# Patient Record
Sex: FEMALE | Race: WHITE | NOT HISPANIC OR LATINO | Employment: FULL TIME | URBAN - METROPOLITAN AREA
[De-identification: names, ages, dates, MRNs, and addresses within clinical notes are randomized per-mention and may not be internally consistent; named-entity substitution may affect disease eponyms.]

---

## 2017-06-20 ENCOUNTER — TELEPHONE (OUTPATIENT)
Dept: FAMILY MEDICINE | Facility: CLINIC | Age: 37
End: 2017-06-20

## 2017-06-20 DIAGNOSIS — F41.9 ANXIETY: Chronic | ICD-10-CM

## 2017-06-20 RX ORDER — FLUOXETINE HYDROCHLORIDE 40 MG/1
40 CAPSULE ORAL DAILY
Qty: 90 CAPSULE | Refills: 3 | Status: SHIPPED | OUTPATIENT
Start: 2017-06-20 | End: 2017-06-20 | Stop reason: SDUPTHER

## 2017-06-20 RX ORDER — FLUOXETINE HYDROCHLORIDE 40 MG/1
40 CAPSULE ORAL DAILY
Qty: 90 CAPSULE | Refills: 3 | Status: SHIPPED | OUTPATIENT
Start: 2017-06-20 | End: 2017-08-16 | Stop reason: SDUPTHER

## 2017-07-19 ENCOUNTER — TELEPHONE (OUTPATIENT)
Dept: GASTROENTEROLOGY | Facility: CLINIC | Age: 37
End: 2017-07-19

## 2017-07-19 ENCOUNTER — OFFICE VISIT (OUTPATIENT)
Dept: GASTROENTEROLOGY | Facility: CLINIC | Age: 37
End: 2017-07-19
Payer: COMMERCIAL

## 2017-07-19 VITALS
DIASTOLIC BLOOD PRESSURE: 92 MMHG | WEIGHT: 223 LBS | SYSTOLIC BLOOD PRESSURE: 139 MMHG | HEIGHT: 70 IN | HEART RATE: 75 BPM | BODY MASS INDEX: 31.92 KG/M2 | RESPIRATION RATE: 18 BRPM

## 2017-07-19 DIAGNOSIS — R12 HEARTBURN: ICD-10-CM

## 2017-07-19 DIAGNOSIS — R10.11 ABDOMINAL PAIN, RUQ (RIGHT UPPER QUADRANT): Primary | ICD-10-CM

## 2017-07-19 DIAGNOSIS — R10.13 EPIGASTRIC PAIN: Primary | ICD-10-CM

## 2017-07-19 DIAGNOSIS — R10.13 ABDOMINAL PAIN, EPIGASTRIC: ICD-10-CM

## 2017-07-19 DIAGNOSIS — R10.13 DYSPEPSIA: ICD-10-CM

## 2017-07-19 PROCEDURE — 99214 OFFICE O/P EST MOD 30 MIN: CPT | Mod: S$GLB,,, | Performed by: NURSE PRACTITIONER

## 2017-07-19 RX ORDER — PANTOPRAZOLE SODIUM 40 MG/1
40 TABLET, DELAYED RELEASE ORAL DAILY
Qty: 30 TABLET | Refills: 3 | Status: SHIPPED | OUTPATIENT
Start: 2017-07-19 | End: 2017-08-08 | Stop reason: SDUPTHER

## 2017-07-19 RX ORDER — MULTIVITAMIN
1 TABLET ORAL DAILY
COMMUNITY
End: 2018-04-03

## 2017-07-19 NOTE — PROGRESS NOTES
Subjective:       Patient ID: Alyssa Robledo is a 36 y.o. female.    Chief Complaint: Abdominal Pain (RUQ X 4 years)    HPI  Reports RUQ pain that has been present for four years.  US 4 years ago was unremarkable ok.  Her pain comes and goes and radiates around to the back.  Described as both a burning and a cramping pain.  Worse after eating.  Fried foods and alcohol are typically worse.  No nausea.    She reports recently worsening indigestion with more frequent episodes of heartburn.  She has been on pepcid for these complaints for some time.  Denies dysphagia.    Reports regular bowel habit.  Denies blood with bowel movements or black stools.  She has never had HIDA scan or EGD.    Review of Systems   Constitutional: Negative.  Negative for activity change, appetite change, fatigue, fever and unexpected weight change.   HENT: Negative.  Negative for sore throat and trouble swallowing.    Respiratory: Negative.  Negative for cough, choking and shortness of breath.    Cardiovascular: Negative.  Negative for chest pain.   Gastrointestinal: Positive for abdominal pain. Negative for blood in stool, constipation, diarrhea, nausea and vomiting.   Genitourinary: Negative.  Negative for difficulty urinating, dysuria and hematuria.   Musculoskeletal: Positive for back pain. Negative for neck pain and neck stiffness.   Skin: Negative.    Neurological: Negative.  Negative for dizziness, syncope, weakness and light-headedness.   Psychiatric/Behavioral: Negative.        Objective:      Physical Exam   Constitutional: She is oriented to person, place, and time. She appears well-developed and well-nourished. No distress.   HENT:   Head: Normocephalic.   Eyes: No scleral icterus.   Neck: Neck supple.   Cardiovascular: Normal rate.    Pulmonary/Chest: Effort normal. No respiratory distress.   Abdominal: Soft. Bowel sounds are normal. She exhibits no distension and no mass. There is tenderness in the right upper quadrant and  epigastric area. There is no guarding.   Musculoskeletal: Normal range of motion.   Neurological: She is oriented to person, place, and time.   Skin: Skin is warm and dry. She is not diaphoretic.   Psychiatric: She has a normal mood and affect. Her behavior is normal. Judgment and thought content normal.   Vitals reviewed.      Assessment:       1. Abdominal pain, RUQ (right upper quadrant)    2. Abdominal pain, epigastric    3. Dyspepsia    4. Heartburn        Plan:         Alyssa was seen today for abdominal pain.    Diagnoses and all orders for this visit:    Abdominal pain, RUQ (right upper quadrant)  -     CBC auto differential; Future  -     Comprehensive metabolic panel; Future  -     Lipase; Future  -     US Abdomen Complete; Future    Abdominal pain, epigastric    Dyspepsia    Heartburn    Other orders  -     pantoprazole (PROTONIX) 40 MG tablet; Take 1 tablet (40 mg total) by mouth once daily.    I have explained the planned procedures to the patient.The risks, benefits and alternatives of the procedure were also explained in detail. Patient verbalized understanding, all questions were answered. The patient agrees to proceed as planned.    Change pepcid to pantoprazole.     Labs, US and EGD with Dr. Watson per patient request.    May need to consider HIDA scan.

## 2017-07-19 NOTE — TELEPHONE ENCOUNTER
This is a patient that I saw in [place].  Her  is a patient of Dr. Watson's and she would like to also schedule with Dr. Watson in [place] for EGD.    Please call her to schedule EGD for epigastric pain, dyspepsia

## 2017-07-19 NOTE — TELEPHONE ENCOUNTER
Patient is scheduled for EGD on July 28, 2017 with Dr. Watson in [place], prep information was explained and mailed out to the patient.

## 2017-07-19 NOTE — TELEPHONE ENCOUNTER
Spoke with pt and was able to give her lab results. Pt stated that she needed help with her [employer] account. I was able to call the help desk and have that rep help this pt.       ----- Message from KURT Barnett sent at 7/19/2017  1:28 PM CDT -----  Let her know that labs are normal

## 2017-07-20 ENCOUNTER — PATIENT MESSAGE (OUTPATIENT)
Dept: GASTROENTEROLOGY | Facility: CLINIC | Age: 37
End: 2017-07-20

## 2017-07-20 ENCOUNTER — TELEPHONE (OUTPATIENT)
Dept: GASTROENTEROLOGY | Facility: CLINIC | Age: 37
End: 2017-07-20

## 2017-07-20 NOTE — TELEPHONE ENCOUNTER
"----- Message from Jermaine Ray sent at 7/19/2017  3:30 PM CDT -----  Contact: self  [phone]  "Please call me in reference to lab results"  "

## 2017-07-28 ENCOUNTER — ANESTHESIA (OUTPATIENT)
Dept: ENDOSCOPY | Facility: HOSPITAL | Age: 37
End: 2017-07-28
Payer: COMMERCIAL

## 2017-07-28 ENCOUNTER — HOSPITAL ENCOUNTER (OUTPATIENT)
Facility: HOSPITAL | Age: 37
Discharge: HOME | End: 2017-07-28
Attending: INTERNAL MEDICINE | Admitting: INTERNAL MEDICINE
Payer: COMMERCIAL

## 2017-07-28 ENCOUNTER — ANESTHESIA EVENT (OUTPATIENT)
Dept: ENDOSCOPY | Facility: HOSPITAL | Age: 37
End: 2017-07-28
Payer: COMMERCIAL

## 2017-07-28 VITALS
HEART RATE: 70 BPM | DIASTOLIC BLOOD PRESSURE: 72 MMHG | HEIGHT: 69 IN | TEMPERATURE: 98 F | RESPIRATION RATE: 18 BRPM | BODY MASS INDEX: 32.58 KG/M2 | WEIGHT: 220 LBS | OXYGEN SATURATION: 99 % | SYSTOLIC BLOOD PRESSURE: 128 MMHG

## 2017-07-28 DIAGNOSIS — Z12.11 SCREENING FOR COLORECTAL CANCER: ICD-10-CM

## 2017-07-28 DIAGNOSIS — Z12.12 SCREENING FOR COLORECTAL CANCER: ICD-10-CM

## 2017-07-28 DIAGNOSIS — R10.11 RUQ PAIN: Primary | ICD-10-CM

## 2017-07-28 DIAGNOSIS — R12 HEARTBURN: ICD-10-CM

## 2017-07-28 LAB
B-HCG UR QL: NEGATIVE
CTP QC/QA: YES

## 2017-07-28 PROCEDURE — 37000008 HC ANESTHESIA 1ST 15 MINUTES: Performed by: INTERNAL MEDICINE

## 2017-07-28 PROCEDURE — 63600175 PHARM REV CODE 636 W HCPCS: Performed by: NURSE ANESTHETIST, CERTIFIED REGISTERED

## 2017-07-28 PROCEDURE — 81025 URINE PREGNANCY TEST: CPT | Performed by: INTERNAL MEDICINE

## 2017-07-28 PROCEDURE — 88305 TISSUE EXAM BY PATHOLOGIST: CPT | Mod: 26,,, | Performed by: PATHOLOGY

## 2017-07-28 PROCEDURE — 88305 TISSUE EXAM BY PATHOLOGIST: CPT | Performed by: PATHOLOGY

## 2017-07-28 PROCEDURE — 25000003 PHARM REV CODE 250: Performed by: INTERNAL MEDICINE

## 2017-07-28 PROCEDURE — 25000003 PHARM REV CODE 250: Performed by: ANESTHESIOLOGY

## 2017-07-28 PROCEDURE — 37000009 HC ANESTHESIA EA ADD 15 MINS: Performed by: INTERNAL MEDICINE

## 2017-07-28 PROCEDURE — 27201012 HC FORCEPS, HOT/COLD, DISP: Performed by: INTERNAL MEDICINE

## 2017-07-28 PROCEDURE — 43239 EGD BIOPSY SINGLE/MULTIPLE: CPT | Mod: ,,, | Performed by: INTERNAL MEDICINE

## 2017-07-28 PROCEDURE — 43239 EGD BIOPSY SINGLE/MULTIPLE: CPT | Performed by: INTERNAL MEDICINE

## 2017-07-28 RX ORDER — MIDAZOLAM HYDROCHLORIDE 1 MG/ML
INJECTION, SOLUTION INTRAMUSCULAR; INTRAVENOUS
Status: DISCONTINUED | OUTPATIENT
Start: 2017-07-28 | End: 2017-07-28

## 2017-07-28 RX ORDER — LIDOCAINE HCL/PF 100 MG/5ML
SYRINGE (ML) INTRAVENOUS
Status: DISCONTINUED | OUTPATIENT
Start: 2017-07-28 | End: 2017-07-28

## 2017-07-28 RX ORDER — PROPOFOL 10 MG/ML
VIAL (ML) INTRAVENOUS
Status: DISCONTINUED | OUTPATIENT
Start: 2017-07-28 | End: 2017-07-28

## 2017-07-28 RX ORDER — NAPROXEN SODIUM 220 MG
220 TABLET ORAL
COMMUNITY
End: 2017-08-16

## 2017-07-28 RX ORDER — SCOLOPAMINE TRANSDERMAL SYSTEM 1 MG/1
1 PATCH, EXTENDED RELEASE TRANSDERMAL ONCE
Status: COMPLETED | OUTPATIENT
Start: 2017-07-28 | End: 2017-07-28

## 2017-07-28 RX ORDER — SODIUM CHLORIDE 9 MG/ML
INJECTION, SOLUTION INTRAVENOUS CONTINUOUS
Status: DISCONTINUED | OUTPATIENT
Start: 2017-07-28 | End: 2017-07-28 | Stop reason: HOSPADM

## 2017-07-28 RX ORDER — FENTANYL CITRATE 50 UG/ML
INJECTION, SOLUTION INTRAMUSCULAR; INTRAVENOUS
Status: DISCONTINUED | OUTPATIENT
Start: 2017-07-28 | End: 2017-07-28

## 2017-07-28 RX ADMIN — SODIUM CHLORIDE: 0.9 INJECTION, SOLUTION INTRAVENOUS at 11:07

## 2017-07-28 RX ADMIN — SCOPALAMINE 1.5 MG: 1 PATCH, EXTENDED RELEASE TRANSDERMAL at 11:07

## 2017-07-28 RX ADMIN — MIDAZOLAM 2 MG: 1 INJECTION INTRAMUSCULAR; INTRAVENOUS at 12:07

## 2017-07-28 RX ADMIN — FENTANYL CITRATE 100 MCG: 50 INJECTION, SOLUTION INTRAMUSCULAR; INTRAVENOUS at 12:07

## 2017-07-28 RX ADMIN — PROPOFOL 50 MG: 10 INJECTION, EMULSION INTRAVENOUS at 12:07

## 2017-07-28 RX ADMIN — PROPOFOL 20 MG: 10 INJECTION, EMULSION INTRAVENOUS at 12:07

## 2017-07-28 RX ADMIN — LIDOCAINE HYDROCHLORIDE 75 MG: 20 INJECTION, SOLUTION INTRAVENOUS at 12:07

## 2017-07-28 NOTE — DISCHARGE INSTRUCTIONS
Post EGD Discharge Instruction    Alyssa Robledo  7/28/2017  Jimbo Watson Jr., *    RESTRICTIONS ON ACTIVITY:    -DO NOT drive a car, operate machinery or make critical decisions, or do activities that require coordination or balance for 24 hours.  -Following Day: Return to full activities including work.  -Diet: Eat and drink normally unless instructed otherwise.    TREATMENT FOR COMMON SIDE EFFECTS:  *Sore Throat - treat with throat lozenges, gargle with warm salt water.  *Mild abdominal pain & bloating- rest and take liquids only.    SYMPTOMS TO WATCH FOR AND REPORT TO YOUR PHYSICIAN:  1. Chills or fever occurring 24 hours after procedure.  2. Pain in chest.  3. SEVERE abdominal pain or bloating.  4. Rectal bleeding which could be maroon or black.    If you have any questions or problems, please call your Physician:    Jimbo Watson Jr., * Phone:     Lab Results: [phone]    If a complication or emergency situation arises and you are unable to reach your Physician - GO TO THE EMERGENCY ROOM.

## 2017-07-28 NOTE — ANESTHESIA POSTPROCEDURE EVALUATION
"Anesthesia Post Evaluation    Patient: Alyssa Robledo    Procedure(s) Performed: Procedure(s) (LRB):  ESOPHAGOGASTRODUODENOSCOPY (EGD) (N/A)    Final Anesthesia Type: MAC  Patient location during evaluation: GI PACU  Patient participation: Yes- Able to Participate  Level of consciousness: awake and alert  Post-procedure vital signs: reviewed and stable  Pain management: adequate  Airway patency: patent  PONV status at discharge: No PONV  Anesthetic complications: no      Cardiovascular status: blood pressure returned to baseline and hemodynamically stable  Respiratory status: unassisted, spontaneous ventilation and room air  Hydration status: euvolemic  Follow-up not needed.        Visit Vitals  BP (!) 144/85   Pulse 77   Temp 37.1 °C (98.7 °F) (Oral)   Resp (!) 8   Ht 5' 9" (1.753 m)   Wt 99.8 kg (220 lb)   LMP 07/20/2017   SpO2 97%   Breastfeeding? No   BMI 32.49 kg/m²       Pain/Jacoby Score: Pain Assessment Performed: Yes (7/28/2017 11:00 AM)  Presence of Pain: denies (7/28/2017 11:00 AM)      "

## 2017-07-28 NOTE — TRANSFER OF CARE
"Anesthesia Transfer of Care Note    Patient: Alyssa Robledo    Procedure(s) Performed: Procedure(s) (LRB):  ESOPHAGOGASTRODUODENOSCOPY (EGD) (N/A)    Patient location: GI    Anesthesia Type: MAC    Transport from OR: Transported from OR on room air with adequate spontaneous ventilation    Post pain: adequate analgesia    Post assessment: no apparent anesthetic complications    Post vital signs: stable    Level of consciousness: awake    Nausea/Vomiting: no nausea/vomiting    Complications: none    Transfer of care protocol was followed      Last vitals:   Visit Vitals  BP (!) 144/85   Pulse 77   Temp 37.1 °C (98.7 °F) (Oral)   Resp (!) 8   Ht 5' 9" (1.753 m)   Wt 99.8 kg (220 lb)   LMP 07/20/2017   SpO2 97%   Breastfeeding? No   BMI 32.49 kg/m²     "

## 2017-07-28 NOTE — ANESTHESIA PREPROCEDURE EVALUATION
07/28/2017  Alyssa Robledo is a 36 y.o., female for EGD under MAC    Anesthesia Evaluation     I have reviewed the Nursing Notes.   I have reviewed the Medications.     Review of Systems  Anesthesia Hx:  Personal Hx of Anesthesia complications, Post-Operative Nausea/Vomiting (request scop patch), in the past, but not with recent anesthetics / prophylaxis   Social:  Social Alcohol Use, Non-Smoker   Cardiovascular:  Cardiovascular Normal Exercise tolerance: good     Hepatic/GI:   GERD        Physical Exam  General:  Obesity       Chest/Lungs:  Chest/Lungs Clear    Heart/Vascular:  Heart Findings: Normal            Anesthesia Plan  Type of Anesthesia, risks & benefits discussed:  Anesthesia Type:  MAC  Patient's Preference:   Intra-op Monitoring Plan:   Intra-op Monitoring Plan Comments:   Post Op Pain Control Plan:   Post Op Pain Control Plan Comments:   Induction:    Beta Blocker:  Patient is not currently on a Beta-Blocker (No further documentation required).       Informed Consent: Patient understands risks and agrees with Anesthesia plan.  Questions answered. Anesthesia consent signed with patient.  ASA Score: 2     Day of Surgery Review of History & Physical:        Anesthesia Plan Notes: Scop patch         Ready For Surgery From Anesthesia Perspective.

## 2017-08-02 ENCOUNTER — TELEPHONE (OUTPATIENT)
Dept: GASTROENTEROLOGY | Facility: CLINIC | Age: 37
End: 2017-08-02

## 2017-08-02 DIAGNOSIS — R10.11 ABDOMINAL PAIN, RUQ (RIGHT UPPER QUADRANT): Primary | ICD-10-CM

## 2017-08-02 NOTE — TELEPHONE ENCOUNTER
Informed pt of US results. Pt stated that a HIDA scan needed to be done. Message sent to NP so that she can place the order. Verbal Understanding.      ----- Message from KURT Barnett sent at 8/2/2017  4:20 PM CDT -----  US is ok

## 2017-08-03 NOTE — TELEPHONE ENCOUNTER
Spoke with pt and was able to give her the number to scheduling so she can schedule her HIDA Scan.

## 2017-08-04 ENCOUNTER — TELEPHONE (OUTPATIENT)
Dept: ENDOSCOPY | Facility: HOSPITAL | Age: 37
End: 2017-08-04

## 2017-08-08 ENCOUNTER — PATIENT MESSAGE (OUTPATIENT)
Dept: GASTROENTEROLOGY | Facility: CLINIC | Age: 37
End: 2017-08-08

## 2017-08-08 RX ORDER — PANTOPRAZOLE SODIUM 40 MG/1
40 TABLET, DELAYED RELEASE ORAL DAILY
Qty: 90 TABLET | Refills: 3 | Status: SHIPPED | OUTPATIENT
Start: 2017-08-08 | End: 2018-03-07 | Stop reason: SDUPTHER

## 2017-08-10 ENCOUNTER — TELEPHONE (OUTPATIENT)
Dept: GASTROENTEROLOGY | Facility: CLINIC | Age: 37
End: 2017-08-10

## 2017-08-10 NOTE — TELEPHONE ENCOUNTER
----- Message from Jimbo Watson Jr., MD sent at 8/9/2017 11:51 AM CDT -----  Gastric biopsies negative for H. Pylori  Test indicates reflux  Rec continue PPI.  Follow up as needed

## 2017-08-11 ENCOUNTER — TELEPHONE (OUTPATIENT)
Dept: FAMILY MEDICINE | Facility: CLINIC | Age: 37
End: 2017-08-11

## 2017-08-15 ENCOUNTER — TELEPHONE (OUTPATIENT)
Dept: GASTROENTEROLOGY | Facility: CLINIC | Age: 37
End: 2017-08-15

## 2017-08-16 ENCOUNTER — OFFICE VISIT (OUTPATIENT)
Dept: FAMILY MEDICINE | Facility: CLINIC | Age: 37
End: 2017-08-16
Payer: COMMERCIAL

## 2017-08-16 ENCOUNTER — TELEPHONE (OUTPATIENT)
Dept: GASTROENTEROLOGY | Facility: CLINIC | Age: 37
End: 2017-08-16

## 2017-08-16 VITALS
DIASTOLIC BLOOD PRESSURE: 88 MMHG | SYSTOLIC BLOOD PRESSURE: 148 MMHG | RESPIRATION RATE: 16 BRPM | BODY MASS INDEX: 33.37 KG/M2 | OXYGEN SATURATION: 98 % | HEART RATE: 94 BPM | HEIGHT: 69 IN | WEIGHT: 225.31 LBS

## 2017-08-16 DIAGNOSIS — Z13.220 SCREENING FOR LIPID DISORDERS: ICD-10-CM

## 2017-08-16 DIAGNOSIS — F41.9 ANXIETY: Chronic | ICD-10-CM

## 2017-08-16 DIAGNOSIS — R03.0 ELEVATED BP WITHOUT DIAGNOSIS OF HYPERTENSION: ICD-10-CM

## 2017-08-16 DIAGNOSIS — E66.9 OBESITY (BMI 30.0-34.9): ICD-10-CM

## 2017-08-16 DIAGNOSIS — K21.9 GASTROESOPHAGEAL REFLUX DISEASE, ESOPHAGITIS PRESENCE NOT SPECIFIED: Primary | ICD-10-CM

## 2017-08-16 PROBLEM — E66.811 OBESITY (BMI 30.0-34.9): Status: ACTIVE | Noted: 2017-08-16

## 2017-08-16 PROCEDURE — 99999 PR PBB SHADOW E&M-EST. PATIENT-LVL III: CPT | Mod: PBBFAC,,, | Performed by: FAMILY MEDICINE

## 2017-08-16 PROCEDURE — 3008F BODY MASS INDEX DOCD: CPT | Mod: S$GLB,,, | Performed by: FAMILY MEDICINE

## 2017-08-16 PROCEDURE — 99214 OFFICE O/P EST MOD 30 MIN: CPT | Mod: S$GLB,,, | Performed by: FAMILY MEDICINE

## 2017-08-16 RX ORDER — FLUOXETINE HYDROCHLORIDE 40 MG/1
40 CAPSULE ORAL DAILY
Qty: 90 CAPSULE | Refills: 3 | Status: SHIPPED | OUTPATIENT
Start: 2017-08-16 | End: 2018-04-03 | Stop reason: SDUPTHER

## 2017-08-16 RX ORDER — DICYCLOMINE HYDROCHLORIDE 20 MG/1
20 TABLET ORAL
Qty: 120 TABLET | Refills: 5 | Status: SHIPPED | OUTPATIENT
Start: 2017-08-16 | End: 2017-09-15

## 2017-08-16 NOTE — PROGRESS NOTES
Subjective:       Patient ID: Alyssa Robledo is a 37 y.o. female.    Chief Complaint: Establish Care and Medication Refill    HPI 37 year old female here for refill on anxiety medication . Patient was diagnosed in 2013 with anxiety after delivery of second child. Patient takes prozac daily and finds that symptoms are well controlled.   Patient has RUQ pain intermittently for 3 years. She is being evaluated by GI. She is taking protonix daily with relief of heart burn symptoms but not with RUQ pain.   patient's gynecologist is  at [facility] and pap smears are normal. Patient takes Ocella for OCP.     Review of Systems   Constitutional: Negative for chills and fever.   Respiratory: Negative for chest tightness and shortness of breath.    Cardiovascular: Negative for chest pain and leg swelling.   Gastrointestinal: Positive for abdominal pain and nausea. Negative for blood in stool, diarrhea and vomiting.   Genitourinary: Negative for dysuria and menstrual problem.   Neurological: Negative for weakness and headaches.   Psychiatric/Behavioral: Negative for agitation, behavioral problems, self-injury and suicidal ideas. The patient is nervous/anxious.        Objective:      Vitals:    08/16/17 1117   BP: (!) 148/88   Pulse: 94   Resp: 16     Physical Exam   Constitutional: She is oriented to person, place, and time. She appears well-developed and well-nourished. No distress.   HENT:   Mouth/Throat: Oropharynx is clear and moist. No oropharyngeal exudate.   Eyes: EOM are normal. Right eye exhibits no discharge. Left eye exhibits no discharge.   Neck: Normal range of motion.   Cardiovascular: Normal rate and regular rhythm.    Pulmonary/Chest: Effort normal. She has no wheezes.   Abdominal: Soft. There is no tenderness. There is no guarding.   Lymphadenopathy:     She has no cervical adenopathy.   Neurological: She is alert and oriented to person, place, and time.   Skin: She is not diaphoretic.   Psychiatric: She  has a normal mood and affect. Her behavior is normal. Judgment and thought content normal.   Vitals reviewed.      Assessment:       1. Gastroesophageal reflux disease, esophagitis presence not specified    2. Anxiety    3. Obesity (BMI 30.0-34.9)    4. Screening for lipid disorders    5. Elevated BP without diagnosis of hypertension        Plan:         1. Elevated BP: patient advised to record daily bp and bring cuff to next appointment. She was also advised on 150 min of exercise weekly, and low salt diet.   2. Anxiety: stable. Refilled prozac  3. Dyspepsia, RUQ pain: followed by GI. On PPI.   4. Screening: pap per gyn at [facility]. Will get screening labs.    rtc 4 weeks for bp f/u.   Gastroesophageal reflux disease, esophagitis presence not specified    Anxiety  -     fluoxetine (PROZAC) 40 MG capsule; Take 1 capsule (40 mg total) by mouth once daily.  Dispense: 90 capsule; Refill: 3    Obesity (BMI 30.0-34.9)    Screening for lipid disorders  -     Lipid panel; Future; Expected date: 08/16/2017    Elevated BP without diagnosis of hypertension  -     TSH; Future; Expected date: 08/16/2017      Return in about 4 weeks (around 9/13/2017).

## 2017-08-16 NOTE — TELEPHONE ENCOUNTER
Spoke with pt and informed her that an Rx has been sent to her pharmacy. Pt would like her rx sent to [facility] and not [facility]. Medication has been transferred to [facility] from [facility]. Pt stated that she would like to schedule her f/u visit on her [facility].  Verbal Understanding.

## 2017-08-16 NOTE — TELEPHONE ENCOUNTER
Informed pt of HIDA scan results, also Bx results. Pt stated that she is still having this pain and would like to know what else she should do.         ---- Message from Roxi Farias sent at 8/16/2017 11:47 AM CDT -----  No. [phone]  Patient returned your call.

## 2017-08-16 NOTE — PATIENT INSTRUCTIONS

## 2017-08-16 NOTE — TELEPHONE ENCOUNTER
SHERIN to call the office back       --- Message from KURT Barnett sent at 8/16/2017 11:37 AM CDT -----  Let her know that HIDA is normal

## 2017-11-20 PROBLEM — Z13.220 SCREENING FOR LIPID DISORDERS: Status: RESOLVED | Noted: 2017-07-28 | Resolved: 2017-11-20

## 2018-03-07 ENCOUNTER — PATIENT MESSAGE (OUTPATIENT)
Dept: GASTROENTEROLOGY | Facility: CLINIC | Age: 38
End: 2018-03-07

## 2018-03-07 RX ORDER — PANTOPRAZOLE SODIUM 40 MG/1
40 TABLET, DELAYED RELEASE ORAL DAILY
Qty: 90 TABLET | Refills: 3 | Status: SHIPPED | OUTPATIENT
Start: 2018-03-07 | End: 2018-04-06

## 2018-04-03 ENCOUNTER — OFFICE VISIT (OUTPATIENT)
Dept: INTERNAL MEDICINE | Facility: CLINIC | Age: 38
End: 2018-04-03
Payer: COMMERCIAL

## 2018-04-03 VITALS
SYSTOLIC BLOOD PRESSURE: 122 MMHG | DIASTOLIC BLOOD PRESSURE: 80 MMHG | HEART RATE: 78 BPM | OXYGEN SATURATION: 98 % | RESPIRATION RATE: 16 BRPM | BODY MASS INDEX: 30.01 KG/M2 | HEIGHT: 69 IN | WEIGHT: 202.63 LBS

## 2018-04-03 DIAGNOSIS — Z13.6 SCREENING FOR CARDIOVASCULAR CONDITION: ICD-10-CM

## 2018-04-03 DIAGNOSIS — F41.9 ANXIETY: Chronic | ICD-10-CM

## 2018-04-03 DIAGNOSIS — J30.9 ALLERGIC RHINITIS, UNSPECIFIED CHRONICITY, UNSPECIFIED SEASONALITY, UNSPECIFIED TRIGGER: ICD-10-CM

## 2018-04-03 DIAGNOSIS — Z76.89 ESTABLISHING CARE WITH NEW DOCTOR, ENCOUNTER FOR: Primary | ICD-10-CM

## 2018-04-03 PROCEDURE — 99999 PR PBB SHADOW E&M-EST. PATIENT-LVL III: CPT | Mod: PBBFAC,,, | Performed by: INTERNAL MEDICINE

## 2018-04-03 PROCEDURE — 99214 OFFICE O/P EST MOD 30 MIN: CPT | Mod: S$GLB,,, | Performed by: INTERNAL MEDICINE

## 2018-04-03 RX ORDER — AZELASTINE 1 MG/ML
1 SPRAY, METERED NASAL 2 TIMES DAILY
Qty: 30 ML | Refills: 1 | Status: SHIPPED | OUTPATIENT
Start: 2018-04-03 | End: 2020-07-29 | Stop reason: SDUPTHER

## 2018-04-03 RX ORDER — FLUOXETINE HYDROCHLORIDE 40 MG/1
40 CAPSULE ORAL DAILY
Qty: 90 CAPSULE | Refills: 3 | Status: SHIPPED | OUTPATIENT
Start: 2018-04-03 | End: 2019-04-02 | Stop reason: SDUPTHER

## 2018-04-03 RX ORDER — CETIRIZINE HYDROCHLORIDE 10 MG/1
10 TABLET ORAL DAILY
COMMUNITY
End: 2024-01-03

## 2018-04-03 RX ORDER — AMOXICILLIN 500 MG
2 CAPSULE ORAL DAILY
COMMUNITY
End: 2022-01-20

## 2018-04-03 NOTE — PROGRESS NOTES
"Subjective:      Patient ID: Alyssa Robledo is a 37 y.o. female.    Chief Complaint: Establish Care; Medication Refill; and Nasal Congestion (green mucus x 1 1/2 week)    HPI: 37 y.o. White female , had been a pt of Gavino Yeboah, both whom have left.  Comes in to establish care.  I have reviewed her PMH,SH,FH.  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  She has had seasonal allergies periodically, and although she has been on Zyrtec and Flonase,  She has had green snot, sinus congestion for 11/2 weeks.  No fever,chills,cough,SOB,wheezing.      Review of Systems   Constitutional: Negative for activity change, appetite change, chills, diaphoresis, fatigue and fever.   HENT: Positive for congestion, postnasal drip, rhinorrhea, sinus pain and sinus pressure. Negative for ear pain, facial swelling, hearing loss, sore throat and trouble swallowing.    Eyes: Negative.    Respiratory: Positive for cough. Negative for choking, chest tightness, shortness of breath, wheezing and stridor.    Cardiovascular: Negative for chest pain, palpitations and leg swelling.   Gastrointestinal: Negative.    Genitourinary: Negative.    Musculoskeletal: Negative.    Skin: Negative.    Neurological: Negative for dizziness, seizures, weakness, light-headedness, numbness and headaches.   Hematological: Negative.    Psychiatric/Behavioral: Positive for dysphoric mood. Negative for sleep disturbance. The patient is nervous/anxious.        Objective:   /80 (BP Location: Right arm, Patient Position: Sitting, BP Method: Large (Manual))   Pulse 78   Resp 16   Ht 5' 9" (1.753 m)   Wt 91.9 kg (202 lb 9.6 oz)   SpO2 98%   BMI 29.92 kg/m²     Physical Exam   Constitutional: She is oriented to person, place, and time. She appears well-developed and well-nourished.   HENT:   Head: Normocephalic and atraumatic.   Right Ear: External ear and ear canal normal. Tympanic membrane is retracted.   Left Ear: External ear and ear canal " normal. Tympanic membrane is retracted.   Nose: Mucosal edema present. Right sinus exhibits no maxillary sinus tenderness and no frontal sinus tenderness. Left sinus exhibits no maxillary sinus tenderness and no frontal sinus tenderness.   Mouth/Throat: Uvula is midline. Mucous membranes are pale and dry. No uvula swelling. No posterior oropharyngeal edema or posterior oropharyngeal erythema. Tonsils are 0 on the right. Tonsils are 0 on the left. No tonsillar exudate.   Cardiovascular: Normal rate, regular rhythm and normal heart sounds.    Pulmonary/Chest: Effort normal and breath sounds normal.   Abdominal: Soft. Bowel sounds are normal.   Musculoskeletal: Normal range of motion.   Neurological: She is alert and oriented to person, place, and time.   Skin: Skin is warm and dry.   Psychiatric: She has a normal mood and affect.   Nursing note and vitals reviewed.      Assessment:     1. Establishing care with new doctor, encounter for    2. Anxiety    3. Allergic rhinitis, unspecified chronicity, unspecified seasonality, unspecified trigger    4. Screening for cardiovascular condition      Plan:     Establishing care with new doctor, encounter for  -     CBC auto differential; Future; Expected date: 04/03/2018  -     Comprehensive metabolic panel; Future; Expected date: 04/03/2018  -     Urinalysis; Future; Expected date: 04/03/2018    Anxiety  -     FLUoxetine (PROZAC) 40 MG capsule; Take 1 capsule (40 mg total) by mouth once daily.  Dispense: 90 capsule; Refill: 3    Allergic rhinitis, unspecified chronicity, unspecified seasonality, unspecified trigger  -     azelastine (ASTELIN) 137 mcg (0.1 %) nasal spray; 1 spray (137 mcg total) by Nasal route 2 (two) times daily.  Dispense: 30 mL; Refill: 1    Screening for cardiovascular condition  -     Lipid panel; Future; Expected date: 04/03/2018    Hold Zyrtec and Flonase for 2 weeks, then may resume Zyrtec and flonase.  Have given instructions on how,when to use both  nasal preparations and Zyrtec.  Plenty of fluids.

## 2018-06-17 ENCOUNTER — PATIENT MESSAGE (OUTPATIENT)
Dept: INTERNAL MEDICINE | Facility: CLINIC | Age: 38
End: 2018-06-17

## 2018-06-21 ENCOUNTER — PATIENT MESSAGE (OUTPATIENT)
Dept: INTERNAL MEDICINE | Facility: CLINIC | Age: 38
End: 2018-06-21

## 2018-06-27 ENCOUNTER — PATIENT MESSAGE (OUTPATIENT)
Dept: FAMILY MEDICINE | Facility: CLINIC | Age: 38
End: 2018-06-27

## 2018-06-27 ENCOUNTER — PATIENT MESSAGE (OUTPATIENT)
Dept: INTERNAL MEDICINE | Facility: CLINIC | Age: 38
End: 2018-06-27

## 2018-07-03 ENCOUNTER — PATIENT MESSAGE (OUTPATIENT)
Dept: FAMILY MEDICINE | Facility: CLINIC | Age: 38
End: 2018-07-03

## 2018-07-03 ENCOUNTER — TELEPHONE (OUTPATIENT)
Dept: FAMILY MEDICINE | Facility: CLINIC | Age: 38
End: 2018-07-03

## 2018-07-03 DIAGNOSIS — N30.00 ACUTE CYSTITIS WITHOUT HEMATURIA: ICD-10-CM

## 2018-07-03 DIAGNOSIS — N30.00 ACUTE CYSTITIS WITHOUT HEMATURIA: Primary | ICD-10-CM

## 2018-07-03 RX ORDER — NITROFURANTOIN (MACROCRYSTALS) 100 MG/1
100 CAPSULE ORAL EVERY 12 HOURS
Qty: 10 CAPSULE | Refills: 0 | Status: SHIPPED | OUTPATIENT
Start: 2018-07-03 | End: 2018-07-03 | Stop reason: SDUPTHER

## 2018-07-03 RX ORDER — NITROFURANTOIN (MACROCRYSTALS) 100 MG/1
100 CAPSULE ORAL EVERY 12 HOURS
Qty: 10 CAPSULE | Refills: 0 | Status: SHIPPED | OUTPATIENT
Start: 2018-07-03 | End: 2018-07-08

## 2018-07-03 NOTE — TELEPHONE ENCOUNTER
----- Message from Jovana Aguilera sent at 7/3/2018  4:00 PM CDT -----  Contact: [phone]/self  Patient requesting to speak with you regarding medication nitrofurantoin (MACRODANTIN) 100 MG capsule. She needs it sent to [facility]. Please call and advise.

## 2018-08-06 ENCOUNTER — OFFICE VISIT (OUTPATIENT)
Dept: URGENT CARE | Facility: CLINIC | Age: 38
End: 2018-08-06
Payer: COMMERCIAL

## 2018-08-06 VITALS
HEART RATE: 77 BPM | OXYGEN SATURATION: 98 % | WEIGHT: 201 LBS | SYSTOLIC BLOOD PRESSURE: 131 MMHG | RESPIRATION RATE: 16 BRPM | BODY MASS INDEX: 29.77 KG/M2 | DIASTOLIC BLOOD PRESSURE: 85 MMHG | TEMPERATURE: 97 F | HEIGHT: 69 IN

## 2018-08-06 DIAGNOSIS — J04.0 LARYNGITIS: ICD-10-CM

## 2018-08-06 DIAGNOSIS — J32.9 SINUSITIS, UNSPECIFIED CHRONICITY, UNSPECIFIED LOCATION: Primary | ICD-10-CM

## 2018-08-06 PROCEDURE — 96372 THER/PROPH/DIAG INJ SC/IM: CPT | Mod: S$GLB,,, | Performed by: NURSE PRACTITIONER

## 2018-08-06 PROCEDURE — 3008F BODY MASS INDEX DOCD: CPT | Mod: CPTII,S$GLB,, | Performed by: NURSE PRACTITIONER

## 2018-08-06 PROCEDURE — 99214 OFFICE O/P EST MOD 30 MIN: CPT | Mod: 25,S$GLB,, | Performed by: NURSE PRACTITIONER

## 2018-08-06 RX ORDER — BETAMETHASONE SODIUM PHOSPHATE AND BETAMETHASONE ACETATE 3; 3 MG/ML; MG/ML
6 INJECTION, SUSPENSION INTRA-ARTICULAR; INTRALESIONAL; INTRAMUSCULAR; SOFT TISSUE
Status: COMPLETED | OUTPATIENT
Start: 2018-08-06 | End: 2018-08-06

## 2018-08-06 RX ORDER — PANTOPRAZOLE SODIUM 40 MG/1
TABLET, DELAYED RELEASE ORAL
Refills: 3 | COMMUNITY
Start: 2018-06-20 | End: 2019-04-02 | Stop reason: SDUPTHER

## 2018-08-06 RX ORDER — AMOXICILLIN AND CLAVULANATE POTASSIUM 875; 125 MG/1; MG/1
1 TABLET, FILM COATED ORAL 2 TIMES DAILY
Qty: 20 TABLET | Refills: 0 | Status: SHIPPED | OUTPATIENT
Start: 2018-08-06 | End: 2018-08-16

## 2018-08-06 RX ADMIN — BETAMETHASONE SODIUM PHOSPHATE AND BETAMETHASONE ACETATE 6 MG: 3; 3 INJECTION, SUSPENSION INTRA-ARTICULAR; INTRALESIONAL; INTRAMUSCULAR; SOFT TISSUE at 04:08

## 2018-08-06 NOTE — PATIENT INSTRUCTIONS
"Please follow up with your Primary care provider within 2-5 days if your signs and symptoms have not resolved or worsen.  The usual course of cold symptoms are 10-14 days.     If your condition worsens or fails to improve we recommend that you receive another evaluation at the emergency room immediately or contact your primary medical clinic to discuss your concerns.     You must understand that you have received an Urgent Care treatment only and that you may be released before all of your medical problems are known or treated.   You, the patient, will arrange for follow up care as instructed.     Tylenol or Ibuprofen can also be used as directed for pain/fever unless you have an allergy to them or medical condition such as stomach ulcers, kidney or liver disease or blood thinners etc for which you should not be taking these type of medications.     Take over the counter cough medication as directed as needed for cough.  You should avoid medications with pseudoephedrine or phenylephrine (any medication with "D") if you have high blood pressure as this can cause an elevation in your blood pressure. Instead consider Corcidin HBP as needed to prevent an elevated blood pressure.     Natural remedies of symptoms (as needed) include humidification, saline nasal sprays, and/or steamy showers.  Increase fluids, warm tea with honey, cough drops as needed.  You may also use salt water gargles for sore throat.    IF you received a steroid shot today - As discussed, this can elevate your blood pressure, elevate your blood sugar, water weight gain, nervous energy, redness to the face and dimpling of the skin at the injection site.     You have been given an antibiotic to treat your condition today.  Please complete the antibiotic as directed on the bottle.     As with any antibiotics, use probiotics and/or high culture yogurt about 2 hours apart from the antibiotic and about 1 week after the antibiotic to replace the gut demetria " lost with antibiotic use.      If you are female and on BCP use additional methods to prevent pregnancy while on antibiotics and for one cycle after.         Laryngitis    Laryngitis is a swelling of the tissues around the vocal cords. Symptoms include a hoarse (scratchy) voice. The voice may be lost completely. It may be caused by a viral illness, such as a head or chest cold. It may also be due to overuse and strain of the voice. Smoking, drinking alcohol, acid reflux, allergies, or inhaling harsh chemicals may also lead to symptoms. This condition will usually resolve in 1-2 weeks.  Home care  · Rest your voice until it recovers. Talk as little as possible. If your symptoms are severe, rest at home for a day or so.  · Breathing cool steam from a humidifier/vaporizer or in a steamy shower may be helpful.  · Drink plenty of fluids to stay well hydrated.  · Do not smoke  Follow-up care  Follow up with your healthcare provider or this facility if you have not improved after one week.  When to seek medical advice  Contact your healthcare provider for any of the following:  · Severe pain with swallowing  · Trouble opening mouth  · Neck swelling, neck pain, or trouble moving neck  · Noisy breathing or trouble breathing  · Fever of 100.4°F (38.ºC) or higher, or as directed by your healthcare provider  · Drooling  · Symptoms do not resolve in 2 weeks  Date Last Reviewed: 4/26/2015  © 9609-3696 [employer]. [address] [place] [address]. All rights reserved. This information is not intended as a substitute for professional medical care. Always follow your healthcare professional's instructions.        Sinusitis (Antibiotic Treatment)    The sinuses are air-filled spaces within the bones of the face. They connect to the inside of the nose. Sinusitis is an inflammation of the tissue lining the sinus cavity. Sinus inflammation can occur during a cold. It can also be due to allergies to pollens and  other particles in the air. Sinusitis can cause symptoms of sinus congestion and fullness. A sinus infection causes fever, headache and facial pain. There is often green or yellow drainage from the nose or into the back of the throat (post-nasal drip). You have been given antibiotics to treat this condition.  Home care:  · Take the full course of antibiotics as instructed. Do not stop taking them, even if you feel better.  · Drink plenty of water, hot tea, and other liquids. This may help thin mucus. It also may promote sinus drainage.  · Heat may help soothe painful areas of the face. Use a towel soaked in hot water. Or,  the shower and direct the hot spray onto your face. Using a vaporizer along with a menthol rub at night may also help.   · An expectorant containing guaifenesin may help thin the mucus and promote drainage from the sinuses.  · Over-the-counter decongestants may be used unless a similar medicine was prescribed. Nasal sprays work the fastest. Use one that contains phenylephrine or oxymetazoline. First blow the nose gently. Then use the spray. Do not use these medicines more often than directed on the label or symptoms may get worse. You may also use tablets containing pseudoephedrine. Avoid products that combine ingredients, because side effects may be increased. Read labels. You can also ask the pharmacist for help. (NOTE: Persons with high blood pressure should not use decongestants. They can raise blood pressure.)  · Over-the-counter antihistamines may help if allergies contributed to your sinusitis.    · Do not use nasal rinses or irrigation during an acute sinus infection, unless told to by your health care provider. Rinsing may spread the infection to other sinuses.  · Use acetaminophen or ibuprofen to control pain, unless another pain medicine was prescribed. (If you have chronic liver or kidney disease or ever had a stomach ulcer, talk with your doctor before using these medicines.  Aspirin should never be used in anyone under 18 years of age who is ill with a fever. It may cause severe liver damage.)  · Don't smoke. This can worsen symptoms.  Follow-up care  Follow up with your healthcare provider or our staff if you are not improving within the next week.  When to seek medical advice  Call your healthcare provider if any of these occur:  · Facial pain or headache becoming more severe  · Stiff neck  · Unusual drowsiness or confusion  · Swelling of the forehead or eyelids  · Vision problems, including blurred or double vision  · Fever of 100.4ºF (38ºC) or higher, or as directed by your healthcare provider  · Seizure  · Breathing problems  · Symptoms not resolving within 10 days  Date Last Reviewed: 4/13/2015  © 6467-5326 [employer]. [address] [place] [address], [place] [address]. All rights reserved. This information is not intended as a substitute for professional medical care. Always follow your healthcare professional's instructions.

## 2018-08-06 NOTE — PROGRESS NOTES
"Subjective:       Patient ID: Alyssa Robledo is a 37 y.o. female.    Vitals:  height is 5' 9" (1.753 m) and weight is 91.2 kg (201 lb). Her oral temperature is 97.4 °F (36.3 °C). Her blood pressure is 131/85 and her pulse is 77. Her respiration is 16 and oxygen saturation is 98%.     Chief Complaint: Hoarse    Patient states she had some allergy symptoms prior earlier in the week, but was in the night air 2 nights ago and lost her voice, she has tried over the counter flonase, zyrtec, and cough drops with no relief.      Cough   This is a new problem. The current episode started in the past 7 days (2 days ago). The problem has been gradually worsening. The problem occurs constantly. The cough is non-productive. Pertinent negatives include no chest pain, chills, fever, headaches, rash, sore throat or shortness of breath. The symptoms are aggravated by cold air. She has tried nothing for the symptoms. The treatment provided no relief. There is no history of asthma.     Review of Systems   Constitution: Negative for chills and fever.   HENT: Negative for sore throat.    Eyes: Negative for blurred vision.   Cardiovascular: Negative for chest pain.   Respiratory: Positive for cough. Negative for shortness of breath.    Skin: Negative for rash.   Musculoskeletal: Negative for back pain and joint pain.   Gastrointestinal: Negative for abdominal pain, diarrhea, nausea and vomiting.   Neurological: Negative for headaches.   Psychiatric/Behavioral: The patient is not nervous/anxious.        Objective:      Physical Exam   Constitutional: She is oriented to person, place, and time. Vital signs are normal. She appears well-developed and well-nourished. She is active and cooperative. No distress.   HENT:   Head: Normocephalic and atraumatic.   Nose: Mucosal edema and rhinorrhea present. Right sinus exhibits maxillary sinus tenderness. Right sinus exhibits no frontal sinus tenderness. Left sinus exhibits maxillary sinus " tenderness. Left sinus exhibits no frontal sinus tenderness.   Mouth/Throat: Oropharynx is clear and moist and mucous membranes are normal.   Eyes: Conjunctivae and lids are normal.   Neck: Trachea normal, normal range of motion, full passive range of motion without pain and phonation normal. Neck supple.   Cardiovascular: Normal rate, regular rhythm, normal heart sounds, intact distal pulses and normal pulses.    Pulmonary/Chest: Effort normal and breath sounds normal.   Abdominal: Soft. Normal appearance and bowel sounds are normal. She exhibits no abdominal bruit, no pulsatile midline mass and no mass.   Musculoskeletal: She exhibits no edema or deformity.   Neurological: She is alert and oriented to person, place, and time. She has normal strength and normal reflexes. No sensory deficit.   Skin: Skin is warm, dry and intact. Capillary refill takes less than 2 seconds. No rash noted. She is not diaphoretic. No cyanosis. Nails show no clubbing.   Psychiatric: She has a normal mood and affect. Her speech is normal and behavior is normal. Judgment and thought content normal. Cognition and memory are normal.   Nursing note and vitals reviewed.      Assessment:       1. Sinusitis, unspecified chronicity, unspecified location    2. Laryngitis        Plan:         Sinusitis, unspecified chronicity, unspecified location  -     amoxicillin-clavulanate 875-125mg (AUGMENTIN) 875-125 mg per tablet; Take 1 tablet by mouth 2 (two) times daily. for 10 days  Dispense: 20 tablet; Refill: 0  -     betamethasone acetate-betamethasone sodium phosphate injection 6 mg; Inject 1 mL (6 mg total) into the muscle one time.    Laryngitis      Patient Instructions   Please follow up with your Primary care provider within 2-5 days if your signs and symptoms have not resolved or worsen.  The usual course of cold symptoms are 10-14 days.     If your condition worsens or fails to improve we recommend that you receive another evaluation at the  "emergency room immediately or contact your primary medical clinic to discuss your concerns.     You must understand that you have received an Urgent Care treatment only and that you may be released before all of your medical problems are known or treated.   You, the patient, will arrange for follow up care as instructed.     Tylenol or Ibuprofen can also be used as directed for pain/fever unless you have an allergy to them or medical condition such as stomach ulcers, kidney or liver disease or blood thinners etc for which you should not be taking these type of medications.     Take over the counter cough medication as directed as needed for cough.  You should avoid medications with pseudoephedrine or phenylephrine (any medication with "D") if you have high blood pressure as this can cause an elevation in your blood pressure. Instead consider Corcidin HBP as needed to prevent an elevated blood pressure.     Natural remedies of symptoms (as needed) include humidification, saline nasal sprays, and/or steamy showers.  Increase fluids, warm tea with honey, cough drops as needed.  You may also use salt water gargles for sore throat.    IF you received a steroid shot today - As discussed, this can elevate your blood pressure, elevate your blood sugar, water weight gain, nervous energy, redness to the face and dimpling of the skin at the injection site.     You have been given an antibiotic to treat your condition today.  Please complete the antibiotic as directed on the bottle.     As with any antibiotics, use probiotics and/or high culture yogurt about 2 hours apart from the antibiotic and about 1 week after the antibiotic to replace the gut demetria lost with antibiotic use.      If you are female and on BCP use additional methods to prevent pregnancy while on antibiotics and for one cycle after.         Laryngitis    Laryngitis is a swelling of the tissues around the vocal cords. Symptoms include a hoarse (scratchy) voice. " The voice may be lost completely. It may be caused by a viral illness, such as a head or chest cold. It may also be due to overuse and strain of the voice. Smoking, drinking alcohol, acid reflux, allergies, or inhaling harsh chemicals may also lead to symptoms. This condition will usually resolve in 1-2 weeks.  Home care  · Rest your voice until it recovers. Talk as little as possible. If your symptoms are severe, rest at home for a day or so.  · Breathing cool steam from a humidifier/vaporizer or in a steamy shower may be helpful.  · Drink plenty of fluids to stay well hydrated.  · Do not smoke  Follow-up care  Follow up with your healthcare provider or this facility if you have not improved after one week.  When to seek medical advice  Contact your healthcare provider for any of the following:  · Severe pain with swallowing  · Trouble opening mouth  · Neck swelling, neck pain, or trouble moving neck  · Noisy breathing or trouble breathing  · Fever of 100.4°F (38.ºC) or higher, or as directed by your healthcare provider  · Drooling  · Symptoms do not resolve in 2 weeks  Date Last Reviewed: 4/26/2015  © 1220-5437 [employer]. [address] [place] [address]. All rights reserved. This information is not intended as a substitute for professional medical care. Always follow your healthcare professional's instructions.        Sinusitis (Antibiotic Treatment)    The sinuses are air-filled spaces within the bones of the face. They connect to the inside of the nose. Sinusitis is an inflammation of the tissue lining the sinus cavity. Sinus inflammation can occur during a cold. It can also be due to allergies to pollens and other particles in the air. Sinusitis can cause symptoms of sinus congestion and fullness. A sinus infection causes fever, headache and facial pain. There is often green or yellow drainage from the nose or into the back of the throat (post-nasal drip). You have been given  antibiotics to treat this condition.  Home care:  · Take the full course of antibiotics as instructed. Do not stop taking them, even if you feel better.  · Drink plenty of water, hot tea, and other liquids. This may help thin mucus. It also may promote sinus drainage.  · Heat may help soothe painful areas of the face. Use a towel soaked in hot water. Or,  the shower and direct the hot spray onto your face. Using a vaporizer along with a menthol rub at night may also help.   · An expectorant containing guaifenesin may help thin the mucus and promote drainage from the sinuses.  · Over-the-counter decongestants may be used unless a similar medicine was prescribed. Nasal sprays work the fastest. Use one that contains phenylephrine or oxymetazoline. First blow the nose gently. Then use the spray. Do not use these medicines more often than directed on the label or symptoms may get worse. You may also use tablets containing pseudoephedrine. Avoid products that combine ingredients, because side effects may be increased. Read labels. You can also ask the pharmacist for help. (NOTE: Persons with high blood pressure should not use decongestants. They can raise blood pressure.)  · Over-the-counter antihistamines may help if allergies contributed to your sinusitis.    · Do not use nasal rinses or irrigation during an acute sinus infection, unless told to by your health care provider. Rinsing may spread the infection to other sinuses.  · Use acetaminophen or ibuprofen to control pain, unless another pain medicine was prescribed. (If you have chronic liver or kidney disease or ever had a stomach ulcer, talk with your doctor before using these medicines. Aspirin should never be used in anyone under 18 years of age who is ill with a fever. It may cause severe liver damage.)  · Don't smoke. This can worsen symptoms.  Follow-up care  Follow up with your healthcare provider or our staff if you are not improving within the next  week.  When to seek medical advice  Call your healthcare provider if any of these occur:  · Facial pain or headache becoming more severe  · Stiff neck  · Unusual drowsiness or confusion  · Swelling of the forehead or eyelids  · Vision problems, including blurred or double vision  · Fever of 100.4ºF (38ºC) or higher, or as directed by your healthcare provider  · Seizure  · Breathing problems  · Symptoms not resolving within 10 days  Date Last Reviewed: 4/13/2015  © 7506-8089 [employer]. [address] [place] [address], [place] [address]. All rights reserved. This information is not intended as a substitute for professional medical care. Always follow your healthcare professional's instructions.

## 2018-08-09 ENCOUNTER — TELEPHONE (OUTPATIENT)
Dept: URGENT CARE | Facility: CLINIC | Age: 38
End: 2018-08-09

## 2018-09-17 ENCOUNTER — PATIENT MESSAGE (OUTPATIENT)
Dept: INTERNAL MEDICINE | Facility: CLINIC | Age: 38
End: 2018-09-17

## 2018-09-17 RX ORDER — FLUCONAZOLE 150 MG/1
150 TABLET ORAL DAILY
Qty: 1 TABLET | Refills: 0 | Status: SHIPPED | OUTPATIENT
Start: 2018-09-17 | End: 2018-09-18

## 2018-12-12 ENCOUNTER — TELEPHONE (OUTPATIENT)
Dept: FAMILY MEDICINE | Facility: CLINIC | Age: 38
End: 2018-12-12

## 2018-12-12 NOTE — TELEPHONE ENCOUNTER
Spoke with patient in regards to appointment for tomorrow. Was okay with cancellation and she stated that she hasn't had any problems. Advised if they reoccur to call the office and we can reschedule her appointment

## 2018-12-12 NOTE — TELEPHONE ENCOUNTER
----- Message from Saritha Hilton sent at 12/12/2018 12:08 PM CST -----  Contact: self [phone]  Patient is returning the nurse's call about rescheduling her appointment. Please call and advise.

## 2019-04-01 NOTE — PROGRESS NOTES
FAMILY MEDICINE    Patient Active Problem List   Diagnosis    Generalized anxiety disorder    Gastroesophageal reflux disease    Obesity (BMI 30.0-34.9)    Essential hypertension    Streptococcal pharyngitis       CC:   Chief Complaint   Patient presents with    Sore Throat     urgent care follow up       SUBJECTIVE:  Alyssa Robledo   is a 38 y.o. female  - with obesity, anxiety and GERD presents with concerns with streptococcus throat. PCP Dr. Bennett    1. Sore throat with + stept on 3/31/19 and was seen at [facility].  Was started on Amoxacillin 500mg 1 tab TID. Reports that feeling better but just wanted to be sure    2. Anxiety  Age diagnosed: 33 yo s/p post-depression and anxiety     Prior treatments: denies  Side effects of prior treatment: NA    Current treatment: Prozac 40 mg daily  Side effects of current treatment: denies    Symptoms related: well controlled. Has attempt to wean off of medication and worsening irritability and anxiety.   Panic attacks: denies    Hopelessness: denies  Sleep: denies  Suicidal thoughts: denies  Thoughts of self harm:denies  Thoughts of harm to others: denies  History of suicide attempts: denies  Family history of suicide:denies    Support system: family and friends  Counselor: none at this time    3. GERD    Age diagnosed: 30 yo  Current treatment: Protonix 40 mg daily    Symptoms: reflux  - has attempt to stop medication but symptoms worsen  Are symptoms controlled? Yes while on medication  Prior EGD: yes  7/28/2017   Upper GI endoscopy   Impression:           - Normal duodenal bulb and second portion of the                         duodenum.                        - Normal stomach. Biopsied.                        - Z-line regular, 43 cm from the incisors.                        - Normal lower third of esophagus. Biopsied.                        - Normal esophagus.   Pathology negative Hpylori. Benign        ROS: Review of Systems    Constitutional: Negative for appetite change, fatigue and fever (resolved).   HENT: Positive for sore throat and voice change. Negative for congestion, ear discharge, ear pain, facial swelling, nosebleeds, postnasal drip, rhinorrhea, sinus pressure, sinus pain and trouble swallowing.    Eyes: Negative for pain, discharge, redness and itching.   Respiratory: Negative for cough, chest tightness and shortness of breath.    Cardiovascular: Negative for chest pain, palpitations and leg swelling.   Gastrointestinal: Negative for abdominal pain, blood in stool, constipation, diarrhea, nausea and vomiting.   Endocrine: Negative for polydipsia, polyphagia and polyuria.   Genitourinary: Negative for difficulty urinating, frequency, hematuria and urgency.   Musculoskeletal: Negative for myalgias (resolved).   Skin: Negative for rash.   Neurological: Negative for dizziness, weakness, light-headedness and headaches.   Psychiatric/Behavioral: Negative for dysphoric mood and sleep disturbance. The patient is not nervous/anxious.        Past Medical History:   Diagnosis Date    Anxiety     GERD (gastroesophageal reflux disease)        Past Surgical History:   Procedure Laterality Date     SECTION      2    DILATION AND CURETTAGE OF UTERUS      ESOPHAGOGASTRODUODENOSCOPY (EGD) N/A 2017    Performed by Jimbo Watson Jr., MD at [facility] END[facility]       ALLERGIES:   Review of patient's allergies indicates:   Allergen Reactions    Phenergan [promethazine] Hives       MEDS:   Current Outpatient Medications:     amoxicillin (AMOXIL) 500 MG capsule, , Disp: , Rfl:     azelastine (ASTELIN) 137 mcg (0.1 %) nasal spray, 1 spray (137 mcg total) by Nasal route 2 (two) times daily., Disp: 30 mL, Rfl: 1    butalbital-acetaminophen-caffeine -40 mg (FIORICET, ESGIC) -40 mg per tablet, Take 1 tablet by mouth every 4 (four) hours as needed for Pain., Disp: , Rfl:     cetirizine (ZYRTEC) 10 MG tablet, Take 10 mg by  "mouth once daily., Disp: , Rfl:     chlorthalidone (HYGROTEN) 25 MG Tab, , Disp: , Rfl:     fish oil-omega-3 fatty acids 300-1,000 mg capsule, Take 2 capsules by mouth once daily., Disp: , Rfl:     FLUoxetine 40 MG capsule, Take 1 capsule (40 mg total) by mouth once daily., Disp: 90 capsule, Rfl: 1    Lactobacillus acidophilus (PROBIOTIC) 10 billion cell Cap, Take 1 tablet by mouth once daily., Disp: , Rfl:     OCELLA 3-0.03 mg per tablet, , Disp: , Rfl:     pantoprazole (PROTONIX) 40 MG tablet, Take 1 tablet (40 mg total) by mouth once daily., Disp: 90 tablet, Rfl: 1    methylPREDNISolone (MEDROL DOSEPACK) 4 mg tablet, use as directed, Disp: 1 Package, Rfl: 0    OBJECTIVE:   Vitals:    04/02/19 0943 04/02/19 1008   BP: (!) 130/92 130/80   BP Location: Left arm    Patient Position: Sitting    BP Method: Large (Manual)    Pulse: 78    Temp: 98.2 °F (36.8 °C)    TempSrc: Oral    SpO2: 99%    Weight: 93.9 kg (207 lb)    Height: 5' 9" (1.753 m)      Body mass index is 30.57 kg/m².    Physical Exam   Constitutional: No distress.   HENT:   Right Ear: Tympanic membrane and ear canal normal.   Left Ear: Tympanic membrane and ear canal normal.   Nose: Nose normal.   Mouth/Throat: Uvula is midline and mucous membranes are normal. Posterior oropharyngeal erythema present. Tonsils are 1+ on the right. Tonsils are 1+ on the left. Tonsillar exudate (left tonsil).       Eyes: Conjunctivae are normal. No scleral icterus.   Neck: Neck supple.   Cardiovascular: Normal rate, regular rhythm, normal heart sounds and intact distal pulses. Exam reveals no gallop and no friction rub.   No murmur heard.  Pulmonary/Chest: Effort normal and breath sounds normal.   Musculoskeletal: She exhibits no edema.   Lymphadenopathy:     She has cervical adenopathy.   Neurological: She is alert.       Depression Patient Health Questionnaire 4/2/2019 4/3/2018   In the last two weeks how often have you had little interest or pleasure in doing things " 0 0   In the last two weeks how often have you felt down, depressed or hopeless 0 0   PHQ-2 Total Score 0 0       ASSESSMENT:  Problem List Items Addressed This Visit        Psychiatric    Generalized anxiety disorder - Primary    Current Assessment & Plan     - well controlled  - tolerating medication  - continue Prozac 40 mg daily         Relevant Medications    FLUoxetine 40 MG capsule       ENT    Streptococcal pharyngitis    Current Assessment & Plan     - second episode in last 1 year  - continue Amoxacillin until prescription completed  - add medrol dose pack for pain relief         Relevant Medications    methylPREDNISolone (MEDROL DOSEPACK) 4 mg tablet       GI    Gastroesophageal reflux disease    Overview     - 7/28/2017 EGD: Normal. Pathology negative for Hpylori. Benign         Current Assessment & Plan     - discussed current recommendations against long term use of PPI  - pt aware and has attempt to change to H2 blocker and adjust diet without relief  - EGD reviewed  - okay to continue PPI at this time         Relevant Medications    pantoprazole (PROTONIX) 40 MG tablet          PLAN:   Orders Placed This Encounter    pantoprazole (PROTONIX) 40 MG tablet    FLUoxetine 40 MG capsule    methylPREDNISolone (MEDROL DOSEPACK) 4 mg tablet     Request PAP smear from Dr. David Hoener. SOFIA signed.     Follow-up in 6 months or sooner if no improvement.     Dr. Alba Mayers D.O.   Family Medicine

## 2019-04-02 ENCOUNTER — OFFICE VISIT (OUTPATIENT)
Dept: FAMILY MEDICINE | Facility: CLINIC | Age: 39
End: 2019-04-02
Payer: COMMERCIAL

## 2019-04-02 VITALS
SYSTOLIC BLOOD PRESSURE: 130 MMHG | TEMPERATURE: 98 F | HEART RATE: 78 BPM | OXYGEN SATURATION: 99 % | WEIGHT: 207 LBS | HEIGHT: 69 IN | DIASTOLIC BLOOD PRESSURE: 80 MMHG | BODY MASS INDEX: 30.66 KG/M2

## 2019-04-02 DIAGNOSIS — F41.1 GENERALIZED ANXIETY DISORDER: ICD-10-CM

## 2019-04-02 DIAGNOSIS — F41.9 ANXIETY: Primary | Chronic | ICD-10-CM

## 2019-04-02 DIAGNOSIS — K21.9 GASTROESOPHAGEAL REFLUX DISEASE WITHOUT ESOPHAGITIS: ICD-10-CM

## 2019-04-02 DIAGNOSIS — J02.0 STREPTOCOCCAL PHARYNGITIS: ICD-10-CM

## 2019-04-02 PROBLEM — I10 ESSENTIAL HYPERTENSION: Status: ACTIVE | Noted: 2017-08-16

## 2019-04-02 PROCEDURE — 3008F PR BODY MASS INDEX (BMI) DOCUMENTED: ICD-10-PCS | Mod: CPTII,S$GLB,, | Performed by: FAMILY MEDICINE

## 2019-04-02 PROCEDURE — 99214 PR OFFICE/OUTPT VISIT, EST, LEVL IV, 30-39 MIN: ICD-10-PCS | Mod: S$GLB,,, | Performed by: FAMILY MEDICINE

## 2019-04-02 PROCEDURE — 99999 PR PBB SHADOW E&M-EST. PATIENT-LVL IV: CPT | Mod: PBBFAC,,, | Performed by: FAMILY MEDICINE

## 2019-04-02 PROCEDURE — 99214 OFFICE O/P EST MOD 30 MIN: CPT | Mod: S$GLB,,, | Performed by: FAMILY MEDICINE

## 2019-04-02 PROCEDURE — 99999 PR PBB SHADOW E&M-EST. PATIENT-LVL IV: ICD-10-PCS | Mod: PBBFAC,,, | Performed by: FAMILY MEDICINE

## 2019-04-02 PROCEDURE — 3008F BODY MASS INDEX DOCD: CPT | Mod: CPTII,S$GLB,, | Performed by: FAMILY MEDICINE

## 2019-04-02 RX ORDER — AMOXICILLIN 500 MG/1
CAPSULE ORAL
COMMUNITY
Start: 2019-03-31 | End: 2019-12-23

## 2019-04-02 RX ORDER — CHLORTHALIDONE 25 MG/1
TABLET ORAL
COMMUNITY
Start: 2019-03-23 | End: 2021-01-15 | Stop reason: SDUPTHER

## 2019-04-02 RX ORDER — OSELTAMIVIR PHOSPHATE 75 MG/1
CAPSULE ORAL
COMMUNITY
Start: 2019-02-03 | End: 2019-04-02 | Stop reason: ALTCHOICE

## 2019-04-02 RX ORDER — BENZONATATE 100 MG/1
CAPSULE ORAL
COMMUNITY
Start: 2019-02-06 | End: 2019-04-02 | Stop reason: ALTCHOICE

## 2019-04-02 RX ORDER — MELOXICAM 7.5 MG/1
TABLET ORAL
COMMUNITY
Start: 2019-02-28 | End: 2019-04-02 | Stop reason: ALTCHOICE

## 2019-04-02 RX ORDER — PANTOPRAZOLE SODIUM 40 MG/1
40 TABLET, DELAYED RELEASE ORAL DAILY
Qty: 90 TABLET | Refills: 1 | Status: SHIPPED | OUTPATIENT
Start: 2019-04-02 | End: 2019-12-23

## 2019-04-02 RX ORDER — FLUOXETINE HYDROCHLORIDE 40 MG/1
40 CAPSULE ORAL DAILY
Qty: 90 CAPSULE | Refills: 1 | Status: SHIPPED | OUTPATIENT
Start: 2019-04-02 | End: 2019-12-23 | Stop reason: SDUPTHER

## 2019-04-02 RX ORDER — DICLOFENAC SODIUM 10 MG/G
GEL TOPICAL
COMMUNITY
Start: 2019-03-01 | End: 2019-04-02 | Stop reason: ALTCHOICE

## 2019-04-02 RX ORDER — METHYLPREDNISOLONE 4 MG/1
TABLET ORAL
Qty: 1 PACKAGE | Refills: 0 | Status: SHIPPED | OUTPATIENT
Start: 2019-04-02 | End: 2019-04-23

## 2019-04-02 RX ORDER — ALBUTEROL SULFATE 90 UG/1
AEROSOL, METERED RESPIRATORY (INHALATION)
COMMUNITY
Start: 2019-02-06 | End: 2019-04-02 | Stop reason: ALTCHOICE

## 2019-04-02 NOTE — LETTER
April 2, 2019      [address] [place]  [place]   [place] [address]  Phone: [phone]  Fax: [phone]       Patient: Alyssa Robledo   YOB: 1980  Date of Visit: 04/02/2019    To Whom It May Concern:    Maverick Robledo  was at [facility] on 04/02/2019. She may return to work on 4/3/19  with no restrictions.     If you have any questions or concerns, or if I can be of further assistance, please do not hesitate to contact me.    Sincerely,    Alba Mayers, DO

## 2019-04-02 NOTE — ASSESSMENT & PLAN NOTE
- discussed current recommendations against long term use of PPI  - pt aware and has attempt to change to H2 blocker and adjust diet without relief  - EGD reviewed  - okay to continue PPI at this time

## 2019-04-02 NOTE — ASSESSMENT & PLAN NOTE
- second episode in last 1 year  - continue Amoxacillin until prescription completed  - add medrol dose pack for pain relief

## 2019-04-24 ENCOUNTER — TELEPHONE (OUTPATIENT)
Dept: ADMINISTRATIVE | Facility: HOSPITAL | Age: 39
End: 2019-04-24

## 2019-07-01 ENCOUNTER — PATIENT MESSAGE (OUTPATIENT)
Dept: FAMILY MEDICINE | Facility: CLINIC | Age: 39
End: 2019-07-01

## 2019-12-04 ENCOUNTER — TELEPHONE (OUTPATIENT)
Dept: FAMILY MEDICINE | Facility: CLINIC | Age: 39
End: 2019-12-04

## 2019-12-04 DIAGNOSIS — Z00.01 ENCOUNTER FOR GENERAL ADULT MEDICAL EXAMINATION WITH ABNORMAL FINDINGS: Primary | ICD-10-CM

## 2019-12-04 NOTE — TELEPHONE ENCOUNTER
Spoke with pt, pt stated she is due for an annual exam and also would like to maybe change a medication or increase it and would like to come in 12/23/19 due to being off of work can I put her in the 10 am slot?

## 2019-12-04 NOTE — TELEPHONE ENCOUNTER
----- Message from Vee Polo sent at 12/4/2019  4:13 PM CST -----  Contact: self, [phone]  Patient requests to be seen on 12/23 for her annual check up if possible. Please advise.

## 2019-12-05 NOTE — TELEPHONE ENCOUNTER
Yes, okay to use any spot that works best for her. If she wants labs, I went ahead and ordered her annual labs to be done 1-2 days prior. Fasting. [facility]  Dr. Alba Mayers D.O.   [facility]

## 2019-12-09 ENCOUNTER — PATIENT OUTREACH (OUTPATIENT)
Dept: ADMINISTRATIVE | Facility: HOSPITAL | Age: 39
End: 2019-12-09

## 2019-12-18 ENCOUNTER — PATIENT MESSAGE (OUTPATIENT)
Dept: FAMILY MEDICINE | Facility: CLINIC | Age: 39
End: 2019-12-18

## 2019-12-18 DIAGNOSIS — N76.0 ACUTE VAGINITIS: Primary | ICD-10-CM

## 2019-12-18 RX ORDER — FLUCONAZOLE 150 MG/1
150 TABLET ORAL DAILY
Qty: 1 TABLET | Refills: 0 | Status: SHIPPED | OUTPATIENT
Start: 2019-12-18 | End: 2019-12-19

## 2019-12-23 ENCOUNTER — OFFICE VISIT (OUTPATIENT)
Dept: FAMILY MEDICINE | Facility: CLINIC | Age: 39
End: 2019-12-23
Payer: COMMERCIAL

## 2019-12-23 VITALS
OXYGEN SATURATION: 98 % | HEIGHT: 69 IN | TEMPERATURE: 99 F | HEART RATE: 80 BPM | RESPIRATION RATE: 18 BRPM | SYSTOLIC BLOOD PRESSURE: 122 MMHG | DIASTOLIC BLOOD PRESSURE: 80 MMHG | WEIGHT: 225 LBS | BODY MASS INDEX: 33.33 KG/M2

## 2019-12-23 DIAGNOSIS — R73.01 IMPAIRED FASTING GLUCOSE: ICD-10-CM

## 2019-12-23 DIAGNOSIS — I10 ESSENTIAL HYPERTENSION: ICD-10-CM

## 2019-12-23 DIAGNOSIS — E66.9 OBESITY (BMI 30.0-34.9): ICD-10-CM

## 2019-12-23 DIAGNOSIS — K21.9 GASTROESOPHAGEAL REFLUX DISEASE WITHOUT ESOPHAGITIS: ICD-10-CM

## 2019-12-23 DIAGNOSIS — Z00.01 ENCOUNTER FOR GENERAL ADULT MEDICAL EXAMINATION WITH ABNORMAL FINDINGS: Primary | ICD-10-CM

## 2019-12-23 DIAGNOSIS — F41.1 GENERALIZED ANXIETY DISORDER: ICD-10-CM

## 2019-12-23 PROBLEM — G43.009 MIGRAINE WITHOUT AURA AND WITHOUT STATUS MIGRAINOSUS, NOT INTRACTABLE: Status: ACTIVE | Noted: 2019-12-23

## 2019-12-23 PROBLEM — J02.0 STREPTOCOCCAL PHARYNGITIS: Status: RESOLVED | Noted: 2019-04-02 | Resolved: 2019-12-23

## 2019-12-23 PROCEDURE — 99999 PR PBB SHADOW E&M-EST. PATIENT-LVL IV: CPT | Mod: PBBFAC,,, | Performed by: FAMILY MEDICINE

## 2019-12-23 PROCEDURE — 99999 PR PBB SHADOW E&M-EST. PATIENT-LVL IV: ICD-10-PCS | Mod: PBBFAC,,, | Performed by: FAMILY MEDICINE

## 2019-12-23 PROCEDURE — 99395 PREV VISIT EST AGE 18-39: CPT | Mod: S$GLB,,, | Performed by: FAMILY MEDICINE

## 2019-12-23 PROCEDURE — 99395 PR PREVENTIVE VISIT,EST,18-39: ICD-10-PCS | Mod: S$GLB,,, | Performed by: FAMILY MEDICINE

## 2019-12-23 RX ORDER — FLUOXETINE HYDROCHLORIDE 20 MG/1
20 CAPSULE ORAL DAILY
Qty: 90 CAPSULE | Refills: 1 | Status: SHIPPED | OUTPATIENT
Start: 2019-12-23 | End: 2020-04-16 | Stop reason: SDUPTHER

## 2019-12-23 RX ORDER — PANTOPRAZOLE SODIUM 40 MG/1
40 TABLET, DELAYED RELEASE ORAL DAILY
Qty: 90 TABLET | Refills: 1 | Status: CANCELLED | OUTPATIENT
Start: 2019-12-23

## 2019-12-23 NOTE — PROGRESS NOTES
FAMILY MEDICINE    Patient Active Problem List   Diagnosis    Generalized anxiety disorder    Gastroesophageal reflux disease    Obesity (BMI 30.0-34.9)    Essential hypertension    Migraine without aura and without status migrainosus, not intractable    Encounter for general adult medical examination with abnormal findings    Impaired fasting glucose       CC:   Chief Complaint   Patient presents with    Annual Exam       HPI: Alyssa Robledo is a 39 y.o. female  - with obesity, migraine headaches, GERD, anxiety and hypertension presents for routine annual wellness visit.     Today she reports that she is doing well. She did have recent symptoms of a vaginal yeast infection and was treated with Diflucan 150 mg once and reports that symptoms have resolved. She also reports that she is going to [place] for a wedding and interested in any vaccines that are recommended    Last PAP 12/22/17 in records by Dr. Yanez at [facility] and pt reports scheduled for 1/2020  SOFIA: signed    1. Anxiety  - interested in reducing dose since doing well and concerned with weight  - feels that she still needs to medication    Age diagnosed: 33 yo s/p post-partum depression and anxiety      Prior treatments: denies  Side effects of prior treatment: NA     Current treatment: Prozac 40 mg daily  Side effects of current treatment: denies     Symptoms related: well controlled.  Panic attacks: denies     Hopelessness: denies  Sleep: denies  Suicidal thoughts: denies  Thoughts of self harm:denies  Thoughts of harm to others: denies  History of suicide attempts: denies  Family history of suicide:denies     Support system: family and friends  Counselor: none at this time      HEALTH MAINTENANCE:   Health Maintenance   Topic Date Due    Pap Smear with HPV Cotest  12/22/2020    TETANUS VACCINE  03/01/2023    Lipid Panel  Completed       ROS: Review of Systems   Constitutional: Negative.    HENT: Negative.    Eyes: Negative.    Respiratory:  Negative.    Cardiovascular: Negative.    Gastrointestinal: Negative.    Endocrine: Negative.    Genitourinary: Negative.    Musculoskeletal: Negative.    Skin: Negative.    Allergic/Immunologic: Negative.    Neurological: Negative.    Hematological: Negative.    Psychiatric/Behavioral: Negative.        ALLERGIES:   Review of patient's allergies indicates:   Allergen Reactions    Phenergan [promethazine] Hives       MEDS:     Current Outpatient Medications:     butalbital-acetaminophen-caffeine -40 mg (FIORICET, ESGIC) -40 mg per tablet, Take 1 tablet by mouth every 4 (four) hours as needed for Pain., Disp: , Rfl:     cetirizine (ZYRTEC) 10 MG tablet, Take 10 mg by mouth once daily., Disp: , Rfl:     chlorthalidone (HYGROTEN) 25 MG Tab, , Disp: , Rfl:     fish oil-omega-3 fatty acids 300-1,000 mg capsule, Take 2 capsules by mouth once daily., Disp: , Rfl:     Lactobacillus acidophilus (PROBIOTIC) 10 billion cell Cap, Take 1 tablet by mouth once daily., Disp: , Rfl:     OCELLA 3-0.03 mg per tablet, , Disp: , Rfl:     azelastine (ASTELIN) 137 mcg (0.1 %) nasal spray, 1 spray (137 mcg total) by Nasal route 2 (two) times daily., Disp: 30 mL, Rfl: 1    FLUoxetine 20 MG capsule, Take 1 capsule (20 mg total) by mouth once daily., Disp: 90 capsule, Rfl: 1    hepatitis A virus vaccine, PF, (HAVRIX) 1,440 GEORGE unit/mL Susp, Inject 1 mL (1,440 Units total) into the muscle every 6 (six) months. for 2 doses, Disp: 1 mL, Rfl: 1    Past Medical History:   Diagnosis Date    Anxiety     GERD (gastroesophageal reflux disease)        Past Surgical History:   Procedure Laterality Date     SECTION      2    DILATION AND CURETTAGE OF UTERUS         Family History   Problem Relation Age of Onset    No Known Problems Mother     No Known Problems Father     Breast cancer Maternal Grandmother     Heart disease Neg Hx     Cancer Neg Hx        Social History     Tobacco Use    Smoking status: Never  "Smoker    Smokeless tobacco: Never Used   Substance Use Topics    Alcohol use: Yes     Alcohol/week: 0.8 standard drinks     Types: 1 Standard drinks or equivalent per week     Comment: 3     Drug use: No       Social History     Social History Narrative    SOCIAL HISTORY: Live in [place] with  and 2 daughters. 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 Non-smoker. No exposure to blood; No risk for STD's. Socially drinks alcohol;  is employed by [employer] as a .                      OBJECTIVE:   Vitals:    12/23/19 1018 12/23/19 1040   BP: (!) 130/90 122/80   BP Location: Left arm    Patient Position: Sitting    BP Method: X-Large (Manual)    Pulse: 80    Resp: 18    Temp: 98.5 °F (36.9 °C)    TempSrc: Oral    SpO2: 98%    Weight: 102.1 kg (225 lb)    Height: 5' 9" (1.753 m)      Body mass index is 33.23 kg/m².    Physical Exam   Constitutional: No distress.   HENT:   Head: Normocephalic and atraumatic.   Eyes: Pupils are equal, round, and reactive to light. EOM are normal.   Neck: Normal range of motion. Neck supple.   Cardiovascular: Normal rate, regular rhythm, normal heart sounds and intact distal pulses. Exam reveals no gallop and no friction rub.   No murmur heard.  Pulmonary/Chest: Effort normal and breath sounds normal. She has no wheezes. She has no rales.   Abdominal: Soft. Bowel sounds are normal. She exhibits no distension. There is no tenderness.   Musculoskeletal: She exhibits no edema.   Neurological: She is alert.   Skin: Skin is warm. Capillary refill takes less than 2 seconds.         Depression Patient Health Questionnaire 12/23/2019 4/2/2019 4/3/2018   Over the last two weeks how often have you been bothered by little interest or pleasure in doing things 0 0 0   Over the last two weeks how often have you been bothered by feeling down, depressed or hopeless 0 0 0   PHQ-2 Total Score 0 0 0       PERTINENT RESULTS:   Lab Visit on 12/20/2019   Component Date Value Ref Range " Status    Specimen UA 12/20/2019 Urine, Clean Catch   Final    Color, UA 12/20/2019 Yellow  Yellow, Straw, Kristie Final    Appearance, UA 12/20/2019 Clear  Clear Final    pH, UA 12/20/2019 7.0  5.0 - 8.0 Final    Specific Gravity, UA 12/20/2019 1.015  1.005 - 1.030 Final    Protein, UA 12/20/2019 Negative  Negative Final    Comment: Recommend a 24 hour urine protein or a urine   protein/creatinine ratio if globulin induced proteinuria is  clinically suspected.      Glucose, UA 12/20/2019 Negative  Negative Final    Ketones, UA 12/20/2019 Negative  Negative Final    Bilirubin (UA) 12/20/2019 Negative  Negative Final    Occult Blood UA 12/20/2019 2+* Negative Final    Nitrite, UA 12/20/2019 Negative  Negative Final    Urobilinogen, UA 12/20/2019 Negative  <2.0 EU/dL Final    Leukocytes, UA 12/20/2019 1+* Negative Final    RBC, UA 12/20/2019 15* 0 - 4 /hpf Final    WBC, UA 12/20/2019 4  0 - 5 /hpf Final    Bacteria 12/20/2019 Few* None-Occ /hpf Final    Squam Epithel, UA 12/20/2019 9  /hpf Final    Microscopic Comment 12/20/2019 SEE COMMENT   Final    Comment: Other formed elements not mentioned in the report are not   present in the microscopic examination.      Lab Visit on 12/20/2019   Component Date Value Ref Range Status    Sodium 12/20/2019 137  136 - 145 mmol/L Final    Potassium 12/20/2019 3.5  3.5 - 5.1 mmol/L Final    Chloride 12/20/2019 101  95 - 110 mmol/L Final    CO2 12/20/2019 25  23 - 29 mmol/L Final    Glucose 12/20/2019 111* 70 - 110 mg/dL Final    BUN, Bld 12/20/2019 18* 7 - 17 mg/dL Final    Creatinine 12/20/2019 0.58  0.50 - 1.40 mg/dL Final    Calcium 12/20/2019 9.4  8.7 - 10.5 mg/dL Final    Total Protein 12/20/2019 8.2  6.0 - 8.4 g/dL Final    Albumin 12/20/2019 4.3  3.5 - 5.2 g/dL Final    Total Bilirubin 12/20/2019 0.3  0.1 - 1.0 mg/dL Final    Comment: For infants and newborns, interpretation of results should be based  on gestational age, weight and in agreement  with clinical  observations.  Premature Infant recommended reference ranges:  Up to 24 hours.............<8.0 mg/dL  Up to 48 hours............<12.0 mg/dL  3-5 days..................<15.0 mg/dL  6-29 days.................<15.0 mg/dL      Alkaline Phosphatase 12/20/2019 118  38 - 126 U/L Final    AST 12/20/2019 25  15 - 46 U/L Final    ALT 12/20/2019 22  10 - 44 U/L Final    Anion Gap 12/20/2019 11  8 - 16 mmol/L Final    eGFR if African American 12/20/2019 >60.0  >60 mL/min/1.73 m^2 Final    eGFR if non African American 12/20/2019 >60.0  >60 mL/min/1.73 m^2 Final    Comment: Calculation used to obtain the estimated glomerular filtration  rate (eGFR) is the CKD-EPI equation.       Cholesterol 12/20/2019 178  120 - 199 mg/dL Final    Comment: The National Cholesterol Education Program (NCEP) has set the  following guidelines (reference ranges) for Cholesterol:  Optimal.....................<200 mg/dL  Borderline High.............200-239 mg/dL  High........................> or = 240 mg/dL      Triglycerides 12/20/2019 78  30 - 150 mg/dL Final    Comment: The National Cholesterol Education Program (NCEP) has set the  following guidelines (reference values) for triglycerides:  Normal......................<150 mg/dL  Borderline High.............150-199 mg/dL  High........................200-499 mg/dL      HDL 12/20/2019 72  40 - 75 mg/dL Final    Comment: The National Cholesterol Education Program (NCEP) has set the  following guidelines (reference values) for HDL Cholesterol:  Low...............<40 mg/dL  Optimal...........>60 mg/dL      LDL Cholesterol 12/20/2019 90.4  63.0 - 159.0 mg/dL Final    Comment: The National Cholesterol Education Program (NCEP) has set the  following guidelines (reference values) for LDL Cholesterol:  Optimal.......................<130 mg/dL  Borderline High...............130-159 mg/dL  High..........................160-189 mg/dL  Very High.....................>190 mg/dL       Hdl/Cholesterol Ratio 12/20/2019 40.4  20.0 - 50.0 % Final    Total Cholesterol/HDL Ratio 12/20/2019 2.5  2.0 - 5.0 Final    Non-HDL Cholesterol 12/20/2019 106  mg/dL Final    Comment: Risk category and Non-HDL cholesterol goals:  Coronary heart disease (CHD)or equivalent (10-year risk of CHD >20%):  Non-HDL cholesterol goal     <130 mg/dL  Two or more CHD risk factors and 10-year risk of CHD <= 20%:  Non-HDL cholesterol goal     <160 mg/dL  0 to 1 CHD risk factor:  Non-HDL cholesterol goal     <190 mg/dL      WBC 12/20/2019 8.92  3.90 - 12.70 K/uL Final    RBC 12/20/2019 4.55  4.00 - 5.40 M/uL Final    Hemoglobin 12/20/2019 13.0  12.0 - 16.0 g/dL Final    Hematocrit 12/20/2019 39.1  37.0 - 48.5 % Final    Mean Corpuscular Volume 12/20/2019 86  82 - 98 fL Final    Mean Corpuscular Hemoglobin 12/20/2019 28.6  27.0 - 31.0 pg Final    Mean Corpuscular Hemoglobin Conc 12/20/2019 33.2  32.0 - 36.0 g/dL Final    RDW 12/20/2019 13.6  11.5 - 14.5 % Final    Platelets 12/20/2019 334  150 - 350 K/uL Final    MPV 12/20/2019 11.1  9.2 - 12.9 fL Final    Gran # (ANC) 12/20/2019 6.2  1.8 - 7.7 K/uL Final    Lymph # 12/20/2019 1.9  1.0 - 4.8 K/uL Final    Mono # 12/20/2019 0.7  0.3 - 1.0 K/uL Final    Eos # 12/20/2019 0.1  0.0 - 0.5 K/uL Final    Baso # 12/20/2019 0.05  0.00 - 0.20 K/uL Final    Gran% 12/20/2019 69.5  38.0 - 73.0 % Final    Lymph% 12/20/2019 21.1  18.0 - 48.0 % Final    Mono% 12/20/2019 7.7  4.0 - 15.0 % Final    Eosinophil% 12/20/2019 1.3  0.0 - 8.0 % Final    Basophil% 12/20/2019 0.6  0.0 - 1.9 % Final    Differential Method 12/20/2019 Automated   Final       ASSESSMENT/PLAN:  Problem List Items Addressed This Visit        Psychiatric    Generalized anxiety disorder    Current Assessment & Plan     - well controlled  - attempt to lower dose to 20 mg daily   - encouraged to patient to notify me of any questions or concerns         Relevant Medications    FLUoxetine 20 MG capsule        Cardiac/Vascular    Essential hypertension    Current Assessment & Plan     - well controlled  - continue current medications            Endocrine    Obesity (BMI 30.0-34.9)    Current Assessment & Plan     - discussed recommendation for diet, exercise and weight loss         Impaired fasting glucose    Current Assessment & Plan     - discussed recommendation for diet, exercise and weight loss  - repeat with A1C in 6 months         Relevant Orders    Hemoglobin A1c    Glucose, fasting       GI    Gastroesophageal reflux disease    Overview     - 7/28/2017 EGD: Normal. Pathology negative for Hpylori. Benign  - taking Nexium OTC            Other    Encounter for general adult medical examination with abnormal findings - Primary    Current Assessment & Plan     - counseling on current recommendations for breast cancer screening. Recommend start 40-46yo  - counseling on current recommendations for cervical cancer screening. She is up to date and scheduled with Dr. Yanez 1/2020  - counseling on current recommendations for colon cancer screening. Pt normal risk and due at 52 yo               ORDERS:   Orders Placed This Encounter    Hemoglobin A1c    Glucose, fasting    FLUoxetine 20 MG capsule    hepatitis A virus vaccine, PF, (HAVRIX) 1,440 GEORGE unit/mL Susp     Vaccines recommended: flu vaccine and hepatitis A. Pt reports flu vaccine done in 10/2019. She would like hepatitis A for travel    Follow-up in 6 months with labs    Dr. Alba Mayers D.O.   Family Medicine

## 2019-12-26 ENCOUNTER — PATIENT OUTREACH (OUTPATIENT)
Dept: ADMINISTRATIVE | Facility: HOSPITAL | Age: 39
End: 2019-12-26

## 2020-01-01 NOTE — ASSESSMENT & PLAN NOTE
- counseling on current recommendations for breast cancer screening. Recommend start 40-44yo  - counseling on current recommendations for cervical cancer screening. She is up to date and scheduled with Dr. Yanez 1/2020  - counseling on current recommendations for colon cancer screening. Pt normal risk and due at 52 yo  
- discussed recommendation for diet, exercise and weight loss  
- discussed recommendation for diet, exercise and weight loss  - repeat with A1C in 6 months  
- well controlled  - attempt to lower dose to 20 mg daily   - encouraged to patient to notify me of any questions or concerns  
- well controlled  - continue current medications  
- - -

## 2020-03-23 PROBLEM — Z00.01 ENCOUNTER FOR GENERAL ADULT MEDICAL EXAMINATION WITH ABNORMAL FINDINGS: Status: RESOLVED | Noted: 2019-12-23 | Resolved: 2020-03-23

## 2020-04-16 ENCOUNTER — PATIENT MESSAGE (OUTPATIENT)
Dept: FAMILY MEDICINE | Facility: CLINIC | Age: 40
End: 2020-04-16

## 2020-04-16 DIAGNOSIS — F41.1 GENERALIZED ANXIETY DISORDER: ICD-10-CM

## 2020-04-16 RX ORDER — FLUOXETINE HYDROCHLORIDE 40 MG/1
40 CAPSULE ORAL DAILY
Qty: 90 CAPSULE | Refills: 1 | Status: SHIPPED | OUTPATIENT
Start: 2020-04-16 | End: 2020-07-29 | Stop reason: SDUPTHER

## 2020-06-09 ENCOUNTER — PATIENT OUTREACH (OUTPATIENT)
Dept: ADMINISTRATIVE | Facility: HOSPITAL | Age: 40
End: 2020-06-09

## 2020-06-22 ENCOUNTER — PATIENT MESSAGE (OUTPATIENT)
Dept: FAMILY MEDICINE | Facility: CLINIC | Age: 40
End: 2020-06-22

## 2020-07-29 ENCOUNTER — OFFICE VISIT (OUTPATIENT)
Dept: FAMILY MEDICINE | Facility: CLINIC | Age: 40
End: 2020-07-29
Payer: COMMERCIAL

## 2020-07-29 VITALS — BODY MASS INDEX: 30.96 KG/M2 | WEIGHT: 209 LBS | HEIGHT: 69 IN

## 2020-07-29 DIAGNOSIS — J31.0 CHRONIC RHINITIS: ICD-10-CM

## 2020-07-29 DIAGNOSIS — I10 ESSENTIAL HYPERTENSION: ICD-10-CM

## 2020-07-29 DIAGNOSIS — F41.1 GENERALIZED ANXIETY DISORDER: Primary | ICD-10-CM

## 2020-07-29 DIAGNOSIS — K21.9 GASTROESOPHAGEAL REFLUX DISEASE WITHOUT ESOPHAGITIS: ICD-10-CM

## 2020-07-29 DIAGNOSIS — E66.9 OBESITY (BMI 30.0-34.9): ICD-10-CM

## 2020-07-29 DIAGNOSIS — R73.01 IMPAIRED FASTING GLUCOSE: ICD-10-CM

## 2020-07-29 PROCEDURE — 3008F PR BODY MASS INDEX (BMI) DOCUMENTED: ICD-10-PCS | Mod: CPTII,,, | Performed by: FAMILY MEDICINE

## 2020-07-29 PROCEDURE — 3008F BODY MASS INDEX DOCD: CPT | Mod: CPTII,,, | Performed by: FAMILY MEDICINE

## 2020-07-29 PROCEDURE — 99214 OFFICE O/P EST MOD 30 MIN: CPT | Mod: 95,,, | Performed by: FAMILY MEDICINE

## 2020-07-29 PROCEDURE — 99214 PR OFFICE/OUTPT VISIT, EST, LEVL IV, 30-39 MIN: ICD-10-PCS | Mod: 95,,, | Performed by: FAMILY MEDICINE

## 2020-07-29 RX ORDER — AZELASTINE 1 MG/ML
1 SPRAY, METERED NASAL 2 TIMES DAILY
Qty: 30 ML | Refills: 11 | Status: SHIPPED | OUTPATIENT
Start: 2020-07-29 | End: 2022-06-16

## 2020-07-29 RX ORDER — FLUOXETINE HYDROCHLORIDE 40 MG/1
40 CAPSULE ORAL DAILY
Qty: 90 CAPSULE | Refills: 4 | Status: SHIPPED | OUTPATIENT
Start: 2020-07-29 | End: 2020-11-02

## 2020-07-29 NOTE — ASSESSMENT & PLAN NOTE
- she working on [facility] diet and lost 16 lbs since last visit and pleased

## 2020-07-29 NOTE — PROGRESS NOTES
VIRTUAL/TELEMEDICINE VISIT   FAMILY MEDICINE   [facility]     The patient location is: [place]  The chief complaint leading to consultation is: follow-up anxiety  Visit type: Virtual visit with synchronous audio and video  Total time spent: 25 mins  Each patient to whom he or she provides medical services by telemedicine is:  (1) informed of the relationship between the physician and patient and the respective role of any other health care provider with respect to management of the patient; and (2) notified that he or she may decline to receive medical services by telemedicine and may withdraw from such care at any time.    FAMILY MEDICINE    Patient Active Problem List   Diagnosis    Generalized anxiety disorder    Gastroesophageal reflux disease    Obesity (BMI 30.0-34.9)    Essential hypertension    Migraine without aura and without status migrainosus, not intractable    Impaired fasting glucose    Chronic rhinitis       CC:   Chief Complaint   Patient presents with    Anxiety    Follow-up       SUBJECTIVE:  Alyssa Robledo   is a 39 y.o. female  - with obesity, anxiety and GERD presents for anxiety    1. Anxiety  Age diagnosed: 33 yo s/p post-depression and anxiety     Prior treatments: denies  Side effects of prior treatment: NA    Current treatment:   FLUoxetine 40 MG capsule, Take 1 capsule (40 mg total) by mouth once daily., Disp: 90 capsule, Rfl: 1  - 4/2020 increased from 20 mg (she had previously been on 40 mg and was decreased 12/2019 since she was doing well)    Side effects of current treatment: denies    Symptoms related: reports that improved since increasing medication from 20 mg daily to 40 mg daily  - initially 4/2020 notified me that anxiety was worsening with staying home with her children due to Covid-19 and she had increased her medication. At that time she was feeling more irritable and that has all seemed to improve    Panic attacks: denies    Hopelessness: denies  Sleep:  denies  Suicidal thoughts: denies  Thoughts of self harm:denies  Thoughts of harm to others: denies  History of suicide attempts: denies  Family history of suicide:denies    Support system: family and friends  Counselor: none at this time    2. GERD    Age diagnosed: 30 yo  Current treatment: Protonix 40 mg daily    Symptoms: reflux  - has attempt to stop medication but symptoms worsen  Are symptoms controlled? Yes while on medication  Prior EGD: yes  7/28/2017   Upper GI endoscopy   Impression:           - Normal duodenal bulb and second portion of the                         duodenum.                        - Normal stomach. Biopsied.                        - Z-line regular, 43 cm from the incisors.                        - Normal lower third of esophagus. Biopsied.                        - Normal esophagus.   Pathology negative Hpylori. Benign      ROS: Review of Systems   Constitutional: Negative.  Negative for activity change and unexpected weight change.   HENT: Positive for congestion (chronci sinus issues and needs refillAstelin). Negative for hearing loss, rhinorrhea and trouble swallowing.         Otherwise negative   Eyes: Negative.  Negative for discharge and visual disturbance.   Respiratory: Negative.  Negative for chest tightness and wheezing.    Cardiovascular: Negative.  Negative for chest pain and palpitations.   Gastrointestinal: Negative.  Negative for blood in stool, constipation, diarrhea and vomiting.   Endocrine: Negative.  Negative for polydipsia and polyuria.   Genitourinary: Negative.  Negative for difficulty urinating, dysuria, hematuria and menstrual problem.   Musculoskeletal: Negative.  Negative for arthralgias, joint swelling and neck pain.   Skin: Negative.    Allergic/Immunologic: Negative.    Neurological: Negative.  Negative for weakness.   Hematological: Negative.    Psychiatric/Behavioral: Negative.  Negative for confusion and dysphoric mood.       Past Medical History:    Diagnosis Date    Anxiety     GERD (gastroesophageal reflux disease)        Past Surgical History:   Procedure Laterality Date     SECTION      2    DILATION AND CURETTAGE OF UTERUS         Family History   Problem Relation Age of Onset    No Known Problems Mother     No Known Problems Father     Breast cancer Maternal Grandmother     Heart disease Neg Hx     Cancer Neg Hx        Social History     Tobacco Use    Smoking status: Never Smoker    Smokeless tobacco: Never Used   Substance Use Topics    Alcohol use: Yes     Alcohol/week: 0.8 standard drinks     Types: 1 Standard drinks or equivalent per week     Comment: 3     Drug use: No       Social History     Social History Narrative    SOCIAL HISTORY: Live in [place] with  and 2 daughters. 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 Non-smoker. No exposure to blood; No risk for STD's. Socially drinks alcohol;  is employed by [employer] as a .                      ALLERGIES:   Review of patient's allergies indicates:   Allergen Reactions    Phenergan [promethazine] Hives       MEDS:   Current Outpatient Medications:     cetirizine (ZYRTEC) 10 MG tablet, Take 10 mg by mouth once daily., Disp: , Rfl:     chlorthalidone (HYGROTEN) 25 MG Tab, , Disp: , Rfl:     OCELLA 3-0.03 mg per tablet, , Disp: , Rfl:     azelastine (ASTELIN) 137 mcg (0.1 %) nasal spray, 1 spray (137 mcg total) by Nasal route 2 (two) times daily., Disp: 30 mL, Rfl: 11    butalbital-acetaminophen-caffeine -40 mg (FIORICET, ESGIC) -40 mg per tablet, Take 1 tablet by mouth every 4 (four) hours as needed for Pain., Disp: , Rfl:     fish oil-omega-3 fatty acids 300-1,000 mg capsule, Take 2 capsules by mouth once daily., Disp: , Rfl:     FLUoxetine 40 MG capsule, Take 1 capsule (40 mg total) by mouth once daily., Disp: 90 capsule, Rfl: 4    Lactobacillus acidophilus (PROBIOTIC) 10 billion cell Cap, Take 1 tablet by mouth once daily., Disp: ,  "Rfl:     OBJECTIVE:   Vitals:    07/29/20 1406   Weight: 94.8 kg (209 lb)   Height: 5' 9" (1.753 m)     Body mass index is 30.86 kg/m².    Physical Exam  Constitutional:       General: She is not in acute distress.     Appearance: She is not ill-appearing.   Pulmonary:      Effort: Pulmonary effort is normal.   Neurological:      Mental Status: She is alert.   Psychiatric:         Mood and Affect: Affect normal.         Speech: Speech normal.         Behavior: Behavior normal.         Thought Content: Thought content normal.         Judgment: Judgment normal.      Comments: Mood: "good"  Affect: full range           PERTINENT RESULTS:   Lab Visit on 07/24/2020   Component Date Value Ref Range Status    Hemoglobin A1C 07/24/2020 5.6  4.0 - 5.6 % Final    Comment: ADA Screening Guidelines:  5.7-6.4%  Consistent with prediabetes  >or=6.5%  Consistent with diabetes  High levels of fetal hemoglobin interfere with the HbA1C  assay. Heterozygous hemoglobin variants (HbS, HgC, etc)do  not significantly interfere with this assay.   However, presence of multiple variants may affect accuracy.      Estimated Avg Glucose 07/24/2020 114  68 - 131 mg/dL Final    Glucose, Fasting 07/24/2020 109  70 - 110 mg/dL Final       ASSESSMENT/PLAN:  Problem List Items Addressed This Visit        Psychiatric    Generalized anxiety disorder - Primary    Current Assessment & Plan     - well controlled  - continue current medication         Relevant Medications    FLUoxetine 40 MG capsule       ENT    Chronic rhinitis    Relevant Medications    azelastine (ASTELIN) 137 mcg (0.1 %) nasal spray       Cardiac/Vascular    Essential hypertension    Current Assessment & Plan     - well controlled  - continue current medication  - followed by Dr. Joy and Dr. Saavedra            Endocrine    Impaired fasting glucose    Current Assessment & Plan     - glucose 111 to 109 mg/dL  Lab Results   Component Value Date    HGBA1C 5.6 07/24/2020     - " discussed with patient  - discussed recommendation for diet, exercise and weight loss         Obesity (BMI 30.0-34.9)    Overview     - discussed recommendation for diet, exercise and weight loss         Current Assessment & Plan     - she working on [facility] diet and lost 16 lbs since last visit and pleased            GI    Gastroesophageal reflux disease    Overview     - 7/28/2017 EGD: Normal. Pathology negative for Hpylori. Benign  - taking Nexium OTC         Current Assessment & Plan     - discussed current recommendations against long term use of PPI  - pt aware and has attempt to change to H2 blocker and adjust diet without relief  - EGD reviewed  - okay to continue PPI at this time               ORDERS:   Orders Placed This Encounter    azelastine (ASTELIN) 137 mcg (0.1 %) nasal spray    FLUoxetine 40 MG capsule     Vaccines recommended: up to date    Follow-up 6-12 months or sooner if any concerns.    Dr. Alba Mayers D.O.   Family Medicine

## 2020-07-29 NOTE — ASSESSMENT & PLAN NOTE
- glucose 111 to 109 mg/dL  Lab Results   Component Value Date    HGBA1C 5.6 07/24/2020     - discussed with patient  - discussed recommendation for diet, exercise and weight loss

## 2020-09-15 ENCOUNTER — PATIENT OUTREACH (OUTPATIENT)
Dept: ADMINISTRATIVE | Facility: OTHER | Age: 40
End: 2020-09-15

## 2020-09-17 ENCOUNTER — OFFICE VISIT (OUTPATIENT)
Dept: PODIATRY | Facility: CLINIC | Age: 40
End: 2020-09-17
Payer: COMMERCIAL

## 2020-09-17 ENCOUNTER — HOSPITAL ENCOUNTER (OUTPATIENT)
Dept: RADIOLOGY | Facility: HOSPITAL | Age: 40
Discharge: HOME | End: 2020-09-17
Attending: PODIATRIST
Payer: COMMERCIAL

## 2020-09-17 VITALS
WEIGHT: 209 LBS | SYSTOLIC BLOOD PRESSURE: 135 MMHG | HEIGHT: 69 IN | DIASTOLIC BLOOD PRESSURE: 89 MMHG | HEART RATE: 86 BPM | BODY MASS INDEX: 30.96 KG/M2

## 2020-09-17 DIAGNOSIS — M77.42 METATARSALGIA, LEFT FOOT: Primary | ICD-10-CM

## 2020-09-17 DIAGNOSIS — G57.92 ENTRAPMENT NEUROPATHY OF LEFT LOWER EXTREMITY: ICD-10-CM

## 2020-09-17 DIAGNOSIS — M77.42 METATARSALGIA, LEFT FOOT: ICD-10-CM

## 2020-09-17 DIAGNOSIS — M20.5X2 ACQUIRED CURLY TOE, LEFT: ICD-10-CM

## 2020-09-17 DIAGNOSIS — M77.52 BONE SPUR OF LEFT FOOT: ICD-10-CM

## 2020-09-17 PROCEDURE — 3079F DIAST BP 80-89 MM HG: CPT | Mod: CPTII,S$GLB,, | Performed by: PODIATRIST

## 2020-09-17 PROCEDURE — 3075F PR MOST RECENT SYSTOLIC BLOOD PRESS GE 130-139MM HG: ICD-10-PCS | Mod: CPTII,S$GLB,, | Performed by: PODIATRIST

## 2020-09-17 PROCEDURE — 99203 OFFICE O/P NEW LOW 30 MIN: CPT | Mod: S$GLB,,, | Performed by: PODIATRIST

## 2020-09-17 PROCEDURE — 73630 XR FOOT COMPLETE 3 VIEW LEFT: ICD-10-PCS | Mod: 26,LT,, | Performed by: RADIOLOGY

## 2020-09-17 PROCEDURE — 99203 PR OFFICE/OUTPT VISIT, NEW, LEVL III, 30-44 MIN: ICD-10-PCS | Mod: S$GLB,,, | Performed by: PODIATRIST

## 2020-09-17 PROCEDURE — 73630 X-RAY EXAM OF FOOT: CPT | Mod: TC,PN,LT

## 2020-09-17 PROCEDURE — 3075F SYST BP GE 130 - 139MM HG: CPT | Mod: CPTII,S$GLB,, | Performed by: PODIATRIST

## 2020-09-17 PROCEDURE — 73630 X-RAY EXAM OF FOOT: CPT | Mod: 26,LT,, | Performed by: RADIOLOGY

## 2020-09-17 PROCEDURE — 3008F PR BODY MASS INDEX (BMI) DOCUMENTED: ICD-10-PCS | Mod: CPTII,S$GLB,, | Performed by: PODIATRIST

## 2020-09-17 PROCEDURE — 99999 PR PBB SHADOW E&M-EST. PATIENT-LVL IV: ICD-10-PCS | Mod: PBBFAC,,, | Performed by: PODIATRIST

## 2020-09-17 PROCEDURE — 99999 PR PBB SHADOW E&M-EST. PATIENT-LVL IV: CPT | Mod: PBBFAC,,, | Performed by: PODIATRIST

## 2020-09-17 PROCEDURE — 3008F BODY MASS INDEX DOCD: CPT | Mod: CPTII,S$GLB,, | Performed by: PODIATRIST

## 2020-09-17 PROCEDURE — 3079F PR MOST RECENT DIASTOLIC BLOOD PRESSURE 80-89 MM HG: ICD-10-PCS | Mod: CPTII,S$GLB,, | Performed by: PODIATRIST

## 2020-09-17 NOTE — PATIENT INSTRUCTIONS
Discussed box lacing pattern for shoes    Recommend metatarsal pad placing on bottom of shoe or foot

## 2020-09-17 NOTE — PROGRESS NOTES
"Subjective:      Patient ID: Alsysa Robledo is a 40 y.o. female.    Chief Complaint: Foot Pain (left foot; previous break in Oct. 2019)    Presents today complaining of persistent pain to the left foot after breaking her left 5th metatarsal at [employer] football game a year ago.  Relates that she gets pins and needles tingling to the top of the foot that radiates along the 1st and 2nd toes that is worsened when she flexes the foot down.  Also gets some moderate amount of pain underneath the ball of the left foot when she stands or walks for extended periods of time.  Pain is alleviated by rest.  She showed me an old x-ray which showed a 5th metatarsal oblique neck fracture that was significantly displaced on AP view.    Vitals:    20 0840   BP: 135/89   Pulse: 86   Weight: 94.8 kg (208 lb 15.9 oz)   Height: 5' 9" (1.753 m)   PainSc:   5   PainLoc: Foot      Past Medical History:   Diagnosis Date    Anxiety     GERD (gastroesophageal reflux disease)        Past Surgical History:   Procedure Laterality Date     SECTION      2    DILATION AND CURETTAGE OF UTERUS         Family History   Problem Relation Age of Onset    No Known Problems Mother     No Known Problems Father     Breast cancer Maternal Grandmother     Heart disease Neg Hx     Cancer Neg Hx        Social History     Socioeconomic History    Marital status:      Spouse name: Not on file    Number of children: Not on file    Years of education: Not on file    Highest education level: Not on file   Occupational History     Employer: [employer]   Social Needs    Financial resource strain: Not on file    Food insecurity     Worry: Not on file     Inability: Not on file    Transportation needs     Medical: Not on file     Non-medical: Not on file   Tobacco Use    Smoking status: Never Smoker    Smokeless tobacco: Never Used   Substance and Sexual Activity    Alcohol use: Yes     Alcohol/week: 0.8 standard drinks     " Types: 1 Standard drinks or equivalent per week     Comment: 3     Drug use: No    Sexual activity: Yes     Partners: Male   Lifestyle    Physical activity     Days per week: Not on file     Minutes per session: Not on file    Stress: Not on file   Relationships    Social connections     Talks on phone: Not on file     Gets together: Not on file     Attends [employer]ism service: Not on file     Active member of club or organization: Not on file     Attends meetings of clubs or organizations: Not on file     Relationship status: Not on file   Other Topics Concern    Not on file   Social History Narrative    SOCIAL HISTORY: Live in [place] with  and 2 daughters. 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 Non-smoker. No exposure to blood; No risk for STD's. Socially drinks alcohol;  is employed by [employer] as a .                      Current Outpatient Medications   Medication Sig Dispense Refill    azelastine (ASTELIN) 137 mcg (0.1 %) nasal spray 1 spray (137 mcg total) by Nasal route 2 (two) times daily. 30 mL 11    butalbital-acetaminophen-caffeine -40 mg (FIORICET, ESGIC) -40 mg per tablet Take 1 tablet by mouth every 4 (four) hours as needed for Pain.      cetirizine (ZYRTEC) 10 MG tablet Take 10 mg by mouth once daily.      chlorthalidone (HYGROTEN) 25 MG Tab       fish oil-omega-3 fatty acids 300-1,000 mg capsule Take 2 capsules by mouth once daily.      FLUoxetine 40 MG capsule Take 1 capsule (40 mg total) by mouth once daily. 90 capsule 4    Lactobacillus acidophilus (PROBIOTIC) 10 billion cell Cap Take 1 tablet by mouth once daily.      OCELLA 3-0.03 mg per tablet        No current facility-administered medications for this visit.        Review of patient's allergies indicates:   Allergen Reactions    Phenergan [promethazine] Hives         Review of Systems   Constitution: Negative for chills, fever and malaise/fatigue.   Cardiovascular: Negative for chest pain,  claudication and leg swelling.   Respiratory: Negative for cough and shortness of breath.    Skin: Negative for color change and poor wound healing.   Musculoskeletal: Negative for back pain, joint pain, muscle cramps and muscle weakness.   Gastrointestinal: Negative for nausea and vomiting.   Neurological: Positive for paresthesias. Negative for numbness and weakness.   Psychiatric/Behavioral: Negative for altered mental status.           Objective:      Physical Exam  Vitals signs reviewed.   Constitutional:       General: She is not in acute distress.     Appearance: Normal appearance. She is not ill-appearing.   Cardiovascular:      Pulses:           Dorsalis pedis pulses are 2+ on the right side and 2+ on the left side.        Posterior tibial pulses are 2+ on the right side and 2+ on the left side.   Musculoskeletal:      Comments: Positive provocation sign Tinel sign overlying the dorsal left midfoot in the region of the 2nd metatarsal cuneiform joint region with palpable bony exostosis.    Plantar flexed 3rd and 4th metatarsal heads left foot.  Note dorsal subluxation of the 5th metatarsal head on clinical exam with rectus appearance of the 5th toe.  Flexible flexion contracture/curly toes of the 3rd and 4th toes with lateral deviation.    Semi rigid cavus foot structure bilateral foot.   Skin:     General: Skin is warm.      Capillary Refill: Capillary refill takes less than 2 seconds.      Findings: No ecchymosis or erythema.      Nails: There is no clubbing.     Neurological:      Mental Status: She is alert and oriented to person, place, and time.      Sensory: Sensation is intact.      Motor: Motor function is intact.               Assessment:       Encounter Diagnoses   Name Primary?    Metatarsalgia, left foot Yes    Acquired curly toe, left     Bone spur of left foot     Entrapment neuropathy of left lower extremity          Plan:       Alyssa was seen today for foot pain.    Diagnoses and all  orders for this visit:    Metatarsalgia, left foot  -     X-Ray Foot Complete Left; Future    Acquired curly toe, left  -     X-Ray Foot Complete Left; Future    Bone spur of left foot  -     X-Ray Foot Complete Left; Future    Entrapment neuropathy of left lower extremity  -     X-Ray Foot Complete Left; Future      I counseled the patient on her conditions, their implications and medical management.    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  We did discuss conservative care options such as box lacing, corticosteroid injections, topical pain creams and activity modifications versus possible surgical intervention consisting of decompression of the deep peroneal nerve and extensor hallucis brevis tenotomy with exostectomy in detail.  She also has plantar flexed 3rd and 4th metatarsals and malalignment of a previous 5th metatarsal fracture which needs further assessment.  Recommended conservative treatment for this at this time consisting of metatarsal pads to help offload the area.    Weight-bearing left foot x-ray ordered to further assess underlying osseous deformity.    RTC within 2 weeks or p.r.n. as discussed.    A portion of this note was generated by voice recognition software and may contain spelling and grammar errors.      .

## 2020-09-21 ENCOUNTER — PATIENT MESSAGE (OUTPATIENT)
Dept: PODIATRY | Facility: CLINIC | Age: 40
End: 2020-09-21

## 2020-10-01 ENCOUNTER — OFFICE VISIT (OUTPATIENT)
Dept: PODIATRY | Facility: CLINIC | Age: 40
End: 2020-10-01
Payer: COMMERCIAL

## 2020-10-01 ENCOUNTER — PATIENT MESSAGE (OUTPATIENT)
Dept: PODIATRY | Facility: CLINIC | Age: 40
End: 2020-10-01

## 2020-10-01 VITALS
DIASTOLIC BLOOD PRESSURE: 85 MMHG | HEIGHT: 69 IN | HEART RATE: 84 BPM | WEIGHT: 209 LBS | SYSTOLIC BLOOD PRESSURE: 132 MMHG | BODY MASS INDEX: 30.96 KG/M2

## 2020-10-01 DIAGNOSIS — G57.92 ENTRAPMENT NEUROPATHY OF LEFT LOWER EXTREMITY: ICD-10-CM

## 2020-10-01 DIAGNOSIS — M77.52 BONE SPUR OF LEFT FOOT: ICD-10-CM

## 2020-10-01 DIAGNOSIS — M20.5X2 ACQUIRED CURLY TOE, LEFT: ICD-10-CM

## 2020-10-01 DIAGNOSIS — M77.42 METATARSALGIA, LEFT FOOT: Primary | ICD-10-CM

## 2020-10-01 PROCEDURE — 3075F SYST BP GE 130 - 139MM HG: CPT | Mod: CPTII,S$GLB,, | Performed by: PODIATRIST

## 2020-10-01 PROCEDURE — 3008F PR BODY MASS INDEX (BMI) DOCUMENTED: ICD-10-PCS | Mod: CPTII,S$GLB,, | Performed by: PODIATRIST

## 2020-10-01 PROCEDURE — 99214 PR OFFICE/OUTPT VISIT, EST, LEVL IV, 30-39 MIN: ICD-10-PCS | Mod: S$GLB,,, | Performed by: PODIATRIST

## 2020-10-01 PROCEDURE — 3079F DIAST BP 80-89 MM HG: CPT | Mod: CPTII,S$GLB,, | Performed by: PODIATRIST

## 2020-10-01 PROCEDURE — 3075F PR MOST RECENT SYSTOLIC BLOOD PRESS GE 130-139MM HG: ICD-10-PCS | Mod: CPTII,S$GLB,, | Performed by: PODIATRIST

## 2020-10-01 PROCEDURE — 3079F PR MOST RECENT DIASTOLIC BLOOD PRESSURE 80-89 MM HG: ICD-10-PCS | Mod: CPTII,S$GLB,, | Performed by: PODIATRIST

## 2020-10-01 PROCEDURE — 99999 PR PBB SHADOW E&M-EST. PATIENT-LVL IV: ICD-10-PCS | Mod: PBBFAC,,, | Performed by: PODIATRIST

## 2020-10-01 PROCEDURE — 99999 PR PBB SHADOW E&M-EST. PATIENT-LVL IV: CPT | Mod: PBBFAC,,, | Performed by: PODIATRIST

## 2020-10-01 PROCEDURE — 3008F BODY MASS INDEX DOCD: CPT | Mod: CPTII,S$GLB,, | Performed by: PODIATRIST

## 2020-10-01 PROCEDURE — 99214 OFFICE O/P EST MOD 30 MIN: CPT | Mod: S$GLB,,, | Performed by: PODIATRIST

## 2020-10-01 RX ORDER — CIPROFLOXACIN 500 MG/1
TABLET ORAL
COMMUNITY
Start: 2020-09-09 | End: 2020-11-12

## 2020-10-01 NOTE — PROGRESS NOTES
"Subjective:      Patient ID: Alyssa Robledo is a 40 y.o. female.    Chief Complaint: Foot Pain (left)    Presents today complaining of persistent pain to the left foot after breaking her left 5th metatarsal at [employer] game a year ago.  Relates that she gets pins and needles tingling to the top of the foot that radiates along the 1st and 2nd toes that is worsened when she flexes the foot down.  Also gets some moderate amount of pain underneath the ball of the left foot when she stands or walks for extended periods of time.  Pain is alleviated by rest.  She showed me an old x-ray which showed a 5th metatarsal oblique neck fracture that was significantly displaced on AP view.    10/01/2020:  Follow-up for left foot pain.  She has been wearing a silicone pad underneath the ball of foot however this seems to increase her pain.  She has been resorting to a Darco shoe and instead of tennis shoe.    Vitals:    10/01/20 0911   BP: 132/85   Pulse: 84   Weight: 94.8 kg (208 lb 15.9 oz)   Height: 5' 9" (1.753 m)   PainSc:   6      Past Medical History:   Diagnosis Date    Anxiety     GERD (gastroesophageal reflux disease)        Past Surgical History:   Procedure Laterality Date     SECTION      2    DILATION AND CURETTAGE OF UTERUS         Family History   Problem Relation Age of Onset    No Known Problems Mother     No Known Problems Father     Breast cancer Maternal Grandmother     Heart disease Neg Hx     Cancer Neg Hx        Social History     Socioeconomic History    Marital status:      Spouse name: Not on file    Number of children: Not on file    Years of education: Not on file    Highest education level: Not on file   Occupational History     Employer: [employer]   Social Needs    Financial resource strain: Not on file    Food insecurity     Worry: Not on file     Inability: Not on file    Transportation needs     Medical: Not on file     Non-medical: Not on file   Tobacco " Use    Smoking status: Never Smoker    Smokeless tobacco: Never Used   Substance and Sexual Activity    Alcohol use: Yes     Alcohol/week: 0.8 standard drinks     Types: 1 Standard drinks or equivalent per week     Comment: 3     Drug use: No    Sexual activity: Yes     Partners: Male   Lifestyle    Physical activity     Days per week: Not on file     Minutes per session: Not on file    Stress: Not on file   Relationships    Social connections     Talks on phone: Not on file     Gets together: Not on file     Attends Denominational service: Not on file     Active member of club or organization: Not on file     Attends meetings of clubs or organizations: Not on file     Relationship status: Not on file   Other Topics Concern    Not on file   Social History Narrative    SOCIAL HISTORY: Live in [place] with  and 2 daughters. 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 Non-smoker. No exposure to blood; No risk for STD's. Socially drinks alcohol;  is employed by [employer] as a .                      Current Outpatient Medications   Medication Sig Dispense Refill    azelastine (ASTELIN) 137 mcg (0.1 %) nasal spray 1 spray (137 mcg total) by Nasal route 2 (two) times daily. 30 mL 11    butalbital-acetaminophen-caffeine -40 mg (FIORICET, ESGIC) -40 mg per tablet Take 1 tablet by mouth every 4 (four) hours as needed for Pain.      cetirizine (ZYRTEC) 10 MG tablet Take 10 mg by mouth once daily.      chlorthalidone (HYGROTEN) 25 MG Tab       ciprofloxacin HCl (CIPRO) 500 MG tablet       fish oil-omega-3 fatty acids 300-1,000 mg capsule Take 2 capsules by mouth once daily.      FLUoxetine 40 MG capsule Take 1 capsule (40 mg total) by mouth once daily. 90 capsule 4    Lactobacillus acidophilus (PROBIOTIC) 10 billion cell Cap Take 1 tablet by mouth once daily.      OCELLA 3-0.03 mg per tablet        No current facility-administered medications for this visit.        Review of  patient's allergies indicates:   Allergen Reactions    Phenergan [promethazine] Hives         Review of Systems   Constitution: Negative for chills, fever and malaise/fatigue.   Cardiovascular: Negative for chest pain, claudication and leg swelling.   Respiratory: Negative for cough and shortness of breath.    Skin: Negative for color change and poor wound healing.   Musculoskeletal: Negative for back pain, joint pain, muscle cramps and muscle weakness.   Gastrointestinal: Negative for nausea and vomiting.   Neurological: Positive for paresthesias. Negative for numbness and weakness.   Psychiatric/Behavioral: Negative for altered mental status.           Objective:      Physical Exam  Vitals signs reviewed.   Constitutional:       General: She is not in acute distress.     Appearance: Normal appearance. She is not ill-appearing.   Cardiovascular:      Pulses:           Dorsalis pedis pulses are 2+ on the right side and 2+ on the left side.        Posterior tibial pulses are 2+ on the right side and 2+ on the left side.   Musculoskeletal:      Comments: Positive provocation sign Tinel sign overlying the dorsal left midfoot in the region of the 2nd metatarsal cuneiform joint region with palpable bony exostosis.    Plantar flexed 3rd and 4th metatarsal heads left foot.  Note dorsal subluxation of the 5th metatarsal head on clinical exam with rectus appearance of the 5th toe.  Flexible flexion contracture/curly toes of the 3rd and 4th toes with lateral deviation.    Semi rigid cavus foot structure bilateral foot.    Hoffman block test revealed neutral left hindfoot indicating most likely plantar flexed 1st ray contributing to cavus foot structure.   Skin:     General: Skin is warm.      Capillary Refill: Capillary refill takes less than 2 seconds.      Findings: No ecchymosis or erythema.      Nails: There is no clubbing.     Neurological:      Mental Status: She is alert and oriented to person, place, and time.       Sensory: Sensation is intact.      Motor: Motor function is intact.               Assessment:       Encounter Diagnoses   Name Primary?    Metatarsalgia, left foot Yes    Acquired curly toe, left     Bone spur of left foot     Entrapment neuropathy of left lower extremity          Plan:       Alyssa was seen today for foot pain.    Diagnoses and all orders for this visit:    Metatarsalgia, left foot    Acquired curly toe, left    Bone spur of left foot    Entrapment neuropathy of left lower extremity      I counseled the patient on her conditions, their implications and medical management.    Patient's curly toes appear to be contributing to her plantar flexed 3rd and 4th metatarsals as well as the malunited dorsiflexed 5th metatarsal.  She also has the exostosis on the dorsal left midfoot with entrapment neuropathy of the deep peroneal nerve.  We discussed continued conservative care such as a metatarsal pad, box lacing of her tennis shoes, activity restrictions modifications, corticosteroid injections, physical therapy versus surgical intervention consisting of a staged approach with initial percutaneous flexor tenotomy toes 3 and 4 with possible PIPJ arthrodesis since when she will soft tissue release and dorsiflex re-osteotomy of the 3rd and 4th metatarsal heads.  She also need an osteotomy of the 5th metatarsal to plantar flex and reduce the deformity.  In addition she would need exostectomy dorsal left midfoot with decompression of the deep peroneal nerve and possible tenotomy of the extensor hallucis brevis tendon.  Risks, benefits anticipate postoperative course were discussed in detail the patient today.  No guarantees were given or implied.    Weight-bearing left foot x-ray reviewed noting overall cavus foot structure with malunited 5th metatarsal in a dorsiflexed position.  No other significant pathology noted.    Patient will consider her options and return p.r.n. as discussed.  Continue conservative  care at this time.  Metatarsal pad was applied to the left foot.    A portion of this note was generated by voice recognition software and may contain spelling and grammar errors.      .

## 2020-10-02 ENCOUNTER — TELEPHONE (OUTPATIENT)
Dept: PODIATRY | Facility: CLINIC | Age: 40
End: 2020-10-02

## 2020-10-02 DIAGNOSIS — M20.5X2 ACQUIRED CURLY TOE, LEFT: Primary | ICD-10-CM

## 2020-10-02 DIAGNOSIS — M21.622 TAILOR'S BUNION OF LEFT FOOT: ICD-10-CM

## 2020-10-02 DIAGNOSIS — M21.6X2 PLANTAR FLEXED METATARSAL BONE OF LEFT FOOT: ICD-10-CM

## 2020-10-02 DIAGNOSIS — G57.92 ENTRAPMENT NEUROPATHY OF LEFT LOWER EXTREMITY: ICD-10-CM

## 2020-10-02 DIAGNOSIS — M21.6X2 PLANTAR FLEXED METATARSAL, LEFT: ICD-10-CM

## 2020-10-02 RX ORDER — SODIUM CHLORIDE 0.9 % (FLUSH) 0.9 %
10 SYRINGE (ML) INJECTION
Status: DISCONTINUED | OUTPATIENT
Start: 2020-10-02 | End: 2023-12-08

## 2020-10-02 NOTE — TELEPHONE ENCOUNTER
----- Message from Shameka Phillips sent at 10/2/2020  1:24 PM CDT -----  Regarding: Procedure  Contact: self/ [phone]  Patient requesting to speak with you regarding the upcoming procedure that is scheduled. Please call and advise.

## 2020-10-07 ENCOUNTER — PATIENT MESSAGE (OUTPATIENT)
Dept: SURGERY | Facility: HOSPITAL | Age: 40
End: 2020-10-07

## 2020-10-07 ENCOUNTER — TELEPHONE (OUTPATIENT)
Dept: FAMILY MEDICINE | Facility: CLINIC | Age: 40
End: 2020-10-07

## 2020-10-07 DIAGNOSIS — Z41.9 ELECTIVE SURGERY: ICD-10-CM

## 2020-10-07 NOTE — TELEPHONE ENCOUNTER
Called and spoke with patient. Scheduled patient 1 week post op appt and informed her about preop.

## 2020-10-07 NOTE — TELEPHONE ENCOUNTER
----- Message from Kuldip Najera LPN sent at 10/7/2020  2:01 PM CDT -----  Regarding: Surgery Clearance  Good evening,     Patient will be having surgery with Dr. Goetz on 11/6. She will need to be cleared for surgery 30 days prior. She had an appt with you connor on 7/29. Would this be sufficient for her surgery clearance? If not, can you please schedule her an appt to be cleared for surgery? Thank you.

## 2020-10-08 ENCOUNTER — PATIENT MESSAGE (OUTPATIENT)
Dept: SURGERY | Facility: HOSPITAL | Age: 40
End: 2020-10-08

## 2020-10-19 ENCOUNTER — CLINICAL SUPPORT (OUTPATIENT)
Dept: OTHER | Facility: CLINIC | Age: 40
End: 2020-10-19
Payer: COMMERCIAL

## 2020-10-19 ENCOUNTER — PATIENT MESSAGE (OUTPATIENT)
Dept: SURGERY | Facility: HOSPITAL | Age: 40
End: 2020-10-19

## 2020-10-19 DIAGNOSIS — Z00.8 ENCOUNTER FOR OTHER GENERAL EXAMINATION: ICD-10-CM

## 2020-10-20 VITALS — HEIGHT: 69 IN | BODY MASS INDEX: 30.86 KG/M2

## 2020-10-20 LAB
GLUCOSE SERPL-MCNC: 95 MG/DL (ref 60–140)
HDLC SERPL-MCNC: 71 MG/DL
POC CHOLESTEROL, LDL (DOCK): 98 MG/DL
POC CHOLESTEROL, TOTAL: 196 MG/DL
TRIGL SERPL-MCNC: 134 MG/DL

## 2020-10-26 ENCOUNTER — ANESTHESIA EVENT (OUTPATIENT)
Dept: SURGERY | Facility: HOSPITAL | Age: 40
End: 2020-10-26
Payer: COMMERCIAL

## 2020-10-28 NOTE — PROGRESS NOTES
FAMILY MEDICINE  [facility]    CC:   Chief Complaint   Patient presents with    Pre-op Exam     surgery clearance       SUBJECTIVE:  Alyssa Robledo   is a 40 y.o. female  -with obesity, anxiety and hypertension present for preoperative exam    She reports that she feel and broke her left 5th metatarsal 10/2019. She continue to have pain and failed conservative therapy. She is scheduled for surgery with Dr. Goetz on 2020. Prior to increased pain in her feet she was active and able to climb flight of stairs without issues and moderate exercise. She feels well today and denies any chest pain, dyspnea on exertion or shortness of breath.     1. Preoperative evaluation    Surgery: CORRECTION, HAMMER TOE (Left Foot) OSTEOTOMY, METATARSAL BONE (Left Foot)  Surgeon: Dr. Goetz  Date of Surgery: 2020  Hospital: [facility]  Anesthesia: general    Prior surgeries:  x 2  Reactions to anesthesia: vertigo with severe nausea and vomiting that responded well to scopolamine patches  Prior post-operative complications: denies      ROS: Review of Systems   Constitutional: Negative.    HENT: Negative.    Eyes: Negative.    Respiratory: Negative.    Cardiovascular: Negative.    Gastrointestinal: Negative.    Endocrine: Negative.    Genitourinary: Negative.    Musculoskeletal: Positive for arthralgias.        Otherwise negative   Skin: Negative.    Allergic/Immunologic: Negative.    Neurological: Negative.    Hematological: Negative.    Psychiatric/Behavioral: Negative.        Past Medical History:   Diagnosis Date    Anxiety     GERD (gastroesophageal reflux disease)        Past Surgical History:   Procedure Laterality Date     SECTION      2    DILATION AND CURETTAGE OF UTERUS         Family History   Problem Relation Age of Onset    No Known Problems Mother     No Known Problems Father     Breast cancer Maternal Grandmother     Heart disease Neg Hx     Cancer Neg Hx        Social  History     Tobacco Use    Smoking status: Never Smoker    Smokeless tobacco: Never Used   Substance Use Topics    Alcohol use: Yes     Alcohol/week: 0.8 standard drinks     Types: 1 Standard drinks or equivalent per week     Comment: 3     Drug use: No       Social History     Social History Narrative    SOCIAL HISTORY: Live in [place] with  and 2 daughters. 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 Non-smoker. No exposure to blood; No risk for STD's. Socially drinks alcohol;  is employed by [employer] as a .                      ALLERGIES:   Review of patient's allergies indicates:   Allergen Reactions    Phenergan [promethazine] Hives       MEDS:   Current Outpatient Medications:     azelastine (ASTELIN) 137 mcg (0.1 %) nasal spray, 1 spray (137 mcg total) by Nasal route 2 (two) times daily., Disp: 30 mL, Rfl: 11    butalbital-acetaminophen-caffeine -40 mg (FIORICET, ESGIC) -40 mg per tablet, Take 1 tablet by mouth every 4 (four) hours as needed for Pain., Disp: , Rfl:     cetirizine (ZYRTEC) 10 MG tablet, Take 10 mg by mouth once daily., Disp: , Rfl:     chlorthalidone (HYGROTEN) 25 MG Tab, , Disp: , Rfl:     fish oil-omega-3 fatty acids 300-1,000 mg capsule, Take 2 capsules by mouth once daily., Disp: , Rfl:     FLUoxetine 40 MG capsule, Take 1 capsule (40 mg total) by mouth once daily., Disp: 90 capsule, Rfl: 4    Lactobacillus acidophilus (PROBIOTIC) 10 billion cell Cap, Take 1 tablet by mouth once daily., Disp: , Rfl:     OCELLA 3-0.03 mg per tablet, , Disp: , Rfl:     ciprofloxacin HCl (CIPRO) 500 MG tablet, , Disp: , Rfl:     Current Facility-Administered Medications:     sodium chloride 0.9% flush 10 mL, 10 mL, Intravenous, PRN, Ha Goetz DPM    OBJECTIVE:   Vitals:    10/29/20 1122   BP: 127/80   BP Location: Left arm   Patient Position: Sitting   BP Method: X-Large (Manual)   Pulse: 78   Resp: 16   Temp: 97.8 °F (36.6 °C)   TempSrc: Oral  "  SpO2: 98%   Weight: 98.1 kg (216 lb 4.8 oz)   Height: 5' 9" (1.753 m)     Body mass index is 31.94 kg/m².    Physical Exam  Constitutional:       General: She is not in acute distress.  Neck:      Musculoskeletal: Neck supple.   Cardiovascular:      Rate and Rhythm: Normal rate and regular rhythm.      Pulses: Normal pulses.      Heart sounds: Normal heart sounds. No murmur. No friction rub. No gallop.    Pulmonary:      Effort: Pulmonary effort is normal.      Breath sounds: Normal breath sounds. No wheezing, rhonchi or rales.   Abdominal:      General: Bowel sounds are normal. There is no distension.      Palpations: Abdomen is soft.      Tenderness: There is no abdominal tenderness.   Musculoskeletal:      Right lower leg: No edema.      Left lower leg: No edema.   Neurological:      Mental Status: She is alert.           PERTINENT RESULTS:   No visits with results within 1 Week(s) from this visit.   Latest known visit with results is:   Clinical Support on 10/19/2020   Component Date Value Ref Range Status    POC Cholesterol, Total 10/19/2020 196  <240 MG/DL Final    POC Cholesterol, HDL 10/19/2020 71  MG/DL Final    POC Cholesterol, LDL 10/19/2020 98  <160 MG/DL Final    POC Triglycerides 10/19/2020 134  <160 MG/DL Final    POC Glucose 10/19/2020 95  60 - 140 MG/DL Final     BMP  Lab Results   Component Value Date     12/20/2019    K 3.5 12/20/2019     12/20/2019    CO2 25 12/20/2019    BUN 18 (H) 12/20/2019    CREATININE 0.58 12/20/2019    CALCIUM 9.4 12/20/2019    ANIONGAP 11 12/20/2019    ESTGFRAFRICA >60.0 12/20/2019    EGFRNONAA >60.0 12/20/2019     ASSESSMENT/PLAN:  Problem List Items Addressed This Visit        Cardiac/Vascular    Essential hypertension    Overview     - well controlled  - continue current medication         Relevant Orders    EKG 12-lead       Other    Preoperative examination - Primary    Overview     - low risk for low risk procedure  - EKG NSR with RBBB  - no acute " cardiopulmonary issues  - METs >7-10  - NSQIP below average to average risk and no further evaluation warranted  - okay to proceed with surgery         Relevant Orders    EKG 12-lead          ORDERS:   Orders Placed This Encounter    EKG 12-lead           Vaccines recommended: flu vaccine    Follow-up 6-12 months or sooner if any concerns    Dr. Alba Mayers D.O.   [facility]

## 2020-10-29 ENCOUNTER — OFFICE VISIT (OUTPATIENT)
Dept: FAMILY MEDICINE | Facility: CLINIC | Age: 40
End: 2020-10-29
Payer: COMMERCIAL

## 2020-10-29 VITALS
BODY MASS INDEX: 32.04 KG/M2 | SYSTOLIC BLOOD PRESSURE: 127 MMHG | DIASTOLIC BLOOD PRESSURE: 80 MMHG | TEMPERATURE: 98 F | RESPIRATION RATE: 16 BRPM | HEIGHT: 69 IN | OXYGEN SATURATION: 98 % | HEART RATE: 78 BPM | WEIGHT: 216.31 LBS

## 2020-10-29 DIAGNOSIS — I10 ESSENTIAL HYPERTENSION: ICD-10-CM

## 2020-10-29 DIAGNOSIS — Z01.818 PREOPERATIVE EXAMINATION: Primary | ICD-10-CM

## 2020-10-29 PROCEDURE — 99999 PR PBB SHADOW E&M-EST. PATIENT-LVL V: ICD-10-PCS | Mod: PBBFAC,,, | Performed by: FAMILY MEDICINE

## 2020-10-29 PROCEDURE — 93005 ELECTROCARDIOGRAM TRACING: CPT | Mod: S$GLB,,, | Performed by: FAMILY MEDICINE

## 2020-10-29 PROCEDURE — 3079F PR MOST RECENT DIASTOLIC BLOOD PRESSURE 80-89 MM HG: ICD-10-PCS | Mod: CPTII,S$GLB,, | Performed by: FAMILY MEDICINE

## 2020-10-29 PROCEDURE — 99999 PR PBB SHADOW E&M-EST. PATIENT-LVL V: CPT | Mod: PBBFAC,,, | Performed by: FAMILY MEDICINE

## 2020-10-29 PROCEDURE — 3008F PR BODY MASS INDEX (BMI) DOCUMENTED: ICD-10-PCS | Mod: CPTII,S$GLB,, | Performed by: FAMILY MEDICINE

## 2020-10-29 PROCEDURE — 99214 OFFICE O/P EST MOD 30 MIN: CPT | Mod: S$GLB,,, | Performed by: FAMILY MEDICINE

## 2020-10-29 PROCEDURE — 3079F DIAST BP 80-89 MM HG: CPT | Mod: CPTII,S$GLB,, | Performed by: FAMILY MEDICINE

## 2020-10-29 PROCEDURE — 93010 EKG 12-LEAD: ICD-10-PCS | Mod: S$GLB,,, | Performed by: INTERNAL MEDICINE

## 2020-10-29 PROCEDURE — 3074F SYST BP LT 130 MM HG: CPT | Mod: CPTII,S$GLB,, | Performed by: FAMILY MEDICINE

## 2020-10-29 PROCEDURE — 99214 PR OFFICE/OUTPT VISIT, EST, LEVL IV, 30-39 MIN: ICD-10-PCS | Mod: S$GLB,,, | Performed by: FAMILY MEDICINE

## 2020-10-29 PROCEDURE — 3008F BODY MASS INDEX DOCD: CPT | Mod: CPTII,S$GLB,, | Performed by: FAMILY MEDICINE

## 2020-10-29 PROCEDURE — 93010 ELECTROCARDIOGRAM REPORT: CPT | Mod: S$GLB,,, | Performed by: INTERNAL MEDICINE

## 2020-10-29 PROCEDURE — 3074F PR MOST RECENT SYSTOLIC BLOOD PRESSURE < 130 MM HG: ICD-10-PCS | Mod: CPTII,S$GLB,, | Performed by: FAMILY MEDICINE

## 2020-10-29 PROCEDURE — 93005 EKG 12-LEAD: ICD-10-PCS | Mod: S$GLB,,, | Performed by: FAMILY MEDICINE

## 2020-11-02 ENCOUNTER — PATIENT MESSAGE (OUTPATIENT)
Dept: SURGERY | Facility: HOSPITAL | Age: 40
End: 2020-11-02

## 2020-11-02 DIAGNOSIS — F41.1 GENERALIZED ANXIETY DISORDER: ICD-10-CM

## 2020-11-02 RX ORDER — FLUOXETINE HYDROCHLORIDE 40 MG/1
CAPSULE ORAL
Qty: 90 CAPSULE | Refills: 4 | Status: SHIPPED | OUTPATIENT
Start: 2020-11-02 | End: 2021-11-18

## 2020-11-03 ENCOUNTER — HOSPITAL ENCOUNTER (OUTPATIENT)
Dept: PREADMISSION TESTING | Facility: HOSPITAL | Age: 40
Discharge: HOME | End: 2020-11-03
Attending: PODIATRIST
Payer: COMMERCIAL

## 2020-11-03 ENCOUNTER — LAB VISIT (OUTPATIENT)
Dept: FAMILY MEDICINE | Facility: CLINIC | Age: 40
End: 2020-11-03
Payer: COMMERCIAL

## 2020-11-03 VITALS
HEART RATE: 80 BPM | SYSTOLIC BLOOD PRESSURE: 160 MMHG | OXYGEN SATURATION: 98 % | HEIGHT: 69 IN | BODY MASS INDEX: 32 KG/M2 | WEIGHT: 216.06 LBS | RESPIRATION RATE: 17 BRPM | DIASTOLIC BLOOD PRESSURE: 89 MMHG

## 2020-11-03 DIAGNOSIS — Z01.818 PREOP EXAMINATION: ICD-10-CM

## 2020-11-03 DIAGNOSIS — Z41.9 ELECTIVE SURGERY: ICD-10-CM

## 2020-11-03 PROCEDURE — U0003 INFECTIOUS AGENT DETECTION BY NUCLEIC ACID (DNA OR RNA); SEVERE ACUTE RESPIRATORY SYNDROME CORONAVIRUS 2 (SARS-COV-2) (CORONAVIRUS DISEASE [COVID-19]), AMPLIFIED PROBE TECHNIQUE, MAKING USE OF HIGH THROUGHPUT TECHNOLOGIES AS DESCRIBED BY CMS-2020-01-R: HCPCS

## 2020-11-03 RX ORDER — SODIUM CHLORIDE, SODIUM LACTATE, POTASSIUM CHLORIDE, CALCIUM CHLORIDE 600; 310; 30; 20 MG/100ML; MG/100ML; MG/100ML; MG/100ML
INJECTION, SOLUTION INTRAVENOUS CONTINUOUS
Status: CANCELLED | OUTPATIENT
Start: 2020-11-03

## 2020-11-03 RX ORDER — LIDOCAINE HYDROCHLORIDE 10 MG/ML
1 INJECTION, SOLUTION EPIDURAL; INFILTRATION; INTRACAUDAL; PERINEURAL ONCE
Status: CANCELLED | OUTPATIENT
Start: 2020-11-03 | End: 2020-11-03

## 2020-11-03 RX ORDER — SCOLOPAMINE TRANSDERMAL SYSTEM 1 MG/1
1 PATCH, EXTENDED RELEASE TRANSDERMAL
Status: CANCELLED | OUTPATIENT
Start: 2020-11-03

## 2020-11-03 NOTE — DISCHARGE INSTRUCTIONS
Your surgery is scheduled for 11/06/20    Please report to [facility] on the 1st Floor at 1000 a.m.    THIS TIME IS SUBJECT TO CHANGE.  YOU WILL RECEIVE A PHONE CALL THE DAY BEFORE SURGERY BY 3:30 PM TO CONFIRM YOUR TIME OF ARRIVAL.  IF YOU HAVE NOT RECEIVED A PHONE CALL BY 3:30 PM THE DAY BEFORE YOUR SURGERY PLEASE CALL [phone]     INSTRUCTIONS IMPORTANT!!!  ¨ Do not eat or drink after 12 midnight-including water. OK to brush teeth, no   gum, candy or mints!    ¨ Take only these medicines with a small swallow of water-morning of surgery.    Hygroten    ____  Proceed to [facility] on *** for additional testing.        ____  Do not wear makeup, including mascara.  ____  No powder, lotions or creams to surgical area.  ____  Please remove all jewelry, including piercings and leave at home.  ____  No money or valuables needed. Please leave at home.  ____  Please bring any documents given by your doctor.  ____  If going home the same day, arrange for a ride home. You will not be able to             drive if Anesthesia was used.  ____  Children under 18 years require a parent / guardian present the entire time             they are in surgery / recovery.  ____  Wear loose fitting clothing. Allow for dressings, bandages.  ____  Stop Aspirin, Ibuprofen, Motrin and Aleve at least 3-5 days before surgery, unless otherwise instructed by your doctor, or the nurse.   You MAY use Tylenol/acetaminophen until day of surgery.  ____  Wash the surgical area with Hibiclens the night before surgery, and again the             morning of surgery.  Be sure to rinse hibiclens off completely (if instructed by   nurse).  ____  If you take diabetic medication, do not take am of surgery unless instructed by Doctor.  ____  Call MD for temperature above 101 degrees or any other signs of infection such as Urinary (bladder) infection, Upper respiratory infection, skin boils, etc.  ____ Stop taking any Fish Oil supplement or any  Vitamins that contain Vitamin E at least 5 days prior to surgery.  ____ Do Not wear your contact lenses the day of your procedure.  You may wear your glasses.      ____Do not shave surgical site for 3 days prior to surgery.  ____ Practice Good hand washing before, during, and after procedure.      I have read or had read and explained to me, and understand the above information.  Additional comments or instructions:  For additional questions call [phone]      ANESTHESIA SIDE EFFECTS  -For the first 24 hours after surgery:  Do not drive, use heavy equipment, make important decisions, or drink alcohol  -It is normal to feel sleepy for several hours.  Rest until you are more awake.  -Have someone stay with you, if needed.  They can watch for problems and help keep you safe.  -Some possible post anesthesia side effects include: nausea and vomiting, sore throat and hoarseness, sleepiness, and dizziness.        Pre-Op Bathing Instructions    Before surgery, you can play an important role in your own health.    Because skin is not sterile, we need to be sure that your skin is as free of germs as possible. By following the instructions below, you can reduce the number of germs on your skin before surgery.    IMPORTANT: You will need to shower with a special soap called Hibiclens*, available at any pharmacy.  If you are allergic to Chlorhexidine (the antiseptic in Hibiclens), use an antibacterial soap such as Dial Soap for your preoperative shower.  You will shower with Hibiclens both the night before your surgery and the morning of your surgery.  Do not use Hibiclens on the head, face or genitals to avoid injury to those areas.    STEP #1: THE NIGHT BEFORE YOUR SURGERY     1. Do not shave the area of your body where your surgery will be performed.  2. Shower and wash your hair and body as usual with your normal soap and shampoo.  3. Rinse your hair and body thoroughly after you shower to remove all soap residue.  4. With  your hand, apply one packet of Hibiclens soap to the surgical site.   5. Wash the site gently for five (5) minutes. Do not scrub your skin too hard.   6. Do not wash with your regular soap after Hibiclens is used.  7. Rinse your body thoroughly.  8. Pat yourself dry with a clean, soft towel.  9. Do not use lotion, cream, or powder.  10. Wear clean clothes.    STEP #2: THE MORNING OF YOUR SURGERY     1. Repeat Step #1.    * Not to be used by people allergic to Chlorhexidine.

## 2020-11-03 NOTE — ANESTHESIA PREPROCEDURE EVALUATION
"                                                                                                             2020  Alyssa Robledo is a 40 y.o., female scheduled for left hammer toe correction and left metatarsal osteotomy on 2020.    PCP H&P and risk stratification in Epic (10/29/2020), "low risk for low risk procedure"    Past Medical History:   Diagnosis Date    Anxiety     GERD (gastroesophageal reflux disease)      Past Surgical History:   Procedure Laterality Date     SECTION      2    DILATION AND CURETTAGE OF UTERUS       Anesthesia Evaluation     I have reviewed the Nursing Notes.    I have reviewed the Medications.     Review of Systems  Anesthesia Hx:  Hx of Anesthetic complications Personal Hx of Anesthesia complications, Post-Operative Nausea/Vomiting (severe, scop patch works well)   Social:  Social Alcohol Use, Non-Smoker    Hematology/Oncology:  Hematology Normal        Cardiovascular:   Exercise tolerance: good Hypertension  Denies Angina.     ECG has been reviewed.    Pulmonary:  Pulmonary Normal  Denies Shortness of breath.    Renal/:  Renal/ Normal     Hepatic/GI:   GERD    Neurological:   Headaches (migraines)    Endocrine:  Endocrine Normal        Physical Exam  General:  Obesity    Airway/Jaw/Neck:  Airway Findings: Mouth Opening: Normal Mallampati: II       Chest/Lungs:  Chest/Lungs Findings: Clear to auscultation, Normal Respiratory Rate     Heart/Vascular:  Heart Findings: Rate: Normal  Rhythm: Regular Rhythm  Sounds: Normal        Mental Status:  Mental Status Findings:  Cooperative, Alert and Oriented       EKG 10/29/2020  Normal sinus rhythm   Incomplete right bundle branch block   Cannot rule out Anterior infarct ,age undetermined   Abnormal ECG   No previous ECGs available         Anesthesia Plan  Type of Anesthesia, risks & benefits discussed:  Anesthesia Type:  MAC, regional  Patient's Preference:   Intra-op Monitoring Plan: standard ASA monitors  Intra-op " Monitoring Plan Comments:   Post Op Pain Control Plan: multimodal analgesia and peripheral nerve block  Post Op Pain Control Plan Comments:   Induction:   IV  Beta Blocker:  Patient is not currently on a Beta-Blocker (No further documentation required).       Informed Consent:    ASA Score: 2     Day of Surgery Review of History & Physical:        Anesthesia Plan Notes: Anesthesia consent to be signed prior to procedure on 11/6/2020  Scopolamine patch ordered          Ready For Surgery From Anesthesia Perspective.

## 2020-11-03 NOTE — PRE-PROCEDURE INSTRUCTIONS
Osvaldo [id number] Marc [phone]  Allergies, medical, surgical, family and psychosocial histories reviewed with patient. Periop plan of care reviewed. Preop instructions given, including medications to take and to hold. Hibiclens soap and instructions on use given. Time allotted for questions to be addressed.  Patient verbalized understanding.

## 2020-11-04 LAB — SARS-COV-2 RNA RESP QL NAA+PROBE: NOT DETECTED

## 2020-11-06 ENCOUNTER — ANESTHESIA (OUTPATIENT)
Dept: SURGERY | Facility: HOSPITAL | Age: 40
End: 2020-11-06
Payer: COMMERCIAL

## 2020-11-06 ENCOUNTER — HOSPITAL ENCOUNTER (OUTPATIENT)
Facility: HOSPITAL | Age: 40
Discharge: HOME | End: 2020-11-06
Attending: PODIATRIST | Admitting: PODIATRIST
Payer: COMMERCIAL

## 2020-11-06 VITALS
SYSTOLIC BLOOD PRESSURE: 122 MMHG | OXYGEN SATURATION: 100 % | TEMPERATURE: 98 F | WEIGHT: 216 LBS | HEIGHT: 69 IN | BODY MASS INDEX: 31.99 KG/M2 | DIASTOLIC BLOOD PRESSURE: 71 MMHG | RESPIRATION RATE: 18 BRPM | HEART RATE: 74 BPM

## 2020-11-06 DIAGNOSIS — G57.92 ENTRAPMENT NEUROPATHY OF LEFT LOWER EXTREMITY: ICD-10-CM

## 2020-11-06 DIAGNOSIS — Z01.818 PREOP EXAMINATION: ICD-10-CM

## 2020-11-06 DIAGNOSIS — S92.302P: ICD-10-CM

## 2020-11-06 DIAGNOSIS — Z98.890 POSTOPERATIVE STATE: Primary | ICD-10-CM

## 2020-11-06 DIAGNOSIS — M21.622 TAILOR'S BUNION OF LEFT FOOT: ICD-10-CM

## 2020-11-06 DIAGNOSIS — M20.5X2 ACQUIRED CURLY TOE, LEFT: ICD-10-CM

## 2020-11-06 DIAGNOSIS — M89.8X7 EXOSTOSIS OF LEFT FOOT: ICD-10-CM

## 2020-11-06 DIAGNOSIS — M21.6X2 PLANTAR FLEXED METATARSAL BONE OF LEFT FOOT: ICD-10-CM

## 2020-11-06 DIAGNOSIS — M21.6X2 PLANTAR FLEXED METATARSAL, LEFT: ICD-10-CM

## 2020-11-06 PROCEDURE — 36000708 HC OR TIME LEV III 1ST 15 MIN: Performed by: PODIATRIST

## 2020-11-06 PROCEDURE — 28234 INCISION OF FOOT TENDON: CPT | Mod: 59,LT,, | Performed by: PODIATRIST

## 2020-11-06 PROCEDURE — 25000003 PHARM REV CODE 250: Performed by: NURSE PRACTITIONER

## 2020-11-06 PROCEDURE — 28122 PR PART REMV OTHR TARSAL/METATARSAL: ICD-10-PCS | Mod: 51,LT,, | Performed by: PODIATRIST

## 2020-11-06 PROCEDURE — 25000003 PHARM REV CODE 250: Performed by: NURSE ANESTHETIST, CERTIFIED REGISTERED

## 2020-11-06 PROCEDURE — 63600175 PHARM REV CODE 636 W HCPCS: Performed by: NURSE PRACTITIONER

## 2020-11-06 PROCEDURE — 25000003 PHARM REV CODE 250: Performed by: PODIATRIST

## 2020-11-06 PROCEDURE — 63600175 PHARM REV CODE 636 W HCPCS: Performed by: PODIATRIST

## 2020-11-06 PROCEDURE — 64708 PR NEUROPLASTY OTHER ARM/LEG NERVE,OPEN: ICD-10-PCS | Mod: LT,,, | Performed by: PODIATRIST

## 2020-11-06 PROCEDURE — 28288 PARTIAL REMOVAL OF FOOT BONE: CPT | Mod: 59,T3,, | Performed by: PODIATRIST

## 2020-11-06 PROCEDURE — 71000015 HC POSTOP RECOV 1ST HR: Performed by: PODIATRIST

## 2020-11-06 PROCEDURE — 63600175 PHARM REV CODE 636 W HCPCS: Performed by: NURSE ANESTHETIST, CERTIFIED REGISTERED

## 2020-11-06 PROCEDURE — 76942 ECHO GUIDE FOR BIOPSY: CPT | Performed by: STUDENT IN AN ORGANIZED HEALTH CARE EDUCATION/TRAINING PROGRAM

## 2020-11-06 PROCEDURE — 28234 PR INCISION EXTEN FOOT/TOE TENDON: ICD-10-PCS | Mod: 59,LT,, | Performed by: PODIATRIST

## 2020-11-06 PROCEDURE — 27201423 OPTIME MED/SURG SUP & DEVICES STERILE SUPPLY: Performed by: PODIATRIST

## 2020-11-06 PROCEDURE — 37000009 HC ANESTHESIA EA ADD 15 MINS: Performed by: PODIATRIST

## 2020-11-06 PROCEDURE — 28010 PR INCISION SUBCUT TOE TENDON: ICD-10-PCS | Mod: 51,T1,, | Performed by: PODIATRIST

## 2020-11-06 PROCEDURE — 36000709 HC OR TIME LEV III EA ADD 15 MIN: Performed by: PODIATRIST

## 2020-11-06 PROCEDURE — C1769 GUIDE WIRE: HCPCS | Performed by: PODIATRIST

## 2020-11-06 PROCEDURE — 28010 INCISION OF TOE TENDON: CPT | Mod: 51,T1,, | Performed by: PODIATRIST

## 2020-11-06 PROCEDURE — 63600175 PHARM REV CODE 636 W HCPCS: Performed by: STUDENT IN AN ORGANIZED HEALTH CARE EDUCATION/TRAINING PROGRAM

## 2020-11-06 PROCEDURE — 28122 PARTIAL REMOVAL OF FOOT BONE: CPT | Mod: 51,LT,, | Performed by: PODIATRIST

## 2020-11-06 PROCEDURE — 37000008 HC ANESTHESIA 1ST 15 MINUTES: Performed by: PODIATRIST

## 2020-11-06 PROCEDURE — 28308 INCISION OF METATARSAL: CPT | Mod: 59,T4,, | Performed by: PODIATRIST

## 2020-11-06 PROCEDURE — C1713 ANCHOR/SCREW BN/BN,TIS/BN: HCPCS | Performed by: PODIATRIST

## 2020-11-06 PROCEDURE — 64708 REVISE ARM/LEG NERVE: CPT | Mod: LT,,, | Performed by: PODIATRIST

## 2020-11-06 PROCEDURE — 28308 PR OSTEOTOMY METATARSAL (NOT 1ST): ICD-10-PCS | Mod: 59,T4,, | Performed by: PODIATRIST

## 2020-11-06 PROCEDURE — 28288 PR PART REMV BONE METATARSAL HEAD,EA: ICD-10-PCS | Mod: 59,T3,, | Performed by: PODIATRIST

## 2020-11-06 DEVICE — IMPLANTABLE DEVICE
Type: IMPLANTABLE DEVICE | Site: FOOT | Status: NON-FUNCTIONAL
Removed: 2022-06-08

## 2020-11-06 DEVICE — WIRE K SMALL BALL BB-TAK
Type: IMPLANTABLE DEVICE | Site: FOOT | Status: NON-FUNCTIONAL
Removed: 2022-06-08

## 2020-11-06 RX ORDER — OXYCODONE AND ACETAMINOPHEN 10; 325 MG/1; MG/1
1 TABLET ORAL EVERY 4 HOURS PRN
Qty: 30 TABLET | Refills: 0 | Status: SHIPPED | OUTPATIENT
Start: 2020-11-06 | End: 2020-11-12

## 2020-11-06 RX ORDER — PROPOFOL 10 MG/ML
VIAL (ML) INTRAVENOUS
Status: DISCONTINUED | OUTPATIENT
Start: 2020-11-06 | End: 2020-11-06

## 2020-11-06 RX ORDER — HYDROCODONE BITARTRATE AND ACETAMINOPHEN 5; 325 MG/1; MG/1
1 TABLET ORAL EVERY 4 HOURS PRN
Status: CANCELLED | OUTPATIENT
Start: 2020-11-06

## 2020-11-06 RX ORDER — CEFAZOLIN SODIUM 2 G/50ML
2 SOLUTION INTRAVENOUS
Status: COMPLETED | OUTPATIENT
Start: 2020-11-06 | End: 2020-11-06

## 2020-11-06 RX ORDER — LIDOCAINE HYDROCHLORIDE 10 MG/ML
1 INJECTION, SOLUTION EPIDURAL; INFILTRATION; INTRACAUDAL; PERINEURAL ONCE
Status: DISCONTINUED | OUTPATIENT
Start: 2020-11-06 | End: 2020-11-06 | Stop reason: HOSPADM

## 2020-11-06 RX ORDER — MIDAZOLAM HYDROCHLORIDE 1 MG/ML
INJECTION, SOLUTION INTRAMUSCULAR; INTRAVENOUS
Status: DISCONTINUED | OUTPATIENT
Start: 2020-11-06 | End: 2020-11-06

## 2020-11-06 RX ORDER — CELECOXIB 200 MG/1
200 CAPSULE ORAL 2 TIMES DAILY
Qty: 30 CAPSULE | Refills: 1 | Status: SHIPPED | OUTPATIENT
Start: 2020-11-06 | End: 2021-12-21

## 2020-11-06 RX ORDER — BUPIVACAINE HYDROCHLORIDE 2.5 MG/ML
INJECTION, SOLUTION EPIDURAL; INFILTRATION; INTRACAUDAL
Status: DISCONTINUED | OUTPATIENT
Start: 2020-11-06 | End: 2020-11-06 | Stop reason: HOSPADM

## 2020-11-06 RX ORDER — PROPOFOL 10 MG/ML
INJECTION, EMULSION INTRAVENOUS CONTINUOUS PRN
Status: DISCONTINUED | OUTPATIENT
Start: 2020-11-06 | End: 2020-11-06

## 2020-11-06 RX ORDER — LIDOCAINE HCL/PF 100 MG/5ML
SYRINGE (ML) INTRAVENOUS
Status: DISCONTINUED | OUTPATIENT
Start: 2020-11-06 | End: 2020-11-06

## 2020-11-06 RX ORDER — ONDANSETRON 2 MG/ML
INJECTION INTRAMUSCULAR; INTRAVENOUS
Status: DISCONTINUED | OUTPATIENT
Start: 2020-11-06 | End: 2020-11-06

## 2020-11-06 RX ORDER — SODIUM CHLORIDE, SODIUM LACTATE, POTASSIUM CHLORIDE, CALCIUM CHLORIDE 600; 310; 30; 20 MG/100ML; MG/100ML; MG/100ML; MG/100ML
INJECTION, SOLUTION INTRAVENOUS CONTINUOUS
Status: DISCONTINUED | OUTPATIENT
Start: 2020-11-06 | End: 2020-11-06 | Stop reason: HOSPADM

## 2020-11-06 RX ORDER — SCOLOPAMINE TRANSDERMAL SYSTEM 1 MG/1
1 PATCH, EXTENDED RELEASE TRANSDERMAL
Status: DISCONTINUED | OUTPATIENT
Start: 2020-11-06 | End: 2020-11-06 | Stop reason: HOSPADM

## 2020-11-06 RX ORDER — HYDROCHLOROTHIAZIDE 25 MG/1
25 TABLET ORAL DAILY
COMMUNITY
End: 2021-01-18

## 2020-11-06 RX ORDER — ROPIVACAINE HYDROCHLORIDE 5 MG/ML
INJECTION, SOLUTION EPIDURAL; INFILTRATION; PERINEURAL
Status: DISCONTINUED | OUTPATIENT
Start: 2020-11-06 | End: 2020-11-06

## 2020-11-06 RX ORDER — LIDOCAINE HYDROCHLORIDE 10 MG/ML
INJECTION, SOLUTION EPIDURAL; INFILTRATION; INTRACAUDAL; PERINEURAL
Status: DISCONTINUED | OUTPATIENT
Start: 2020-11-06 | End: 2020-11-06 | Stop reason: HOSPADM

## 2020-11-06 RX ORDER — CEPHALEXIN 500 MG/1
500 CAPSULE ORAL 4 TIMES DAILY
Qty: 28 CAPSULE | Refills: 0 | Status: SHIPPED | OUTPATIENT
Start: 2020-11-06 | End: 2020-12-07

## 2020-11-06 RX ORDER — DEXAMETHASONE SODIUM PHOSPHATE 4 MG/ML
INJECTION, SOLUTION INTRA-ARTICULAR; INTRALESIONAL; INTRAMUSCULAR; INTRAVENOUS; SOFT TISSUE
Status: DISCONTINUED | OUTPATIENT
Start: 2020-11-06 | End: 2020-11-06

## 2020-11-06 RX ADMIN — LIDOCAINE HYDROCHLORIDE 100 MG: 20 INJECTION, SOLUTION INTRAVENOUS at 12:11

## 2020-11-06 RX ADMIN — DEXAMETHASONE SODIUM PHOSPHATE 8 MG: 4 INJECTION, SOLUTION INTRA-ARTICULAR; INTRALESIONAL; INTRAMUSCULAR; INTRAVENOUS; SOFT TISSUE at 12:11

## 2020-11-06 RX ADMIN — SODIUM CHLORIDE, SODIUM LACTATE, POTASSIUM CHLORIDE, AND CALCIUM CHLORIDE: .6; .31; .03; .02 INJECTION, SOLUTION INTRAVENOUS at 12:11

## 2020-11-06 RX ADMIN — PROPOFOL 35 MG: 10 INJECTION, EMULSION INTRAVENOUS at 12:11

## 2020-11-06 RX ADMIN — SCOPALAMINE 1 PATCH: 1 PATCH, EXTENDED RELEASE TRANSDERMAL at 11:11

## 2020-11-06 RX ADMIN — ROPIVACAINE HYDROCHLORIDE 30 ML: 5 INJECTION, SOLUTION EPIDURAL; INFILTRATION; PERINEURAL at 11:11

## 2020-11-06 RX ADMIN — ONDANSETRON 8 MG: 2 INJECTION, SOLUTION INTRAMUSCULAR; INTRAVENOUS at 12:11

## 2020-11-06 RX ADMIN — PROPOFOL 150 MCG/KG/MIN: 10 INJECTION, EMULSION INTRAVENOUS at 12:11

## 2020-11-06 RX ADMIN — MIDAZOLAM 5 MG: 1 INJECTION INTRAMUSCULAR; INTRAVENOUS at 11:11

## 2020-11-06 RX ADMIN — CEFAZOLIN SODIUM 2 G: 2 SOLUTION INTRAVENOUS at 12:11

## 2020-11-06 NOTE — ANESTHESIA POSTPROCEDURE EVALUATION
Anesthesia Post Evaluation    Patient: Alyssa Robledo    Procedure(s) Performed: Procedure(s) (LRB):  CORRECTION, HAMMER TOE (Left)  OSTEOTOMY, METATARSAL BONE (Left)    Final Anesthesia Type: MAC    Patient location during evaluation: [facility]  Patient participation: Yes- Able to Participate  Level of consciousness: awake and alert and oriented  Post-procedure vital signs: reviewed and stable  Pain management: adequate  Airway patency: patent    PONV status at discharge: No PONV  Anesthetic complications: no      Cardiovascular status: blood pressure returned to baseline, hemodynamically stable and stable  Respiratory status: room air, unassisted and spontaneous ventilation  Hydration status: euvolemic  Follow-up not needed.          Vitals Value Taken Time   /69 11/06/20 1431   Temp 36.9 11/06/20 1431   Pulse 72 11/06/20 1431   Resp 20 11/06/20 1431   SpO2 100 11/06/20 1431         No case tracking events are documented in the log.      Pain/Jacoby Score: No data recorded

## 2020-11-06 NOTE — PLAN OF CARE
1138: timeout completed with Dr. Fair and Dr. Molina for left adductor canal/popliteal nerve block; consents x 2 verified    1141: nerve block started; vss; no signs of discomfort noted    1148: nerve block finished; vss; pt tolerated well

## 2020-11-06 NOTE — BRIEF OP NOTE
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 Remainder of findings per above.    Estimated Blood Loss: 1 mL       Specimens:   Specimen (12h ago, onward)    None            Discharge Note    OUTCOME: Patient tolerated treatment/procedure well without complication and is now ready for discharge.    DISPOSITION: Home or Self Care    FINAL DIAGNOSIS:  Closed fracture of head of metatarsal, left, with malunion, subsequent encounter    FOLLOWUP: In clinic    DISCHARGE INSTRUCTIONS:    Discharge Procedure Orders   Diet general     Ice to affected area   Order Comments: Behind left knee for 20-30 minute intervals throughout the day x 3-5 days after surgery     Keep surgical extremity elevated   Order Comments: Above heart level     Call MD for:  temperature >100.4     Call MD for:  persistent nausea and vomiting     Call MD for:  severe uncontrolled pain     Call MD for:  difficulty  breathing, headache or visual disturbances     Leave dressing on - Keep it clean, dry, and intact until clinic visit   Order Comments: May loosen outer ace wrap if too tight. May remove boot at rest.     Weight bearing restrictions (specify)   Order Comments: Heel weightbearing with orthopedic boot left foot using crutches.

## 2020-11-06 NOTE — H&P
SUBJECTIVE:     Procedure: decompression of the deep peroneal and medial dorsal cutaneous nerves with dorsal exostectomy and EHB tenotomy left foot. Percutaneous flexor tenotomy toes 2-4 left, possible fourth metatarsal osteotomy and fifth metatarsal osteotomy left foot.    Chief Complaint/Diagnosis: History of malunited fifth met fracture left foot and chronic foot pain that failed conservative care.    Facility-Administered Medications Prior to Admission   Medication    sodium chloride 0.9% flush 10 mL     PTA Medications   Medication Sig    hydroCHLOROthiazide (HYDRODIURIL) 25 MG tablet Take 25 mg by mouth once daily.    azelastine (ASTELIN) 137 mcg (0.1 %) nasal spray 1 spray (137 mcg total) by Nasal route 2 (two) times daily.    butalbital-acetaminophen-caffeine -40 mg (FIORICET, ESGIC) -40 mg per tablet Take 1 tablet by mouth every 4 (four) hours as needed for Pain.    cetirizine (ZYRTEC) 10 MG tablet Take 10 mg by mouth once daily.    chlorthalidone (HYGROTEN) 25 MG Tab     ciprofloxacin HCl (CIPRO) 500 MG tablet     fish oil-omega-3 fatty acids 300-1,000 mg capsule Take 2 capsules by mouth once daily.    FLUoxetine 40 MG capsule TAKE 1 CAPSULE BY MOUTH ONCE DAILY     Lactobacillus acidophilus (PROBIOTIC) 10 billion cell Cap Take 1 tablet by mouth once daily.    OCELLA 3-0.03 mg per tablet        Review of patient's allergies indicates:   Allergen Reactions    Phenergan [promethazine] Hives       Past Medical History:   Diagnosis Date    Anxiety     GERD (gastroesophageal reflux disease)      Past Surgical History:   Procedure Laterality Date     SECTION      2    DILATION AND CURETTAGE OF UTERUS       Family History   Problem Relation Age of Onset    No Known Problems Mother     No Known Problems Father     Breast cancer Maternal Grandmother     Heart disease Neg Hx     Cancer Neg Hx      Social History     Tobacco Use    Smoking status: Never Smoker     Smokeless tobacco: Never Used   Substance Use Topics    Alcohol use: Yes     Alcohol/week: 0.8 standard drinks     Types: 1 Standard drinks or equivalent per week     Comment: 3     Drug use: No        Review of Systems:  Constitutional: no fever or chills  Eyes: no visual changes  ENT: no nasal congestion or sore throat  Respiratory: no cough or shortness of breath  Cardiovascular: no chest pain or palpitations  Gastrointestinal: no nausea or vomiting, tolerating diet  Genitourinary: no hematuria or dysuria  Integument/Breast: no rash or pruritis  Hematologic/Lymphatic: no easy bruising or lymphadenopathy  Musculoskeletal: no arthralgias or myalgias  Neurological: no seizures or tremors  Behavioral/Psych: no auditory or visual hallucinations  Endocrine: no heat or cold intolerance    OBJECTIVE:     Vital Signs (Most Recent)  Temp: 97.9 °F (36.6 °C) (11/06/20 1104)  Pulse: 74 (11/06/20 1143)  Resp: 16 (11/06/20 1143)  BP: 129/81 (11/06/20 1143)  SpO2: 100 % (11/06/20 1143)    Physical Exam:  General: well developed, well nourished, appears stated age, no distress  Extremities: warm, well perfused and no cyanosis or edema, or clubbing  Pulses: 2+ and symmetric  Skin: Skin color, texture, turgor normal. No rashes or lesions  Neurologic: Alert and oriented. Thought content appropriate     Positive provocation sign Tinel sign overlying the dorsal left midfoot in the region of the 2nd metatarsal cuneiform joint region with palpable bony exostosis.    Plantar flexed 3rd and 4th metatarsal heads left foot.  Note dorsal subluxation of the 5th metatarsal head on clinical exam with rectus appearance of the 5th toe.  Flexible flexion contracture/curly toes of the 3rd and 4th toes with lateral deviation.    Semi rigid cavus foot structure bilateral foot.

## 2020-11-06 NOTE — TRANSFER OF CARE
"Anesthesia Transfer of Care Note    Patient: Alyssa Robledo    Procedure(s) Performed: Procedure(s) (LRB):  CORRECTION, HAMMER TOE (Left)  OSTEOTOMY, METATARSAL BONE (Left)    Patient location: [facility]    Anesthesia Type: MAC and regional    Transport from OR: Transported from OR on room air with adequate spontaneous ventilation    Post pain: adequate analgesia    Post assessment: no apparent anesthetic complications    Post vital signs: stable    Level of consciousness: awake, alert and oriented    Nausea/Vomiting: no nausea/vomiting    Complications: none    Transfer of care protocol was followed      Last vitals:   Visit Vitals  /81   Pulse 74   Temp 36.6 °C (97.9 °F) (Skin)   Resp 16   Ht 5' 9" (1.753 m)   Wt 98 kg (216 lb)   LMP 11/04/2020   SpO2 100%   Breastfeeding No   BMI 31.90 kg/m²     "

## 2020-11-06 NOTE — DISCHARGE INSTRUCTIONS
Discharge Instructions for Foot Surgery  Arrange to have an adult drive you home after surgery. If you had general anesthesia, it may take a day or more to fully recover. So, for at least the next 24 hours: Do not drive or use machinery or power tools; do not drink alcohol; and do not make any major decisions.  Diet  Here are some dietary suggestions following surgery:   · Start with liquids and light foods (like dry toast, bananas, and applesauce). As you feel up to it, slowly return to your normal diet.  · Drink at least 6 to 8 glasses of water or other nonalcoholic fluids a day.  · To avoid nausea, eat before taking narcotic pain medicines.  Medicines  It is important to follow these directions:   · Take all medicines as instructed.  · Take pain medicines on time. Do not wait until the pain is bad before taking your medicines.  · Avoid alcohol while on pain medicines.  Activity  These instructions are to help with your recovery:   · Sit or lie down when possible. Put a pillow under your heel to raise your foot above the level of your heart.  · Wrap an ice pack or bag of frozen peas in a thin cloth. Place it over your bandaged foot for no longer than 20 minutes. Do this three times a day.  · You can drive again in seven days or as instructed by your healthcare provider.  · Wear your surgical shoe at all times unless told otherwise by your healthcare provider.  · Use crutches or a cane as directed.  · Follow your healthcare providers instructions about putting weight on your foot.  Bandage and cast care  Here are tips to follow:   · Do not shower for 48 hours.  · When you can shower again, cover the bandage or cast with a plastic bag to keep it dry.  · Dont remove your bandage until your healthcare provider tells you to. If your bandage gets wet or dirty, check with your healthcare provider. You can likely replace it with a clean, dry one.  What to expect  It is normal to have the following:  · Bruising and  slight swelling of the foot and toes  · A small amount of blood on the dressing  Call your healthcare provider   Contact your healthcare provider right away if you have any of the following:   · Continuous bleeding through the bandage  · Excessive swelling, increased bleeding, or redness  · Fever over 100.4°F (38°C) or chills  · Pain unrelieved by pain medicines  · Foot feels cold to the touch or numb  · Increased ache in your leg or foot  · Chest pain or shortness of breath  · Anything unusual that concerns you     ANESTHESIA  -For the first 24 hours after surgery:  Do not drive, use heavy equipment, make important decisions, or drink alcohol  -It is normal to feel sleepy for several hours.  Rest until you are more awake.  -Have someone stay with you, if needed.  They can watch for problems and help keep you safe.  -Some possible post anesthesia side effects include: nausea and vomiting, sore throat and hoarseness, sleepiness, and dizziness.    PAIN  -If you have pain after surgery, pain medicine will help you feel better.  Take it as directed, before pain becomes severe.  Most pain relievers taken by mouth need at least 20-30 minutes to start working.  -Do not drive or drink alcohol while taking pain medicine.  -Pain medication can upset your stomach.  Taking them with a little food may help.  -Other ways to help control pain: elevation, ice, and relaxation  -Call your surgeon if still having unmanageable pain an hour after taking pain medicine.  -Pain medicine can cause constipation.  Taking an over-the counter stool softener while on prescription pain medicine and drinking plenty of fluids can prevent this side effect.  -Call your surgeon if you have severe side effects like: breathing problems, trouble waking up, dizziness, confusion, or severe constipation.    NAUSEA  -Some people have nausea after surgery.  This is often because of anesthesia, pain, pain medicine, or the stress of surgery.  -Do not push  yourself to eat.  Start off with clear liquids and soup.  Slowly move to solid foods.  Don't eat fatty, rich, spicy foods at first.  Eat smaller amounts.  -If you develop persistent nausea and vomiting please notify your surgeon immediately.    BLEEDING  -Different types of surgery require different types of care and dressing changes.  It is important to follow all instructions and advice from your surgeon.  Change dressing as directed.  Call your surgeon for any concerns regarding postop bleeding.    SIGNS OF INFECTION  -Signs of infection include: fever, swelling, drainage, and redness  -Notify your surgeon if you have a fever of 100.4 F (38.0 C) or higher.  -Notify your surgeon if you notice redness, swelling, increased pain, pus, or a foul smell at the incision site.    Rest, ice and elevation of the left foot.  Notify Dr. Goetz for any problems or concerns.

## 2020-11-06 NOTE — OP NOTE
Date of surgery:  11/06/2020  Surgeon:YARELI Arambula DPM  Assistant:  None  Preoperative diagnosis:  1.  Entrapment of peripheral nerves left lower extremity 2.  Exostosis dorsal left midfoot 3.  Plantar flexed 4th metatarsal head left foot 4.  Malunited 5th metatarsal neck fracture left foot 5.  Acquired curly toe deformity toes 2-4 left foot  Postoperative diagnosis:  Same  Procedures:  1.  Release/decompression of the dorsal medial cutaneous in deep peroneal nerves left foot 2.  Extensor hallucis brevis tenotomy left foot 3.  Exostectomy dorsal left midfoot 4.  Fourth metatarsal head osteotomy left foot 5.  Fifth metatarsal metaphyseal/neck osteotomy 6.  Percutaneous flexor tenotomies toes 2-4 left foot  Pathology:  None  Anesthesia:  Mac with regional anesthetic  Hemostasis:  Left calf tourniquet inflated 250 mm Hg  Estimated blood loss:  None  Materials:  0.045 in K-wire x1:  4 hole Arthrex locking plate, 4-0 Vicryl suture, 5-0 Monocryl suture  Injectables:  Infiltrated 10 mL 1:1 mixture containing 2% plain lidocaine and 0.25% plain Marcaine  Complications:  None    Procedures in detail:  The above-named patient was identified, brought into operating room and placed supine on the operating table.  A time-out was called identifying the patient, procedures and extremity.  Regional anesthetic was administered prior to the patient being brought into the OR by the anesthesia team.  Mac anesthesia was administered per the anesthesia team.  A nonsterile left calf pneumatic tourniquet was applied.  The left lower extremity then prepped and draped in usual sterile technique.  The left foot was then exsanguinated and the left calf pneumatic tourniquet was inflated 250 mm Hg.  Attention was then directed to the left foot.    A #15 scalpel then utilized to perform a longitudinal incision centered over the dorsal left midfoot in a controlled depth managed through the skin down to the level of the subcutaneous  tissue/superficial fascia.  A combination sharp and blunt dissection was then utilized to incise the superficial fascia noting the dorsal medial cutaneous nerve.  The fascia was then released around the nerve mobilizing the dorsal medial cutaneous nerve which was also noted to be enlarged.  The fascia overlying the extensor hallucis brevis was also incised noting that a had a large muscle belly and tendon.  Metzenbaum scissors were then utilized to isolate the tendon and incise it.  The deep peroneal nerve was immediately noted below it and the small vessels feeding the nerve were noted to pink up.  The deep tissue was incised surrounding the nerve mobilizing and retracting it laterally.  A palpable bony prominence was noted immediately adjacent to the nerve on along its medial aspect.  A longitudinal periosteal incision was created in this area gently retracting the nerves away from the surgical field followed by subperiosteal dissection exposing the underlying exostosis.  A microsagittal saw was then utilized to resect the bony prominence and a rongeur was utilized to remove any sharp edges.  Copious irrigation was then performed the incision site.  The skin was then repaired with 5-0 Monocryl suture in a running subcuticular stitch.  Attention was then directed to the left forefoot for the next procedure.    A #15 scalpel then utilized to complete a dorsal longitudinal incision overlying the distal aspect of the 5th metatarsal lateral to the extensor digitorum longus tendon slip in a controlled depth managed through the skin down to level of the underlying subcutaneous tissue.  Combination of sharp and blunt dissection was then utilized to expose the deep fascia, capsule and periosteum.  A longitudinal periosteal incision was then created lateral to the tendon followed by subperiosteal dissection exposing the 5th metatarsal head, neck and shaft.  A 0.045 in K-wire was then placed along the lateral cortex of the  5th metatarsal neck at the apex of the deformity.  A microsagittal saw was then utilized to create a medially based oblique wedge osteotomy.  The bony wedge was then removed.  K-wire was removed and the lateral cortex was gently feathered until the osteotomy was closed.  The capital fragment was still noted to be slightly dorsiflex so it was gently manipulated and reduced in anatomical alignment followed by provisional fixation with a K-wire.  Fluoroscopic guidance was utilized to confirm adequate reduction of the malunited previous fracture.  A 4 hole dorsally based Arthrex locking plate was then provisionally secured.  Permanent fixation was then completed with 3 locking screws 1 placed in the capital fragment to in the shaft of the 5th metatarsal.  Demineralized bone matrix was then packed in between the bony fragments since there was some subtle dorsal gapping.  The provisional K-wire was then removed.  Attention was then directed to the 4th toe region.    Fourth metatarsal head was plantar flexed and very prominent upon palpation.  A dorsal longitudinal incision was then created over the dorsal 4 MTP with a #15 scalpel in a controlled depth managed through skin down to the level of the subcutaneous tissue.  Combination sharp and blunt dissection was utilized to expose the extensor digitorum longus tendon slip and extensor expansion.  A longitudinal incision was then utilized to split the extensor hallucis longus and brevis tendons down to the capsule.  The tendons were gently retracted and a longitudinal capsular incision was then created followed by subperiosteal dissection exposing the 4th metatarsal head.  A dorsally based wedge maintaining a plantar hinge was then created at the dorsal aspect of the 4th metatarsal head.  The bone wedge was removed and the 4th metatarsal head was gently dorsiflexed and rotated in a superior direction reducing the deformity.  Permanent fixation was then completed with a 0.045  in K-wire which was retrograded through the 4th metatarsal head out the skin.  The osteotomy was completely reduced and then the K-wire was antegrade into the metatarsal shaft.  Wire was then bent and cut and a Jurgan ball applied.     A Sequoyah blade was then utilized to complete a percutaneous flexor tenotomy of toes 2-4 left foot by creating a small percutaneous incision at the plantar aspect of the DIPJ of each toe and dorsiflexion each toe reducing the deformity.    4-0 Vicryl suture was then utilized to repair periosteum and capsule overlying the 5th metatarsal and deep tissue and subcutaneous tissue.  4-0 Vicryl suture was also utilized to repair the extensor expansion between the EDL and ED be tendon slips of the 4th toe as well as the subcutaneous tissue.  5-0 Monocryl suture was utilized to reapproximate the skin overlying the for MTP and 5th metatarsals in a subcuticular running stitch.  4-0 nylon suture was utilized to repair the small percutaneous incisions overlying the plantar toes 2-4.  Mastisol Steri-Strips were then applied overlying all the incision sites.  Xeroform was applied over all incisions followed by dry sterile bulky gauze dressing secured with cast padding and outer Ace wrap.    The left calf tourniquet was then deflated noting instant return of capillary fill time.      The patient tolerated procedure well without apparent complication.  Vital signs are stable throughout the surgery and vascular status remained intact to left lower extremity.  She was transferred from the OR to recovery.    Patient will be heel weight-bearing with orthopedic boot to left foot and crutches.  Dressing remained clean, dry intact until follow-up visit within 1 week.    A portion of this note was generated by voice recognition software and may contain spelling and grammar errors.

## 2020-11-06 NOTE — ANESTHESIA PROCEDURE NOTES
Peripheral Block    Patient location during procedure: pre-op    Reason for block: primary anesthetic   Diagnosis: L hammer toe   Start time: 11/6/2020 11:41 AM  Timeout: 11/6/2020 11:38 AM   End time: 11/6/2020 11:48 AM    Staffing  Authorizing Provider: Luis Fair MD  Performing Provider: Mervin Molina MD    Preanesthetic Checklist  Completed: patient identified, site marked, surgical consent, pre-op evaluation, timeout performed, IV checked, risks and benefits discussed and monitors and equipment checked  Peripheral Block  Patient position: supine  Prep: ChloraPrep  Patient monitoring: heart rate, cardiac monitor, continuous pulse ox and frequent blood pressure checks  Block type: adductor canal and popliteal  Laterality: left  Injection technique: single shot  Needle  Needle type: Stimuplex   Needle gauge: 21 G  Needle length: 4 in  Needle localization: anatomical landmarks and ultrasound guidance   -ultrasound image captured on disc.  Assessment  Injection assessment: negative aspiration, negative parasthesia and local visualized surrounding nerve  Paresthesia pain: none  Heart rate change: no  Slow fractionated injection: yes  Additional Notes  VSS.  [facility] RN monitoring vitals throughout procedure.  Patient tolerated procedure well.

## 2020-11-12 ENCOUNTER — OFFICE VISIT (OUTPATIENT)
Dept: PODIATRY | Facility: CLINIC | Age: 40
End: 2020-11-12
Payer: COMMERCIAL

## 2020-11-12 VITALS
HEART RATE: 90 BPM | WEIGHT: 216 LBS | BODY MASS INDEX: 31.99 KG/M2 | HEIGHT: 69 IN | DIASTOLIC BLOOD PRESSURE: 86 MMHG | SYSTOLIC BLOOD PRESSURE: 127 MMHG

## 2020-11-12 DIAGNOSIS — Z98.890 POSTOPERATIVE STATE: Primary | ICD-10-CM

## 2020-11-12 PROCEDURE — 1126F AMNT PAIN NOTED NONE PRSNT: CPT | Mod: S$GLB,,, | Performed by: PODIATRIST

## 2020-11-12 PROCEDURE — 3008F PR BODY MASS INDEX (BMI) DOCUMENTED: ICD-10-PCS | Mod: CPTII,S$GLB,, | Performed by: PODIATRIST

## 2020-11-12 PROCEDURE — 1126F PR PAIN SEVERITY QUANTIFIED, NO PAIN PRESENT: ICD-10-PCS | Mod: S$GLB,,, | Performed by: PODIATRIST

## 2020-11-12 PROCEDURE — 99999 PR PBB SHADOW E&M-EST. PATIENT-LVL IV: CPT | Mod: PBBFAC,,, | Performed by: PODIATRIST

## 2020-11-12 PROCEDURE — 99024 POSTOP FOLLOW-UP VISIT: CPT | Mod: S$GLB,,, | Performed by: PODIATRIST

## 2020-11-12 PROCEDURE — 99999 PR PBB SHADOW E&M-EST. PATIENT-LVL IV: ICD-10-PCS | Mod: PBBFAC,,, | Performed by: PODIATRIST

## 2020-11-12 PROCEDURE — 99024 PR POST-OP FOLLOW-UP VISIT: ICD-10-PCS | Mod: S$GLB,,, | Performed by: PODIATRIST

## 2020-11-12 PROCEDURE — 3008F BODY MASS INDEX DOCD: CPT | Mod: CPTII,S$GLB,, | Performed by: PODIATRIST

## 2020-11-12 NOTE — PROGRESS NOTES
"Subjective:      Patient ID: Alyssa Robledo is a 40 y.o. female.    Chief Complaint: Post-op Evaluation (1wk f/u)    Presents today complaining of persistent pain to the left foot after breaking her left 5th metatarsal at [employer] football game a year ago.  Relates that she gets pins and needles tingling to the top of the foot that radiates along the 1st and 2nd toes that is worsened when she flexes the foot down.  Also gets some moderate amount of pain underneath the ball of the left foot when she stands or walks for extended periods of time.  Pain is alleviated by rest.  She showed me an old x-ray which showed a 5th metatarsal oblique neck fracture that was significantly displaced on AP view.    10/01/2020:  Follow-up for left foot pain.  She has been wearing a silicone pad underneath the ball of foot however this seems to increase her pain.  She has been resorting to a Darco shoe and instead of tennis shoe.    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  Relates only discomfort when she applies weight on the foot.  She stopped taking pain medication 4 days ago.    Vitals:    20 1357   BP: 127/86   Pulse: 90   Weight: 98 kg (216 lb)   Height: 5' 9" (1.753 m)   PainSc: 0-No pain      Past Medical History:   Diagnosis Date    Anxiety     GERD (gastroesophageal reflux disease)        Past Surgical History:   Procedure Laterality Date     SECTION      2    CORRECTION OF HAMMER TOE Left 2020    Procedure: CORRECTION, HAMMER TOE;  Surgeon: Ha Goetz DPM;  Location: [facility];  Service: Podiatry;  Laterality: Left;  mini c-arm, Arthrex toe implant, toes 3,4Mallory confirmed 2020 0745 KB    DILATION AND CURETTAGE OF " UTERUS      OSTEOTOMY OF METATARSAL BONE Left 11/6/2020    Procedure: OSTEOTOMY, METATARSAL BONE;  Surgeon: Ha Goetz DPM;  Location: [facility];  Service: Podiatry;  Laterality: Left;  Arthrex screws, dorsal exostectomy foot/deep peroneal nerve release included (Cuca notified 10/26, EF)       Family History   Problem Relation Age of Onset    No Known Problems Mother     No Known Problems Father     Breast cancer Maternal Grandmother     Heart disease Neg Hx     Cancer Neg Hx        Social History     Socioeconomic History    Marital status:      Spouse name: Not on file    Number of children: Not on file    Years of education: Not on file    Highest education level: Not on file   Occupational History     Employer: [employer]   Social Needs    Financial resource strain: Not on file    Food insecurity     Worry: Not on file     Inability: Not on file    Transportation needs     Medical: Not on file     Non-medical: Not on file   Tobacco Use    Smoking status: Never Smoker    Smokeless tobacco: Never Used   Substance and Sexual Activity    Alcohol use: Yes     Alcohol/week: 0.8 standard drinks     Types: 1 Standard drinks or equivalent per week     Comment: 3     Drug use: No    Sexual activity: Yes     Partners: Male   Lifestyle    Physical activity     Days per week: Not on file     Minutes per session: Not on file    Stress: Not on file   Relationships    Social connections     Talks on phone: Not on file     Gets together: Not on file     Attends Quaker service: Not on file     Active member of club or organization: Not on file     Attends meetings of clubs or organizations: Not on file     Relationship status: Not on file   Other Topics Concern    Not on file   Social History Narrative    SOCIAL HISTORY: Live in [place] with  and 2 daughters. 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 Non-smoker. No exposure to blood; No risk for STD's. Socially  drinks alcohol;  is employed by [employer] as a .                      Current Outpatient Medications   Medication Sig Dispense Refill    azelastine (ASTELIN) 137 mcg (0.1 %) nasal spray 1 spray (137 mcg total) by Nasal route 2 (two) times daily. 30 mL 11    butalbital-acetaminophen-caffeine -40 mg (FIORICET, ESGIC) -40 mg per tablet Take 1 tablet by mouth every 4 (four) hours as needed for Pain.      celecoxib (CELEBREX) 200 MG capsule Take 1 capsule (200 mg total) by mouth 2 (two) times daily. 30 capsule 1    cephALEXin (KEFLEX) 500 MG capsule Take 1 capsule (500 mg total) by mouth 4 (four) times daily. 28 capsule 0    cetirizine (ZYRTEC) 10 MG tablet Take 10 mg by mouth once daily.      chlorthalidone (HYGROTEN) 25 MG Tab       fish oil-omega-3 fatty acids 300-1,000 mg capsule Take 2 capsules by mouth once daily.      FLUoxetine 40 MG capsule TAKE 1 CAPSULE BY MOUTH ONCE DAILY  90 capsule 4    hydroCHLOROthiazide (HYDRODIURIL) 25 MG tablet Take 25 mg by mouth once daily.      Lactobacillus acidophilus (PROBIOTIC) 10 billion cell Cap Take 1 tablet by mouth once daily.      OCELLA 3-0.03 mg per tablet        Current Facility-Administered Medications   Medication Dose Route Frequency Provider Last Rate Last Dose    sodium chloride 0.9% flush 10 mL  10 mL Intravenous PRN Ha Goetz, DPM           Review of patient's allergies indicates:   Allergen Reactions    Phenergan [promethazine] Hives         Review of Systems   Constitution: Negative for chills, fever and malaise/fatigue.   Cardiovascular: Negative for chest pain, claudication and leg swelling.   Respiratory: Negative for cough and shortness of breath.    Skin: Negative for color change and poor wound healing.   Musculoskeletal: Negative for back pain, joint pain, muscle cramps and muscle weakness.   Gastrointestinal: Negative for nausea and vomiting.   Neurological: Positive for paresthesias. Negative for numbness and  weakness.   Psychiatric/Behavioral: Negative for altered mental status.           Objective:      Physical Exam  Vitals signs reviewed.   Constitutional:       General: She is not in acute distress.     Appearance: Normal appearance. She is not ill-appearing.   Cardiovascular:      Pulses:           Dorsalis pedis pulses are 2+ on the right side and 2+ on the left side.        Posterior tibial pulses are 2+ on the right side and 2+ on the left side.   Musculoskeletal:      Comments: Mild localized pain on palpation surgical sites left foot.  Rectus appearance of toes 2-5 left foot.  Pin site to the plantar 4th metatarsal head region is intact with no signs infection.   Skin:     General: Skin is warm.      Capillary Refill: Capillary refill takes less than 2 seconds.      Findings: No ecchymosis or erythema.      Nails: There is no clubbing.        Comments: Incisions left foot well-approximated of absorbable suture.  Nylon suture to the plantar toes 2-4 left foot.  Mild superficial dehiscence with maceration of the skin at the distal aspect of the incision site overlying the distal 5th metatarsal with no localized signs infection.  There is no active drainage.   Neurological:      Mental Status: She is alert and oriented to person, place, and time.      Sensory: Sensation is intact.      Motor: Motor function is intact.               Assessment:       Encounter Diagnosis   Name Primary?    Postoperative state Yes         Plan:       Alyssa was seen today for post-op evaluation.    Diagnoses and all orders for this visit:    Postoperative state      I counseled the patient on her conditions, their implications and medical management.    Dressing left foot removed.  Left foot cleansed with Hibiclens scrub.  Applied Betadine over the incision sites followed by Aquacel border.  Nylon sutures to the plantar toes were removed.  Tubigrip F applied.  Home care instructions were reviewed beginning in 3 days.    Continue heel  weight-bearing as tolerated with the orthopedic boot and crutches.    Continue elevation at rest.    Continue medications as prescribed.    RTC 3 weeks or p.r.n. as discussed.  Will remove the percutaneous K-wire at this time.  A portion of this note was generated by voice recognition software and may contain spelling and grammar errors.      .

## 2020-11-12 NOTE — PATIENT INSTRUCTIONS
Today we discussed that he may remove the dressing in 3 days and take a shower.  Do not submerge the foot directly into water.  Running water over the foot is okay.  Apply Betadine over the incisions and cover with a large Band-Aid to protect the skin.  He may apply a sock over top.  Please be careful removing the sock in order to prevent disturbing the pin site.  We will remove the pin at the next follow-up visit within 3 weeks.

## 2020-11-16 ENCOUNTER — PATIENT MESSAGE (OUTPATIENT)
Dept: SURGERY | Facility: HOSPITAL | Age: 40
End: 2020-11-16

## 2020-11-24 ENCOUNTER — PATIENT MESSAGE (OUTPATIENT)
Dept: PODIATRY | Facility: CLINIC | Age: 40
End: 2020-11-24

## 2020-11-25 ENCOUNTER — PATIENT MESSAGE (OUTPATIENT)
Dept: PODIATRY | Facility: CLINIC | Age: 40
End: 2020-11-25

## 2020-12-07 ENCOUNTER — OFFICE VISIT (OUTPATIENT)
Dept: PODIATRY | Facility: CLINIC | Age: 40
End: 2020-12-07
Payer: COMMERCIAL

## 2020-12-07 ENCOUNTER — HOSPITAL ENCOUNTER (OUTPATIENT)
Dept: RADIOLOGY | Facility: HOSPITAL | Age: 40
Discharge: HOME | End: 2020-12-07
Attending: PODIATRIST
Payer: COMMERCIAL

## 2020-12-07 VITALS
HEART RATE: 85 BPM | WEIGHT: 216 LBS | DIASTOLIC BLOOD PRESSURE: 89 MMHG | HEIGHT: 69 IN | SYSTOLIC BLOOD PRESSURE: 138 MMHG | BODY MASS INDEX: 31.99 KG/M2

## 2020-12-07 DIAGNOSIS — Z98.890 POSTOPERATIVE STATE: ICD-10-CM

## 2020-12-07 DIAGNOSIS — Z98.890 POSTOPERATIVE STATE: Primary | ICD-10-CM

## 2020-12-07 PROCEDURE — 3008F BODY MASS INDEX DOCD: CPT | Mod: CPTII,S$GLB,, | Performed by: PODIATRIST

## 2020-12-07 PROCEDURE — 73630 XR FOOT COMPLETE 3 VIEW LEFT: ICD-10-PCS | Mod: 26,LT,, | Performed by: RADIOLOGY

## 2020-12-07 PROCEDURE — 3008F PR BODY MASS INDEX (BMI) DOCUMENTED: ICD-10-PCS | Mod: CPTII,S$GLB,, | Performed by: PODIATRIST

## 2020-12-07 PROCEDURE — 73630 X-RAY EXAM OF FOOT: CPT | Mod: 26,LT,, | Performed by: RADIOLOGY

## 2020-12-07 PROCEDURE — 73630 X-RAY EXAM OF FOOT: CPT | Mod: TC,PN,LT

## 2020-12-07 PROCEDURE — 99999 PR PBB SHADOW E&M-EST. PATIENT-LVL III: CPT | Mod: PBBFAC,,, | Performed by: PODIATRIST

## 2020-12-07 PROCEDURE — 99999 PR PBB SHADOW E&M-EST. PATIENT-LVL III: ICD-10-PCS | Mod: PBBFAC,,, | Performed by: PODIATRIST

## 2020-12-07 PROCEDURE — 1126F AMNT PAIN NOTED NONE PRSNT: CPT | Mod: S$GLB,,, | Performed by: PODIATRIST

## 2020-12-07 PROCEDURE — 1126F PR PAIN SEVERITY QUANTIFIED, NO PAIN PRESENT: ICD-10-PCS | Mod: S$GLB,,, | Performed by: PODIATRIST

## 2020-12-07 PROCEDURE — 99024 PR POST-OP FOLLOW-UP VISIT: ICD-10-PCS | Mod: S$GLB,,, | Performed by: PODIATRIST

## 2020-12-07 PROCEDURE — 99024 POSTOP FOLLOW-UP VISIT: CPT | Mod: S$GLB,,, | Performed by: PODIATRIST

## 2020-12-07 RX ORDER — HYDROGEN PEROXIDE 3 %
20 SOLUTION, NON-ORAL MISCELLANEOUS
COMMUNITY
Start: 2020-02-27

## 2020-12-07 NOTE — PROGRESS NOTES
"Subjective:      Patient ID: Alyssa Robledo is a 40 y.o. female.    Chief Complaint: Post-op Evaluation (Pin removal)    Presents today complaining of persistent pain to the left foot after breaking her left 5th metatarsal at [employer] football game a year ago.  Relates that she gets pins and needles tingling to the top of the foot that radiates along the 1st and 2nd toes that is worsened when she flexes the foot down.  Also gets some moderate amount of pain underneath the ball of the left foot when she stands or walks for extended periods of time.  Pain is alleviated by rest.  She showed me an old x-ray which showed a 5th metatarsal oblique neck fracture that was significantly displaced on AP view.    10/01/2020:  Follow-up for left foot pain.  She has been wearing a silicone pad underneath the ball of foot however this seems to increase her pain.  She has been resorting to a Darco shoe and instead of tennis shoe.    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  Relates only discomfort when she applies weight on the foot.  She stopped taking pain medication 4 days ago.    2020:  1 month postop.  Relates that the exposed K-wire is catching on things and is bothering her.  Otherwise she has no pain.  She is heel weight-bearing with crutches and a orthopedic boot.    Vitals:    20 1309   BP: 138/89   Pulse: 85   Weight: 98 kg (216 lb)   Height: 5' 9" (1.753 m)   PainSc: 0-No pain      Past Medical History:   Diagnosis Date    Anxiety     GERD (gastroesophageal reflux disease)        Past Surgical History:   Procedure Laterality Date     SECTION      2    CORRECTION OF HAMMER TOE Left 2020    Procedure: CORRECTION, HAMMER TOE;  " Surgeon: Ha Goetz DPM;  Location: [facility] OR;  Service: Podiatry;  Laterality: Left;  mini c-arm, Arthrex toe implant, toes 3,4Mallory confirmed 11/5/2020 0711 KB    DILATION AND CURETTAGE OF UTERUS      OSTEOTOMY OF METATARSAL BONE Left 11/6/2020    Procedure: OSTEOTOMY, METATARSAL BONE;  Surgeon: Ha Goetz DPM;  Location: [facility] OR;  Service: Podiatry;  Laterality: Left;  Arthrex screws, dorsal exostectomy foot/deep peroneal nerve release included (Cuca notified 10/26, EF)       Family History   Problem Relation Age of Onset    No Known Problems Mother     No Known Problems Father     Breast cancer Maternal Grandmother     Heart disease Neg Hx     Cancer Neg Hx        Social History     Socioeconomic History    Marital status:      Spouse name: Not on file    Number of children: Not on file    Years of education: Not on file    Highest education level: Not on file   Occupational History     Employer: [employer]   Social Needs    Financial resource strain: Not on file    Food insecurity     Worry: Not on file     Inability: Not on file    Transportation needs     Medical: Not on file     Non-medical: Not on file   Tobacco Use    Smoking status: Never Smoker    Smokeless tobacco: Never Used   Substance and Sexual Activity    Alcohol use: Yes     Alcohol/week: 0.8 standard drinks     Types: 1 Standard drinks or equivalent per week     Comment: 3     Drug use: No    Sexual activity: Yes     Partners: Male   Lifestyle    Physical activity     Days per week: Not on file     Minutes per session: Not on file    Stress: Not on file   Relationships    Social connections     Talks on phone: Not on file     Gets together: Not on file     Attends Holiness service: Not on file     Active member of club or organization: Not on file     Attends meetings of clubs or organizations: Not on file     Relationship status: Not on file   Other Topics Concern    Not on file    Social History Narrative    SOCIAL HISTORY: Live in [place] with  and 2 daughters. 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 Non-smoker. No exposure to blood; No risk for STD's. Socially drinks alcohol;  is employed by [employer] as a .                      Current Outpatient Medications   Medication Sig Dispense Refill    azelastine (ASTELIN) 137 mcg (0.1 %) nasal spray 1 spray (137 mcg total) by Nasal route 2 (two) times daily. 30 mL 11    butalbital-acetaminophen-caffeine -40 mg (FIORICET, ESGIC) -40 mg per tablet Take 1 tablet by mouth every 4 (four) hours as needed for Pain.      celecoxib (CELEBREX) 200 MG capsule Take 1 capsule (200 mg total) by mouth 2 (two) times daily. 30 capsule 1    cetirizine (ZYRTEC) 10 MG tablet Take 10 mg by mouth once daily.      chlorthalidone (HYGROTEN) 25 MG Tab       esomeprazole (NEXIUM) 20 MG capsule Take 20 mg by mouth.      fish oil-omega-3 fatty acids 300-1,000 mg capsule Take 2 capsules by mouth once daily.      FLUoxetine 40 MG capsule TAKE 1 CAPSULE BY MOUTH ONCE DAILY  90 capsule 4    hydroCHLOROthiazide (HYDRODIURIL) 25 MG tablet Take 25 mg by mouth once daily.      Lactobacillus acidophilus (PROBIOTIC) 10 billion cell Cap Take 1 tablet by mouth once daily.      OCELLA 3-0.03 mg per tablet        Current Facility-Administered Medications   Medication Dose Route Frequency Provider Last Rate Last Dose    sodium chloride 0.9% flush 10 mL  10 mL Intravenous PRN Ha Goetz DPM           Review of patient's allergies indicates:   Allergen Reactions    Phenergan [promethazine] Hives         Review of Systems   Constitution: Negative for chills, fever and malaise/fatigue.   HENT: Negative for congestion and hearing loss.    Cardiovascular: Negative for chest pain, claudication and leg swelling.   Respiratory: Negative for cough and shortness of breath.    Skin: Negative for color change and poor wound healing.    Musculoskeletal: Negative for back pain, joint pain, muscle cramps and muscle weakness.   Gastrointestinal: Negative for nausea and vomiting.   Neurological: Positive for paresthesias. Negative for numbness and weakness.   Psychiatric/Behavioral: Negative for altered mental status.           Objective:      Physical Exam  Vitals signs reviewed.   Constitutional:       General: She is not in acute distress.     Appearance: Normal appearance. She is not ill-appearing.   Cardiovascular:      Pulses:           Dorsalis pedis pulses are 2+ on the right side and 2+ on the left side.        Posterior tibial pulses are 2+ on the right side and 2+ on the left side.   Musculoskeletal:      Comments: With attempted plantar flexion of toes 3 and 4 left foot.  Toes 2-4 left foot are stiff.  Rectus appearance of toes 2-5 left foot.  Pin site to the plantar 4th metatarsal head region is intact however appears to be significantly loosened no localized signs infection.   Skin:     General: Skin is warm.      Capillary Refill: Capillary refill takes less than 2 seconds.      Findings: No ecchymosis or erythema.      Nails: There is no clubbing.        Comments: Incision sites to toes 2-5 left foot and the dorsum of the left foot are healed.     Neurological:      Mental Status: She is alert and oriented to person, place, and time.      Sensory: Sensation is intact.      Motor: Motor function is intact.               Assessment:       Encounter Diagnosis   Name Primary?    Postoperative state Yes         Plan:       Alyssa was seen today for post-op evaluation.    Diagnoses and all orders for this visit:    Postoperative state  -     X-Ray Foot Complete Left; Future  -     X-Ray Foot Complete Left; Future      I counseled the patient on her conditions, their implications and medical management.    K-wire removed from the plantar 4th metatarsal head left foot.  Was noted be significantly loosened however no signs infection.  Applied  Betadine and a Mepilex border.  Home care instructions reviewed in detail.    Progress to full weight-bearing however avoiding direct push-off on the ball of foot while using orthopedic boot.    Reviewed range of motion exercises focusing on plantar flexion to toes 2-4 left foot.  Will refer to physical therapy once the osteotomy sites are healed within 1 month.    Assisted per Dr. Parson DPM PGY 2      RTC 4 weeks or p.r.n. as discussed.  A portion of this note was generated by voice recognition software and may contain spelling and grammar errors.      .

## 2021-01-04 ENCOUNTER — HOSPITAL ENCOUNTER (OUTPATIENT)
Dept: RADIOLOGY | Facility: HOSPITAL | Age: 41
Discharge: HOME | End: 2021-01-04
Attending: PODIATRIST
Payer: COMMERCIAL

## 2021-01-04 ENCOUNTER — OFFICE VISIT (OUTPATIENT)
Dept: PODIATRY | Facility: CLINIC | Age: 41
End: 2021-01-04
Payer: COMMERCIAL

## 2021-01-04 VITALS
WEIGHT: 216 LBS | HEIGHT: 69 IN | BODY MASS INDEX: 31.99 KG/M2 | DIASTOLIC BLOOD PRESSURE: 81 MMHG | HEART RATE: 84 BPM | SYSTOLIC BLOOD PRESSURE: 127 MMHG

## 2021-01-04 DIAGNOSIS — Z98.890 POSTOPERATIVE STATE: ICD-10-CM

## 2021-01-04 DIAGNOSIS — Z98.890 POSTOPERATIVE STATE: Primary | ICD-10-CM

## 2021-01-04 DIAGNOSIS — M62.81 MUSCLE WEAKNESS OF LOWER EXTREMITY: ICD-10-CM

## 2021-01-04 PROCEDURE — 3008F BODY MASS INDEX DOCD: CPT | Mod: CPTII,S$GLB,, | Performed by: PODIATRIST

## 2021-01-04 PROCEDURE — 99999 PR PBB SHADOW E&M-EST. PATIENT-LVL IV: CPT | Mod: PBBFAC,,, | Performed by: PODIATRIST

## 2021-01-04 PROCEDURE — 3008F PR BODY MASS INDEX (BMI) DOCUMENTED: ICD-10-PCS | Mod: CPTII,S$GLB,, | Performed by: PODIATRIST

## 2021-01-04 PROCEDURE — 73630 X-RAY EXAM OF FOOT: CPT | Mod: TC,PN,LT

## 2021-01-04 PROCEDURE — 1125F PR PAIN SEVERITY QUANTIFIED, PAIN PRESENT: ICD-10-PCS | Mod: S$GLB,,, | Performed by: PODIATRIST

## 2021-01-04 PROCEDURE — 73630 XR FOOT COMPLETE 3 VIEW LEFT: ICD-10-PCS | Mod: 26,LT,, | Performed by: RADIOLOGY

## 2021-01-04 PROCEDURE — 99999 PR PBB SHADOW E&M-EST. PATIENT-LVL IV: ICD-10-PCS | Mod: PBBFAC,,, | Performed by: PODIATRIST

## 2021-01-04 PROCEDURE — 73630 X-RAY EXAM OF FOOT: CPT | Mod: 26,LT,, | Performed by: RADIOLOGY

## 2021-01-04 PROCEDURE — 99024 POSTOP FOLLOW-UP VISIT: CPT | Mod: S$GLB,,, | Performed by: PODIATRIST

## 2021-01-04 PROCEDURE — 1125F AMNT PAIN NOTED PAIN PRSNT: CPT | Mod: S$GLB,,, | Performed by: PODIATRIST

## 2021-01-04 PROCEDURE — 99024 PR POST-OP FOLLOW-UP VISIT: ICD-10-PCS | Mod: S$GLB,,, | Performed by: PODIATRIST

## 2021-01-05 PROBLEM — R26.9 ABNORMAL GAIT: Status: ACTIVE | Noted: 2021-01-05

## 2021-01-05 PROBLEM — R29.898 WEAKNESS OF FOOT, LEFT: Status: ACTIVE | Noted: 2021-01-05

## 2021-01-05 PROBLEM — M25.673 DECREASED ROM OF ANKLE: Status: ACTIVE | Noted: 2021-01-05

## 2021-01-15 ENCOUNTER — PATIENT MESSAGE (OUTPATIENT)
Dept: FAMILY MEDICINE | Facility: CLINIC | Age: 41
End: 2021-01-15

## 2021-01-15 RX ORDER — CHLORTHALIDONE 25 MG/1
25 TABLET ORAL DAILY
Qty: 30 TABLET | Refills: 3 | Status: SHIPPED | OUTPATIENT
Start: 2021-01-15 | End: 2021-01-18 | Stop reason: SDUPTHER

## 2021-01-16 ENCOUNTER — PATIENT MESSAGE (OUTPATIENT)
Dept: FAMILY MEDICINE | Facility: CLINIC | Age: 41
End: 2021-01-16

## 2021-01-16 DIAGNOSIS — I10 ESSENTIAL HYPERTENSION: Primary | ICD-10-CM

## 2021-01-18 RX ORDER — CHLORTHALIDONE 25 MG/1
25 TABLET ORAL DAILY
Qty: 90 TABLET | Refills: 2 | Status: SHIPPED | OUTPATIENT
Start: 2021-01-18 | End: 2021-05-04 | Stop reason: SDUPTHER

## 2021-02-01 ENCOUNTER — OFFICE VISIT (OUTPATIENT)
Dept: PODIATRY | Facility: CLINIC | Age: 41
End: 2021-02-01
Payer: COMMERCIAL

## 2021-02-01 VITALS — HEART RATE: 81 BPM | SYSTOLIC BLOOD PRESSURE: 143 MMHG | DIASTOLIC BLOOD PRESSURE: 91 MMHG

## 2021-02-01 DIAGNOSIS — G89.18 POSTOPERATIVE PAIN: Primary | ICD-10-CM

## 2021-02-01 DIAGNOSIS — G57.92 NEURITIS OF FOOT, LEFT: ICD-10-CM

## 2021-02-01 PROCEDURE — 1125F AMNT PAIN NOTED PAIN PRSNT: CPT | Mod: S$GLB,,, | Performed by: PODIATRIST

## 2021-02-01 PROCEDURE — 99024 PR POST-OP FOLLOW-UP VISIT: ICD-10-PCS | Mod: S$GLB,,, | Performed by: PODIATRIST

## 2021-02-01 PROCEDURE — 99024 POSTOP FOLLOW-UP VISIT: CPT | Mod: S$GLB,,, | Performed by: PODIATRIST

## 2021-02-01 PROCEDURE — 99999 PR PBB SHADOW E&M-EST. PATIENT-LVL III: ICD-10-PCS | Mod: PBBFAC,,, | Performed by: PODIATRIST

## 2021-02-01 PROCEDURE — 1125F PR PAIN SEVERITY QUANTIFIED, PAIN PRESENT: ICD-10-PCS | Mod: S$GLB,,, | Performed by: PODIATRIST

## 2021-02-01 PROCEDURE — 99999 PR PBB SHADOW E&M-EST. PATIENT-LVL III: CPT | Mod: PBBFAC,,, | Performed by: PODIATRIST

## 2021-02-01 RX ORDER — GABAPENTIN 100 MG/1
100 CAPSULE ORAL 3 TIMES DAILY
Qty: 90 CAPSULE | Refills: 5 | Status: SHIPPED | OUTPATIENT
Start: 2021-02-01 | End: 2021-12-21

## 2021-03-04 ENCOUNTER — PATIENT OUTREACH (OUTPATIENT)
Dept: ADMINISTRATIVE | Facility: OTHER | Age: 41
End: 2021-03-04

## 2021-03-08 ENCOUNTER — OFFICE VISIT (OUTPATIENT)
Dept: PODIATRY | Facility: CLINIC | Age: 41
End: 2021-03-08
Payer: COMMERCIAL

## 2021-03-08 VITALS
HEIGHT: 69 IN | BODY MASS INDEX: 31.99 KG/M2 | WEIGHT: 216 LBS | SYSTOLIC BLOOD PRESSURE: 149 MMHG | DIASTOLIC BLOOD PRESSURE: 88 MMHG | HEART RATE: 76 BPM

## 2021-03-08 DIAGNOSIS — G57.92 NEURITIS OF LEFT FOOT: Primary | ICD-10-CM

## 2021-03-08 PROCEDURE — 99999 PR PBB SHADOW E&M-EST. PATIENT-LVL III: CPT | Mod: PBBFAC,,, | Performed by: PODIATRIST

## 2021-03-08 PROCEDURE — 99499 UNLISTED E&M SERVICE: CPT | Mod: S$GLB,,, | Performed by: PODIATRIST

## 2021-03-08 PROCEDURE — 1125F PR PAIN SEVERITY QUANTIFIED, PAIN PRESENT: ICD-10-PCS | Mod: S$GLB,,, | Performed by: PODIATRIST

## 2021-03-08 PROCEDURE — 64450 PR NERVE BLOCK INJ, ANES/STEROID, OTHER PERIPHERAL: ICD-10-PCS | Mod: LT,S$GLB,, | Performed by: PODIATRIST

## 2021-03-08 PROCEDURE — 1125F AMNT PAIN NOTED PAIN PRSNT: CPT | Mod: S$GLB,,, | Performed by: PODIATRIST

## 2021-03-08 PROCEDURE — 64450 NJX AA&/STRD OTHER PN/BRANCH: CPT | Mod: LT,S$GLB,, | Performed by: PODIATRIST

## 2021-03-08 PROCEDURE — 3008F PR BODY MASS INDEX (BMI) DOCUMENTED: ICD-10-PCS | Mod: CPTII,S$GLB,, | Performed by: PODIATRIST

## 2021-03-08 PROCEDURE — 3008F BODY MASS INDEX DOCD: CPT | Mod: CPTII,S$GLB,, | Performed by: PODIATRIST

## 2021-03-08 PROCEDURE — 99999 PR PBB SHADOW E&M-EST. PATIENT-LVL III: ICD-10-PCS | Mod: PBBFAC,,, | Performed by: PODIATRIST

## 2021-03-08 PROCEDURE — 99499 NO LOS: ICD-10-PCS | Mod: S$GLB,,, | Performed by: PODIATRIST

## 2021-03-08 RX ORDER — METHYLPREDNISOLONE ACETATE 40 MG/ML
40 INJECTION, SUSPENSION INTRA-ARTICULAR; INTRALESIONAL; INTRAMUSCULAR; SOFT TISSUE
Status: COMPLETED | OUTPATIENT
Start: 2021-03-08 | End: 2021-03-08

## 2021-03-08 RX ADMIN — METHYLPREDNISOLONE ACETATE 40 MG: 40 INJECTION, SUSPENSION INTRA-ARTICULAR; INTRALESIONAL; INTRAMUSCULAR; SOFT TISSUE at 08:03

## 2021-05-03 ENCOUNTER — PATIENT MESSAGE (OUTPATIENT)
Dept: FAMILY MEDICINE | Facility: CLINIC | Age: 41
End: 2021-05-03

## 2021-05-03 DIAGNOSIS — I10 ESSENTIAL HYPERTENSION: Primary | ICD-10-CM

## 2021-05-04 ENCOUNTER — PATIENT MESSAGE (OUTPATIENT)
Dept: FAMILY MEDICINE | Facility: CLINIC | Age: 41
End: 2021-05-04

## 2021-05-04 DIAGNOSIS — I10 ESSENTIAL HYPERTENSION: ICD-10-CM

## 2021-05-04 RX ORDER — CHLORTHALIDONE 50 MG/1
50 TABLET ORAL DAILY
Qty: 90 TABLET | Refills: 0 | Status: SHIPPED | OUTPATIENT
Start: 2021-05-04 | End: 2021-08-02

## 2021-05-27 DIAGNOSIS — Z71.84 TRAVEL ADVICE ENCOUNTER: Primary | ICD-10-CM

## 2021-05-28 ENCOUNTER — LAB VISIT (OUTPATIENT)
Dept: FAMILY MEDICINE | Facility: CLINIC | Age: 41
End: 2021-05-28
Payer: COMMERCIAL

## 2021-05-28 DIAGNOSIS — Z71.84 TRAVEL ADVICE ENCOUNTER: ICD-10-CM

## 2021-05-28 PROCEDURE — U0003 INFECTIOUS AGENT DETECTION BY NUCLEIC ACID (DNA OR RNA); SEVERE ACUTE RESPIRATORY SYNDROME CORONAVIRUS 2 (SARS-COV-2) (CORONAVIRUS DISEASE [COVID-19]), AMPLIFIED PROBE TECHNIQUE, MAKING USE OF HIGH THROUGHPUT TECHNOLOGIES AS DESCRIBED BY CMS-2020-01-R: HCPCS | Performed by: FAMILY MEDICINE

## 2021-05-28 PROCEDURE — U0005 INFEC AGEN DETEC AMPLI PROBE: HCPCS | Performed by: FAMILY MEDICINE

## 2021-05-29 LAB — SARS-COV-2 RNA RESP QL NAA+PROBE: NOT DETECTED

## 2021-06-07 PROBLEM — Z01.818 PREOPERATIVE EXAMINATION: Status: RESOLVED | Noted: 2020-10-29 | Resolved: 2021-06-07

## 2021-06-08 ENCOUNTER — PATIENT MESSAGE (OUTPATIENT)
Dept: FAMILY MEDICINE | Facility: CLINIC | Age: 41
End: 2021-06-08

## 2021-06-08 ENCOUNTER — OFFICE VISIT (OUTPATIENT)
Dept: FAMILY MEDICINE | Facility: CLINIC | Age: 41
End: 2021-06-08
Payer: COMMERCIAL

## 2021-06-08 VITALS
DIASTOLIC BLOOD PRESSURE: 88 MMHG | HEIGHT: 69 IN | WEIGHT: 214.38 LBS | HEART RATE: 89 BPM | BODY MASS INDEX: 31.75 KG/M2 | OXYGEN SATURATION: 98 % | RESPIRATION RATE: 16 BRPM | SYSTOLIC BLOOD PRESSURE: 122 MMHG

## 2021-06-08 DIAGNOSIS — F41.1 GENERALIZED ANXIETY DISORDER: Primary | ICD-10-CM

## 2021-06-08 DIAGNOSIS — E87.6 HYPOKALEMIA: ICD-10-CM

## 2021-06-08 DIAGNOSIS — E87.6 HYPOKALEMIA: Primary | ICD-10-CM

## 2021-06-08 DIAGNOSIS — F33.42 RECURRENT MAJOR DEPRESSIVE DISORDER, IN FULL REMISSION: ICD-10-CM

## 2021-06-08 DIAGNOSIS — Z11.4 SCREENING FOR HIV (HUMAN IMMUNODEFICIENCY VIRUS): ICD-10-CM

## 2021-06-08 DIAGNOSIS — R73.01 IMPAIRED FASTING GLUCOSE: ICD-10-CM

## 2021-06-08 DIAGNOSIS — Z11.59 NEED FOR HEPATITIS C SCREENING TEST: ICD-10-CM

## 2021-06-08 DIAGNOSIS — I10 ESSENTIAL HYPERTENSION: ICD-10-CM

## 2021-06-08 PROCEDURE — 3008F BODY MASS INDEX DOCD: CPT | Mod: CPTII,S$GLB,, | Performed by: FAMILY MEDICINE

## 2021-06-08 PROCEDURE — 99214 PR OFFICE/OUTPT VISIT, EST, LEVL IV, 30-39 MIN: ICD-10-PCS | Mod: S$GLB,,, | Performed by: FAMILY MEDICINE

## 2021-06-08 PROCEDURE — 3008F PR BODY MASS INDEX (BMI) DOCUMENTED: ICD-10-PCS | Mod: CPTII,S$GLB,, | Performed by: FAMILY MEDICINE

## 2021-06-08 PROCEDURE — 1126F PR PAIN SEVERITY QUANTIFIED, NO PAIN PRESENT: ICD-10-PCS | Mod: S$GLB,,, | Performed by: FAMILY MEDICINE

## 2021-06-08 PROCEDURE — 1126F AMNT PAIN NOTED NONE PRSNT: CPT | Mod: S$GLB,,, | Performed by: FAMILY MEDICINE

## 2021-06-08 PROCEDURE — 99999 PR PBB SHADOW E&M-EST. PATIENT-LVL IV: CPT | Mod: PBBFAC,,, | Performed by: FAMILY MEDICINE

## 2021-06-08 PROCEDURE — 99999 PR PBB SHADOW E&M-EST. PATIENT-LVL IV: ICD-10-PCS | Mod: PBBFAC,,, | Performed by: FAMILY MEDICINE

## 2021-06-08 PROCEDURE — 99214 OFFICE O/P EST MOD 30 MIN: CPT | Mod: S$GLB,,, | Performed by: FAMILY MEDICINE

## 2021-06-08 RX ORDER — POTASSIUM CHLORIDE 20 MEQ/1
20 TABLET, EXTENDED RELEASE ORAL DAILY
Qty: 90 TABLET | Refills: 0 | Status: SHIPPED | OUTPATIENT
Start: 2021-06-08 | End: 2021-06-23 | Stop reason: SDUPTHER

## 2021-06-10 LAB — HUMAN PAPILLOMAVIRUS (HPV): NORMAL

## 2021-06-22 ENCOUNTER — PATIENT MESSAGE (OUTPATIENT)
Dept: FAMILY MEDICINE | Facility: CLINIC | Age: 41
End: 2021-06-22

## 2021-06-23 ENCOUNTER — PATIENT MESSAGE (OUTPATIENT)
Dept: FAMILY MEDICINE | Facility: CLINIC | Age: 41
End: 2021-06-23

## 2021-06-23 DIAGNOSIS — E87.6 HYPOKALEMIA: ICD-10-CM

## 2021-06-23 RX ORDER — POTASSIUM CHLORIDE 20 MEQ/1
20 TABLET, EXTENDED RELEASE ORAL 2 TIMES DAILY
Qty: 180 TABLET | Refills: 0 | Status: SHIPPED | OUTPATIENT
Start: 2021-06-23 | End: 2021-10-05 | Stop reason: SDUPTHER

## 2021-06-27 ENCOUNTER — PATIENT MESSAGE (OUTPATIENT)
Dept: FAMILY MEDICINE | Facility: CLINIC | Age: 41
End: 2021-06-27

## 2021-06-28 ENCOUNTER — PATIENT MESSAGE (OUTPATIENT)
Dept: FAMILY MEDICINE | Facility: CLINIC | Age: 41
End: 2021-06-28

## 2021-07-31 DIAGNOSIS — I10 ESSENTIAL HYPERTENSION: ICD-10-CM

## 2021-08-02 RX ORDER — CHLORTHALIDONE 50 MG/1
TABLET ORAL
Qty: 90 TABLET | Refills: 0 | Status: SHIPPED | OUTPATIENT
Start: 2021-08-02 | End: 2021-10-26

## 2021-09-17 ENCOUNTER — PATIENT MESSAGE (OUTPATIENT)
Dept: FAMILY MEDICINE | Facility: CLINIC | Age: 41
End: 2021-09-17

## 2021-09-17 DIAGNOSIS — Z20.822 ENCOUNTER FOR LABORATORY TESTING FOR COVID-19 VIRUS: ICD-10-CM

## 2021-10-05 ENCOUNTER — PATIENT MESSAGE (OUTPATIENT)
Dept: FAMILY MEDICINE | Facility: CLINIC | Age: 41
End: 2021-10-05

## 2021-10-05 DIAGNOSIS — E87.6 HYPOKALEMIA: ICD-10-CM

## 2021-10-05 RX ORDER — POTASSIUM CHLORIDE 20 MEQ/1
20 TABLET, EXTENDED RELEASE ORAL 2 TIMES DAILY
Qty: 180 TABLET | Refills: 1 | Status: SHIPPED | OUTPATIENT
Start: 2021-10-05 | End: 2022-05-21

## 2021-12-01 DIAGNOSIS — Z12.31 OTHER SCREENING MAMMOGRAM: ICD-10-CM

## 2021-12-21 ENCOUNTER — OFFICE VISIT (OUTPATIENT)
Dept: FAMILY MEDICINE | Facility: CLINIC | Age: 41
End: 2021-12-21
Payer: COMMERCIAL

## 2021-12-21 VITALS
HEIGHT: 69 IN | DIASTOLIC BLOOD PRESSURE: 84 MMHG | OXYGEN SATURATION: 98 % | SYSTOLIC BLOOD PRESSURE: 122 MMHG | HEART RATE: 77 BPM | RESPIRATION RATE: 18 BRPM | WEIGHT: 207.63 LBS | BODY MASS INDEX: 30.75 KG/M2

## 2021-12-21 DIAGNOSIS — F33.42 RECURRENT MAJOR DEPRESSIVE DISORDER, IN FULL REMISSION: ICD-10-CM

## 2021-12-21 DIAGNOSIS — Z00.01 ENCOUNTER FOR GENERAL ADULT MEDICAL EXAMINATION WITH ABNORMAL FINDINGS: ICD-10-CM

## 2021-12-21 DIAGNOSIS — E87.6 HYPOKALEMIA: ICD-10-CM

## 2021-12-21 DIAGNOSIS — F41.1 GENERALIZED ANXIETY DISORDER: Primary | ICD-10-CM

## 2021-12-21 DIAGNOSIS — I10 ESSENTIAL HYPERTENSION: ICD-10-CM

## 2021-12-21 DIAGNOSIS — R73.01 IMPAIRED FASTING GLUCOSE: ICD-10-CM

## 2021-12-21 PROCEDURE — 90686 IIV4 VACC NO PRSV 0.5 ML IM: CPT | Mod: S$GLB,,, | Performed by: FAMILY MEDICINE

## 2021-12-21 PROCEDURE — 90471 IMMUNIZATION ADMIN: CPT | Mod: S$GLB,,, | Performed by: FAMILY MEDICINE

## 2021-12-21 PROCEDURE — 99214 PR OFFICE/OUTPT VISIT, EST, LEVL IV, 30-39 MIN: ICD-10-PCS | Mod: 25,S$GLB,, | Performed by: FAMILY MEDICINE

## 2021-12-21 PROCEDURE — 90471 FLU VACCINE (QUAD) GREATER THAN OR EQUAL TO 3YO PRESERVATIVE FREE IM: ICD-10-PCS | Mod: S$GLB,,, | Performed by: FAMILY MEDICINE

## 2021-12-21 PROCEDURE — 99214 OFFICE O/P EST MOD 30 MIN: CPT | Mod: 25,S$GLB,, | Performed by: FAMILY MEDICINE

## 2021-12-21 PROCEDURE — 99999 PR PBB SHADOW E&M-EST. PATIENT-LVL V: CPT | Mod: PBBFAC,,, | Performed by: FAMILY MEDICINE

## 2021-12-21 PROCEDURE — 99999 PR PBB SHADOW E&M-EST. PATIENT-LVL V: ICD-10-PCS | Mod: PBBFAC,,, | Performed by: FAMILY MEDICINE

## 2021-12-21 PROCEDURE — 90686 FLU VACCINE (QUAD) GREATER THAN OR EQUAL TO 3YO PRESERVATIVE FREE IM: ICD-10-PCS | Mod: S$GLB,,, | Performed by: FAMILY MEDICINE

## 2021-12-22 ENCOUNTER — PATIENT OUTREACH (OUTPATIENT)
Dept: ADMINISTRATIVE | Facility: HOSPITAL | Age: 41
End: 2021-12-22
Payer: COMMERCIAL

## 2021-12-28 ENCOUNTER — PATIENT MESSAGE (OUTPATIENT)
Dept: FAMILY MEDICINE | Facility: CLINIC | Age: 41
End: 2021-12-28
Payer: COMMERCIAL

## 2021-12-28 ENCOUNTER — PATIENT OUTREACH (OUTPATIENT)
Dept: ADMINISTRATIVE | Facility: HOSPITAL | Age: 41
End: 2021-12-28
Payer: COMMERCIAL

## 2021-12-28 DIAGNOSIS — J01.00 ACUTE NON-RECURRENT MAXILLARY SINUSITIS: Primary | ICD-10-CM

## 2021-12-28 RX ORDER — AMOXICILLIN AND CLAVULANATE POTASSIUM 875; 125 MG/1; MG/1
1 TABLET, FILM COATED ORAL EVERY 12 HOURS
Qty: 14 TABLET | Refills: 0 | Status: SHIPPED | OUTPATIENT
Start: 2021-12-28 | End: 2022-01-04

## 2022-01-05 ENCOUNTER — PATIENT MESSAGE (OUTPATIENT)
Dept: FAMILY MEDICINE | Facility: CLINIC | Age: 42
End: 2022-01-05
Payer: COMMERCIAL

## 2022-01-05 RX ORDER — FLUCONAZOLE 150 MG/1
150 TABLET ORAL DAILY
Qty: 1 TABLET | Refills: 0 | Status: SHIPPED | OUTPATIENT
Start: 2022-01-05 | End: 2022-01-06

## 2022-01-20 ENCOUNTER — PATIENT MESSAGE (OUTPATIENT)
Dept: PODIATRY | Facility: CLINIC | Age: 42
End: 2022-01-20

## 2022-01-20 ENCOUNTER — OFFICE VISIT (OUTPATIENT)
Dept: PODIATRY | Facility: CLINIC | Age: 42
End: 2022-01-20
Payer: COMMERCIAL

## 2022-01-20 ENCOUNTER — HOSPITAL ENCOUNTER (OUTPATIENT)
Dept: RADIOLOGY | Facility: HOSPITAL | Age: 42
Discharge: HOME | End: 2022-01-20
Attending: PODIATRIST
Payer: COMMERCIAL

## 2022-01-20 VITALS
DIASTOLIC BLOOD PRESSURE: 93 MMHG | HEART RATE: 85 BPM | WEIGHT: 207.69 LBS | BODY MASS INDEX: 30.67 KG/M2 | SYSTOLIC BLOOD PRESSURE: 142 MMHG

## 2022-01-20 DIAGNOSIS — Z98.890 HISTORY OF FOOT SURGERY: ICD-10-CM

## 2022-01-20 DIAGNOSIS — M79.672 LEFT FOOT PAIN: Primary | ICD-10-CM

## 2022-01-20 DIAGNOSIS — M21.6X2 PLANTAR FLEXED METATARSAL BONE OF LEFT FOOT: ICD-10-CM

## 2022-01-20 DIAGNOSIS — M79.672 LEFT FOOT PAIN: ICD-10-CM

## 2022-01-20 PROCEDURE — 73630 X-RAY EXAM OF FOOT: CPT | Mod: TC,PN,LT

## 2022-01-20 PROCEDURE — 3008F BODY MASS INDEX DOCD: CPT | Mod: CPTII,S$GLB,, | Performed by: PODIATRIST

## 2022-01-20 PROCEDURE — 99213 PR OFFICE/OUTPT VISIT, EST, LEVL III, 20-29 MIN: ICD-10-PCS | Mod: S$GLB,,, | Performed by: PODIATRIST

## 2022-01-20 PROCEDURE — 1159F PR MEDICATION LIST DOCUMENTED IN MEDICAL RECORD: ICD-10-PCS | Mod: CPTII,S$GLB,, | Performed by: PODIATRIST

## 2022-01-20 PROCEDURE — 73630 XR FOOT COMPLETE 3 VIEW LEFT: ICD-10-PCS | Mod: 26,LT,, | Performed by: RADIOLOGY

## 2022-01-20 PROCEDURE — 73630 X-RAY EXAM OF FOOT: CPT | Mod: 26,LT,, | Performed by: RADIOLOGY

## 2022-01-20 PROCEDURE — 3077F SYST BP >= 140 MM HG: CPT | Mod: CPTII,S$GLB,, | Performed by: PODIATRIST

## 2022-01-20 PROCEDURE — 3080F PR MOST RECENT DIASTOLIC BLOOD PRESSURE >= 90 MM HG: ICD-10-PCS | Mod: CPTII,S$GLB,, | Performed by: PODIATRIST

## 2022-01-20 PROCEDURE — 1159F MED LIST DOCD IN RCRD: CPT | Mod: CPTII,S$GLB,, | Performed by: PODIATRIST

## 2022-01-20 PROCEDURE — 3080F DIAST BP >= 90 MM HG: CPT | Mod: CPTII,S$GLB,, | Performed by: PODIATRIST

## 2022-01-20 PROCEDURE — 1160F RVW MEDS BY RX/DR IN RCRD: CPT | Mod: CPTII,S$GLB,, | Performed by: PODIATRIST

## 2022-01-20 PROCEDURE — 1160F PR REVIEW ALL MEDS BY PRESCRIBER/CLIN PHARMACIST DOCUMENTED: ICD-10-PCS | Mod: CPTII,S$GLB,, | Performed by: PODIATRIST

## 2022-01-20 PROCEDURE — 3008F PR BODY MASS INDEX (BMI) DOCUMENTED: ICD-10-PCS | Mod: CPTII,S$GLB,, | Performed by: PODIATRIST

## 2022-01-20 PROCEDURE — 99213 OFFICE O/P EST LOW 20 MIN: CPT | Mod: S$GLB,,, | Performed by: PODIATRIST

## 2022-01-20 PROCEDURE — 99999 PR PBB SHADOW E&M-EST. PATIENT-LVL III: CPT | Mod: PBBFAC,,, | Performed by: PODIATRIST

## 2022-01-20 PROCEDURE — 3077F PR MOST RECENT SYSTOLIC BLOOD PRESSURE >= 140 MM HG: ICD-10-PCS | Mod: CPTII,S$GLB,, | Performed by: PODIATRIST

## 2022-01-20 PROCEDURE — 99999 PR PBB SHADOW E&M-EST. PATIENT-LVL III: ICD-10-PCS | Mod: PBBFAC,,, | Performed by: PODIATRIST

## 2022-01-20 NOTE — PROGRESS NOTES
Subjective:      Patient ID: Alyssa Robledo is a 41 y.o. female.    Chief Complaint: Foot Pain    Presents today complaining of persistent pain to the left foot after breaking her left 5th metatarsal at [employer] game a year ago.  Relates that she gets pins and needles tingling to the top of the foot that radiates along the 1st and 2nd toes that is worsened when she flexes the foot down.  Also gets some moderate amount of pain underneath the ball of the left foot when she stands or walks for extended periods of time.  Pain is alleviated by rest.  She showed me an old x-ray which showed a 5th metatarsal oblique neck fracture that was significantly displaced on AP view.    10/01/2020:  Follow-up for left foot pain.  She has been wearing a silicone pad underneath the ball of foot however this seems to increase her pain.  She has been resorting to a Darco shoe and instead of tennis shoe.    11/12/2020:  Post external neurolysis of the deep peroneal nerve and dorsal medial cutaneous nerves with extensor hallucis brevis tenotomy, percutaneous flexor tenotomies toes 3 and 4, rotational osteotomy the 4th metatarsal capital fragment and osteotomy of malunited 5th metatarsal fracture left foot on 11/06/2020.  Relates only discomfort when she applies weight on the foot.  She stopped taking pain medication 4 days ago.    12/07/2020:  1 month postop.  Relates that the exposed K-wire is catching on things and is bothering her.  Otherwise she has no pain.  She is heel weight-bearing with crutches and a orthopedic boot.    1/04/2021:  2 months postop.  Relates that she has mild pain however she is on her foot for extended periods of time it becomes moderate.  She still wearing the orthopedic boot.  Denies any slips, trips or falls.  Inquiring about return to work status.    2/1/2021: 11 weeks  post op above procedure. 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 Relates to discomfort on the top of her left foot, especially at night. States her bedsheet making contact with her left foot wakes her up in the middle of the night. Also relates to pain in the left MTPJs, new onset, states she was running after a student at work last week and pain started afterwards. Denies tripping, falling.    2021:  Follow-up for pain along the top of the left foot.  States that she is unable to take gabapentin during the day however has been taking at night.  States that makes her too tired.  Relates that she has some numbness along the top of the left foot that will radiate in between the 1st and 2nd toes as well proximally up the leg.  She does not have any pain along the bottom of the left foot while wearing shoes however when she walks barefoot she does have discomfort.    2022:  Complains of intermittent pain to left foot specially of weather changes.  Walking barefoot aggravates pain underneath the ball the foot.  She feels as though she can feel the retained hardware in the foot.  She works as a school nurse.  Pain mostly described as aching sensation.    Vitals:    22 0909   BP: (!) 142/93   Pulse: 85   Weight: 94.2 kg (207 lb 10.8 oz)   PainSc:   5      Past Medical History:   Diagnosis Date    Anxiety     GERD (gastroesophageal reflux disease)        Past Surgical History:   Procedure Laterality Date     SECTION      2    CORRECTION OF HAMMER TOE Left 2020    Procedure: CORRECTION, HAMMER TOE;  Surgeon: Ha Goetz DPM;  Location: [facility] OR;  Service: Podiatry;  Laterality: Left;  mini c-arm, Arthrex toe implant, toes 3,4Mallory confirmed 2020 0745 KB    DILATION AND CURETTAGE OF UTERUS      OSTEOTOMY OF METATARSAL BONE Left 2020    Procedure: OSTEOTOMY, METATARSAL BONE;  Surgeon: Ha Goetz DPM;  Location: [facility] OR;  Service: Podiatry;  Laterality: Left;  Arthrex screws, dorsal exostectomy  foot/deep peroneal nerve release included (Cuca notified 10/26, EF)       Family History   Problem Relation Age of Onset    No Known Problems Mother     No Known Problems Father     Breast cancer Maternal Grandmother     Heart disease Neg Hx     Cancer Neg Hx        Social History     Socioeconomic History    Marital status:    Occupational History     Employer: [employer]   Tobacco Use    Smoking status: Never Smoker    Smokeless tobacco: Never Used   Substance and Sexual Activity    Alcohol use: Yes     Alcohol/week: 0.8 standard drinks     Types: 1 Standard drinks or equivalent per week     Comment: 3     Drug use: No    Sexual activity: Yes     Partners: Male   Social History Narrative    Live in [place] with  and 2 daughters (2021 14 yo and 9yo). 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 Non-smoker. No exposure to blood; No risk for STD's. Socially drinks alcohol;  is employed by [employer] as a . 8/2021  and youngest daughter had covid-19 and did well.                      Current Outpatient Medications   Medication Sig Dispense Refill    butalbital-acetaminophen-caffeine -40 mg (FIORICET, ESGIC) -40 mg per tablet Take 1 tablet by mouth every 4 (four) hours as needed for Pain.      cetirizine (ZYRTEC) 10 MG tablet Take 10 mg by mouth once daily.      chlorthalidone (HYGROTEN) 50 MG Tab TAKE 1 TABLET BY MOUTH ONCE DAILY 90 tablet 0    esomeprazole (NEXIUM) 20 MG capsule Take 20 mg by mouth.      FLUoxetine 40 MG capsule TAKE 1 CAPSULE BY MOUTH ONCE DAILY 90 capsule 4    Lactobacillus acidophilus 10 billion cell Cap Take 1 tablet by mouth once daily.      OCELLA 3-0.03 mg per tablet       potassium chloride SA (K-DUR,KLOR-CON) 20 MEQ tablet Take 1 tablet (20 mEq total) by mouth 2 (two) times daily. 180 tablet 1    azelastine (ASTELIN) 137 mcg (0.1 %) nasal spray 1 spray (137 mcg total) by Nasal route 2 (two) times daily. 30 mL 11     fish oil-omega-3 fatty acids 300-1,000 mg capsule Take 2 capsules by mouth once daily.       Current Facility-Administered Medications   Medication Dose Route Frequency Provider Last Rate Last Admin    sodium chloride 0.9% flush 10 mL  10 mL Intravenous PRN Ha Goetz DPM           Review of patient's allergies indicates:   Allergen Reactions    Phenergan [promethazine] Hives         Review of Systems   Constitutional: Negative for chills, fever and malaise/fatigue.   HENT: Negative for congestion and hearing loss.    Cardiovascular: Negative for chest pain, claudication and leg swelling.   Respiratory: Negative for cough and shortness of breath.    Skin: Negative for color change and poor wound healing.   Musculoskeletal: Negative for back pain, joint pain, muscle cramps and muscle weakness.   Gastrointestinal: Negative for nausea and vomiting.   Neurological: Positive for paresthesias. Negative for numbness and weakness.   Psychiatric/Behavioral: Negative for altered mental status.           Objective:      Physical Exam  Vitals reviewed.   Constitutional:       General: She is not in acute distress.     Appearance: Normal appearance. She is not ill-appearing.   Cardiovascular:      Pulses:           Dorsalis pedis pulses are 2+ on the right side and 2+ on the left side.        Posterior tibial pulses are 2+ on the right side and 2+ on the left side.      Comments: Toes and feet are warm to touch proximal to distal b/l. There is normal hair growth distribution on the feet and toes b/l. There is no edema b/l. No spider veins or varicosities present b/l.     Musculoskeletal:      Comments: Prominent plantar 3rd and 4th metatarsal heads with mild overlying callus mostly over the 3rd metatarsal.  Mild localized pain on palpation to this area.  There is pain on palpation overlying the distal 1/2 of the 5th metatarsal shaft.  No pain 5th toe range of motion.  Reducible flexion contractures of toes 2-4 left  foot.    Cavus foot structure bilateral.  No pain on palpation to the dorsal left midfoot.   Skin:     General: Skin is warm.      Capillary Refill: Capillary refill takes less than 2 seconds.      Findings: No ecchymosis or erythema.      Nails: There is no clubbing.      Comments: Normal appearing scars to surgical sites left foot.     Neurological:      Mental Status: She is alert and oriented to person, place, and time.      Sensory: Sensation is intact.      Motor: Motor function is intact.      Comments: +tinel's sign to the deep peroneal nerve left foot                   Assessment:       Encounter Diagnoses   Name Primary?    Left foot pain Yes    Plantar flexed metatarsal bone of left foot     History of foot surgery          Plan:       Alyssa was seen today for foot pain.    Diagnoses and all orders for this visit:    Left foot pain  -     X-Ray Foot Complete Left; Future    Plantar flexed metatarsal bone of left foot  -     X-Ray Foot Complete Left; Future    History of foot surgery  -     X-Ray Foot Complete Left; Future      I counseled the patient on her conditions, their implications and medical management.    Discussed obtaining weight-bearing follow-up left foot x-ray to ensure that the 5th metatarsal is healed.  Last x-ray was taken approximately 2 months postop and shown delayed union of the 5th metatarsal osteotomy.  We briefly discussed hardware removal at a later date.    RTC within 4 weeks or p.r.n. as discussed.  Avoid any barefoot walking.  Encouraged to take NSAID therapy as needed.    A portion of this note was generated by voice recognition software and may contain spelling and grammar errors.        .

## 2022-01-25 ENCOUNTER — OFFICE VISIT (OUTPATIENT)
Dept: FAMILY MEDICINE | Facility: CLINIC | Age: 42
End: 2022-01-25
Payer: COMMERCIAL

## 2022-01-25 ENCOUNTER — PATIENT MESSAGE (OUTPATIENT)
Dept: FAMILY MEDICINE | Facility: CLINIC | Age: 42
End: 2022-01-25

## 2022-01-25 VITALS
HEART RATE: 93 BPM | BODY MASS INDEX: 30.61 KG/M2 | OXYGEN SATURATION: 98 % | HEIGHT: 69 IN | RESPIRATION RATE: 18 BRPM | DIASTOLIC BLOOD PRESSURE: 70 MMHG | SYSTOLIC BLOOD PRESSURE: 118 MMHG | WEIGHT: 206.69 LBS

## 2022-01-25 DIAGNOSIS — I10 ESSENTIAL HYPERTENSION: ICD-10-CM

## 2022-01-25 DIAGNOSIS — N76.0 ACUTE VAGINITIS: ICD-10-CM
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
## 2022-01-25 PROCEDURE — 3074F PR MOST RECENT SYSTOLIC BLOOD PRESSURE < 130 MM HG: ICD-10-PCS | Mod: CPTII,S$GLB,, | Performed by: FAMILY MEDICINE

## 2022-01-25 PROCEDURE — 1159F MED LIST DOCD IN RCRD: CPT | Mod: CPTII,S$GLB,, | Performed by: FAMILY MEDICINE

## 2022-01-25 PROCEDURE — 3078F DIAST BP <80 MM HG: CPT | Mod: CPTII,S$GLB,, | Performed by: FAMILY MEDICINE

## 2022-01-25 PROCEDURE — 99999 PR PBB SHADOW E&M-EST. PATIENT-LVL IV: ICD-10-PCS | Mod: PBBFAC,,, | Performed by: FAMILY MEDICINE

## 2022-01-25 PROCEDURE — 99999 PR PBB SHADOW E&M-EST. PATIENT-LVL IV: CPT | Mod: PBBFAC,,, | Performed by: FAMILY MEDICINE

## 2022-01-25 PROCEDURE — 1159F PR MEDICATION LIST DOCUMENTED IN MEDICAL RECORD: ICD-10-PCS | Mod: CPTII,S$GLB,, | Performed by: FAMILY MEDICINE

## 2022-01-25 PROCEDURE — 3008F BODY MASS INDEX DOCD: CPT | Mod: CPTII,S$GLB,, | Performed by: FAMILY MEDICINE

## 2022-01-25 PROCEDURE — 3008F PR BODY MASS INDEX (BMI) DOCUMENTED: ICD-10-PCS | Mod: CPTII,S$GLB,, | Performed by: FAMILY MEDICINE

## 2022-01-25 PROCEDURE — 99214 PR OFFICE/OUTPT VISIT, EST, LEVL IV, 30-39 MIN: ICD-10-PCS | Mod: S$GLB,,, | Performed by: FAMILY MEDICINE

## 2022-01-25 PROCEDURE — 3074F SYST BP LT 130 MM HG: CPT | Mod: CPTII,S$GLB,, | Performed by: FAMILY MEDICINE

## 2022-01-25 PROCEDURE — 99214 OFFICE O/P EST MOD 30 MIN: CPT | Mod: S$GLB,,, | Performed by: FAMILY MEDICINE

## 2022-01-25 PROCEDURE — 3078F PR MOST RECENT DIASTOLIC BLOOD PRESSURE < 80 MM HG: ICD-10-PCS | Mod: CPTII,S$GLB,, | Performed by: FAMILY MEDICINE

## 2022-01-25 RX ORDER — AMOXICILLIN AND CLAVULANATE POTASSIUM 875; 125 MG/1; MG/1
1 TABLET, FILM COATED ORAL EVERY 12 HOURS
Qty: 14 TABLET | Refills: 0 | Status: SHIPPED | OUTPATIENT
Start: 2022-01-25 | End: 2022-02-01

## 2022-01-25 RX ORDER — CHLORTHALIDONE 50 MG/1
50 TABLET ORAL DAILY
Qty: 90 TABLET | Refills: 3 | Status: SHIPPED | OUTPATIENT
Start: 2022-01-25 | End: 2022-12-21

## 2022-01-25 RX ORDER — FLUCONAZOLE 150 MG/1
150 TABLET ORAL DAILY
Qty: 1 TABLET | Refills: 0 | Status: SHIPPED | OUTPATIENT
Start: 2022-01-25 | End: 2022-01-26

## 2022-01-25 NOTE — PROGRESS NOTES
FAMILY MEDICINE  [facility]    Reason for visit:   Chief Complaint   Patient presents with    Animal Bite     Right pinkie finger        HPI: Alyssa Robledo is a 41 y.o. female  - with obesity, anxiety and hypertension present for dog bite    Gynecology Dr. Renata MCDONALD    1. Dog bit    Reports that her small dog has eye issues and she needs to place eye drops in his eyes daily. Reports that she was holding his eye open when he bit her. He is elderly and has not had issues with biting in the past and she suspects that he was in pain.     She reports that it was mild but this AM start to have pain and drainage and the redness which was just at the tip of her finger progressed to base of her finger    Onset: yesterday  Location: right 5th finger  Duration: 1 day and worsening this AM  Character: open superficial wound at dorsal DIP with drainage  Aggravating factors: movement   Relieving factors: nothing  Timing of day: all day  Associated symptoms: redness, pain , swelling   Negative symptoms: denies fever, chills, exposure of bone        Review of Systems   All other systems reviewed and are negative.      HISTORY:   Past Medical History:   Diagnosis Date    Anxiety     GERD (gastroesophageal reflux disease)        Past Surgical History:   Procedure Laterality Date     SECTION      2    CORRECTION OF HAMMER TOE Left 2020    Procedure: CORRECTION, HAMMER TOE;  Surgeon: Ha Goetz DPM;  Location: [facility] OR;  Service: Podiatry;  Laterality: Left;  mini c-arm, Arthrex toe implant, toes 3,4Mallory confirmed 2020 0745 KB    DILATION AND CURETTAGE OF UTERUS      OSTEOTOMY OF METATARSAL BONE Left 2020    Procedure: OSTEOTOMY, METATARSAL BONE;  Surgeon: Ha Goetz DPM;  Location: [facility] OR;  Service: Podiatry;  Laterality: Left;  Arthrex screws, dorsal exostectomy foot/deep peroneal nerve release included (Cuca notified 10/26, EF)       Family History   Problem Relation  Age of Onset    No Known Problems Mother     No Known Problems Father     Breast cancer Maternal Grandmother     Heart disease Neg Hx     Cancer Neg Hx        Social History     Tobacco Use    Smoking status: Never Smoker    Smokeless tobacco: Never Used   Substance Use Topics    Alcohol use: Yes     Alcohol/week: 0.8 standard drinks     Types: 1 Standard drinks or equivalent per week     Comment: 3     Drug use: No       Social History     Social History Narrative    Live in [place] with  and 2 daughters (2021 14 yo and 9yo). 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 Non-smoker. No exposure to blood; No risk for STD's. Socially drinks alcohol;  is employed by [employer] as a . 8/2021  and youngest daughter had covid-19 and did well.                      ALLERGIES:   Review of patient's allergies indicates:   Allergen Reactions    Phenergan [promethazine] Hives       MEDS:     Current Outpatient Medications:     azelastine (ASTELIN) 137 mcg (0.1 %) nasal spray, 1 spray (137 mcg total) by Nasal route 2 (two) times daily., Disp: 30 mL, Rfl: 11    butalbital-acetaminophen-caffeine -40 mg (FIORICET, ESGIC) -40 mg per tablet, Take 1 tablet by mouth every 4 (four) hours as needed for Pain., Disp: , Rfl:     cetirizine (ZYRTEC) 10 MG tablet, Take 10 mg by mouth once daily., Disp: , Rfl:     chlorthalidone (HYGROTEN) 50 MG Tab, Take 1 tablet (50 mg total) by mouth once daily., Disp: 90 tablet, Rfl: 3    esomeprazole (NEXIUM) 20 MG capsule, Take 20 mg by mouth., Disp: , Rfl:     FLUoxetine 40 MG capsule, TAKE 1 CAPSULE BY MOUTH ONCE DAILY, Disp: 90 capsule, Rfl: 4    Lactobacillus acidophilus 10 billion cell Cap, Take 1 tablet by mouth once daily., Disp: , Rfl:     OCELLA 3-0.03 mg per tablet, , Disp: , Rfl:     potassium chloride SA (K-DUR,KLOR-CON) 20 MEQ tablet, Take 1 tablet (20 mEq total) by mouth 2 (two) times daily., Disp: 180 tablet, Rfl: 1     "amoxicillin-clavulanate 875-125mg (AUGMENTIN) 875-125 mg per tablet, Take 1 tablet by mouth every 12 (twelve) hours. for 7 days, Disp: 14 tablet, Rfl: 0    fluconazole (DIFLUCAN) 150 MG Tab, Take 1 tablet (150 mg total) by mouth once daily. for 1 day, Disp: 1 tablet, Rfl: 0    Current Facility-Administered Medications:     sodium chloride 0.9% flush 10 mL, 10 mL, Intravenous, PRN, Ha Goetz DPM    Vital signs:   Vitals:    01/25/22 1408   BP: 118/70   BP Location: Left arm   Patient Position: Sitting   BP Method: X-Large (Manual)   Pulse: 93   Resp: 18   SpO2: 98%   Weight: 93.8 kg (206 lb 11.2 oz)   Height: 5' 9" (1.753 m)     Body mass index is 30.52 kg/m².    PHYSICAL EXAM:     Physical Exam  Constitutional:       General: She is not in acute distress.     Appearance: She is not toxic-appearing.   Musculoskeletal:        Hands:    Neurological:      Mental Status: She is alert.           PHQ4 = Score: 0    PERTINENT RESULTS:   BMP  Lab Results   Component Value Date     12/17/2021    K 3.5 12/17/2021    CL 99 12/17/2021    CO2 27 12/17/2021    BUN 15 12/17/2021    CREATININE 0.61 12/17/2021    CALCIUM 9.5 12/17/2021    ANIONGAP 15 12/17/2021    ESTGFRAFRICA >60.0 12/17/2021    EGFRNONAA >60.0 12/17/2021         ASSESSMENT/PLAN:  Problem List Items Addressed This Visit        Cardiac/Vascular    Essential hypertension    Overview     - well controlled  - continue current medication            Orthopedic    Dog bite - Primary    Overview     - increased redness and pain  - counseling on wound care  - counseling on OTC pain medications  - keep dry and covered  - rec Augmentin 875/125 mg BID x 7 days and notify me of any worsening or no improvement in 48 hours  - counseling regarding new medication including expected results, potential side effects, and appropriate use.  - questions elicited and answered  - encouraged to patient to notify me of any questions or concerns         Relevant Medications "    amoxicillin-clavulanate 875-125mg (AUGMENTIN) 875-125 mg per tablet      Other Visit Diagnoses     Acute vaginitis        Relevant Medications    fluconazole (DIFLUCAN) 150 MG Tab          ORDERS:   Orders Placed This Encounter    fluconazole (DIFLUCAN) 150 MG Tab    amoxicillin-clavulanate 875-125mg (AUGMENTIN) 875-125 mg per tablet       Vaccines recommended: covid-19    Follow-up in 1 week or as needed    This note is dictated using the M*Modal Flu[employer] Direct word recognition program. There are word recognition mistakes that are occasionally missed on review.    Dr. Alba Mayers D.O.   Family Medicine

## 2022-01-25 NOTE — TELEPHONE ENCOUNTER
No new care gaps identified.  Powered by [employer]. Reference number: [id number].   1/25/2022 6:57:09 AM CST

## 2022-01-25 NOTE — PATIENT INSTRUCTIONS
For pain:   1. Either Ibuprofen 600 mg every 6-8 hours or Aleve 440 mg every 12 hours or pain  2. Okay to add Acetaminophen 500 mg every 8 hours if needed    Wound Care:   1. Rinse with warm water  2. Antibacteria soap  3. Cover with bandaid    Send me an update in 2-3 days

## 2022-02-04 ENCOUNTER — PATIENT MESSAGE (OUTPATIENT)
Dept: PODIATRY | Facility: CLINIC | Age: 42
End: 2022-02-04
Payer: COMMERCIAL

## 2022-02-16 ENCOUNTER — PATIENT MESSAGE (OUTPATIENT)
Dept: PODIATRY | Facility: CLINIC | Age: 42
End: 2022-02-16
Payer: COMMERCIAL

## 2022-03-17 ENCOUNTER — TELEPHONE (OUTPATIENT)
Dept: FAMILY MEDICINE | Facility: CLINIC | Age: 42
End: 2022-03-17
Payer: COMMERCIAL

## 2022-03-17 ENCOUNTER — OFFICE VISIT (OUTPATIENT)
Dept: PODIATRY | Facility: CLINIC | Age: 42
End: 2022-03-17
Payer: COMMERCIAL

## 2022-03-17 VITALS
SYSTOLIC BLOOD PRESSURE: 145 MMHG | WEIGHT: 206 LBS | BODY MASS INDEX: 30.51 KG/M2 | DIASTOLIC BLOOD PRESSURE: 96 MMHG | HEART RATE: 94 BPM | HEIGHT: 69 IN

## 2022-03-17 DIAGNOSIS — M21.6X2 PLANTAR FLEXED METATARSAL BONE OF LEFT FOOT: ICD-10-CM

## 2022-03-17 DIAGNOSIS — L84 CORN OR CALLUS: ICD-10-CM

## 2022-03-17 DIAGNOSIS — T85.848D PAIN FROM IMPLANTED HARDWARE, SUBSEQUENT ENCOUNTER: Primary | ICD-10-CM

## 2022-03-17 DIAGNOSIS — Z01.818 PREOP TESTING: ICD-10-CM

## 2022-03-17 DIAGNOSIS — M79.672 LEFT FOOT PAIN: ICD-10-CM

## 2022-03-17 PROCEDURE — 99999 PR PBB SHADOW E&M-EST. PATIENT-LVL V: ICD-10-PCS | Mod: PBBFAC,,, | Performed by: PODIATRIST

## 2022-03-17 PROCEDURE — 3008F BODY MASS INDEX DOCD: CPT | Mod: CPTII,S$GLB,, | Performed by: PODIATRIST

## 2022-03-17 PROCEDURE — 99999 PR PBB SHADOW E&M-EST. PATIENT-LVL V: CPT | Mod: PBBFAC,,, | Performed by: PODIATRIST

## 2022-03-17 PROCEDURE — 3077F SYST BP >= 140 MM HG: CPT | Mod: CPTII,S$GLB,, | Performed by: PODIATRIST

## 2022-03-17 PROCEDURE — 1160F PR REVIEW ALL MEDS BY PRESCRIBER/CLIN PHARMACIST DOCUMENTED: ICD-10-PCS | Mod: CPTII,S$GLB,, | Performed by: PODIATRIST

## 2022-03-17 PROCEDURE — 1160F RVW MEDS BY RX/DR IN RCRD: CPT | Mod: CPTII,S$GLB,, | Performed by: PODIATRIST

## 2022-03-17 PROCEDURE — 3080F PR MOST RECENT DIASTOLIC BLOOD PRESSURE >= 90 MM HG: ICD-10-PCS | Mod: CPTII,S$GLB,, | Performed by: PODIATRIST

## 2022-03-17 PROCEDURE — 3008F PR BODY MASS INDEX (BMI) DOCUMENTED: ICD-10-PCS | Mod: CPTII,S$GLB,, | Performed by: PODIATRIST

## 2022-03-17 PROCEDURE — 3077F PR MOST RECENT SYSTOLIC BLOOD PRESSURE >= 140 MM HG: ICD-10-PCS | Mod: CPTII,S$GLB,, | Performed by: PODIATRIST

## 2022-03-17 PROCEDURE — 99214 PR OFFICE/OUTPT VISIT, EST, LEVL IV, 30-39 MIN: ICD-10-PCS | Mod: 57,S$GLB,, | Performed by: PODIATRIST

## 2022-03-17 PROCEDURE — 1159F MED LIST DOCD IN RCRD: CPT | Mod: CPTII,S$GLB,, | Performed by: PODIATRIST

## 2022-03-17 PROCEDURE — 3080F DIAST BP >= 90 MM HG: CPT | Mod: CPTII,S$GLB,, | Performed by: PODIATRIST

## 2022-03-17 PROCEDURE — 1159F PR MEDICATION LIST DOCUMENTED IN MEDICAL RECORD: ICD-10-PCS | Mod: CPTII,S$GLB,, | Performed by: PODIATRIST

## 2022-03-17 PROCEDURE — 99214 OFFICE O/P EST MOD 30 MIN: CPT | Mod: 57,S$GLB,, | Performed by: PODIATRIST

## 2022-03-17 RX ORDER — SODIUM CHLORIDE 0.9 % (FLUSH) 0.9 %
10 SYRINGE (ML) INJECTION
Status: DISCONTINUED | OUTPATIENT
Start: 2022-03-17 | End: 2023-12-08

## 2022-03-17 NOTE — PROGRESS NOTES
Subjective:      Patient ID: Alyssa Robledo is a 41 y.o. female.    Chief Complaint: Follow-up (1 month f/u left foot pain)    Presents today complaining of persistent pain to the left foot after breaking her left 5th metatarsal at [employer] football game a year ago.  Relates that she gets pins and needles tingling to the top of the foot that radiates along the 1st and 2nd toes that is worsened when she flexes the foot down.  Also gets some moderate amount of pain underneath the ball of the left foot when she stands or walks for extended periods of time.  Pain is alleviated by rest.  She showed me an old x-ray which showed a 5th metatarsal oblique neck fracture that was significantly displaced on AP view.    10/01/2020:  Follow-up for left foot pain.  She has been wearing a silicone pad underneath the ball of foot however this seems to increase her pain.  She has been resorting to a Darco shoe and instead of tennis shoe.    11/12/2020:  Post external neurolysis of the deep peroneal nerve and dorsal medial cutaneous nerves with extensor hallucis brevis tenotomy, percutaneous flexor tenotomies toes 3 and 4, rotational osteotomy the 4th metatarsal capital fragment and osteotomy of malunited 5th metatarsal fracture left foot on 11/06/2020.  Relates only discomfort when she applies weight on the foot.  She stopped taking pain medication 4 days ago.    12/07/2020:  1 month postop.  Relates that the exposed K-wire is catching on things and is bothering her.  Otherwise she has no pain.  She is heel weight-bearing with crutches and a orthopedic boot.    1/04/2021:  2 months postop.  Relates that she has mild pain however she is on her foot for extended periods of time it becomes moderate.  She still wearing the orthopedic boot.  Denies any slips, trips or falls.  Inquiring about return to work status.    2/1/2021: 11 weeks  post op above procedure. 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 Relates to discomfort on the top of her left foot, especially at night. States her bedsheet making contact with her left foot wakes her up in the middle of the night. Also relates to pain in the left MTPJs, new onset, states she was running after a student at work last week and pain started afterwards. Denies tripping, falling.    2021:  Follow-up for pain along the top of the left foot.  States that she is unable to take gabapentin during the day however has been taking at night.  States that makes her too tired.  Relates that she has some numbness along the top of the left foot that will radiate in between the 1st and 2nd toes as well proximally up the leg.  She does not have any pain along the bottom of the left foot while wearing shoes however when she walks barefoot she does have discomfort.    2022:  Complains of intermittent pain to left foot specially of weather changes.  Walking barefoot aggravates pain underneath the ball the foot.  She feels as though she can feel the retained hardware in the foot.  She works as a school nurse.  Pain mostly described as aching sensation.    2022: Returns complaining of painful retained hardware left 5th metatarsal and painful left plantar foot callus. She is requesting removal of left foot hardware this Summer. She is wearing new athletic shoes. She has attempted a metatarsal pad for the left foot before but could not tolerate it.     Vitals:    22 0849   BP: (!) 145/96   Pulse: 94   Weight: 93.4 kg (206 lb)   Height: 5' 9" (1.753 m)   PainSc:   5   PainLoc: Foot      Past Medical History:   Diagnosis Date    Anxiety     GERD (gastroesophageal reflux disease)        Past Surgical History:   Procedure Laterality Date     SECTION      2    CORRECTION OF HAMMER TOE Left 2020    Procedure: CORRECTION, HAMMER TOE;  Surgeon: Ha Goetz DPM;  Location: [facility] OR;  " Service: Podiatry;  Laterality: Left;  mini c-arm, Arthrex toe implant, toes 3,4Mallory confirmed 11/5/2020 0745 KB    DILATION AND CURETTAGE OF UTERUS      OSTEOTOMY OF METATARSAL BONE Left 11/6/2020    Procedure: OSTEOTOMY, METATARSAL BONE;  Surgeon: Ha Goetz DPM;  [facility];  Service: Podiatry;  Laterality: Left;  Arthrex screws, dorsal exostectomy foot/deep peroneal nerve release included (Cuca notified 10/26, EF)       Family History   Problem Relation Age of Onset    No Known Problems Mother     No Known Problems Father     Breast cancer Maternal Grandmother     Heart disease Neg Hx     Cancer Neg Hx        Social History     Socioeconomic History    Marital status:    Occupational History     Employer: [employer]   Tobacco Use    Smoking status: Never Smoker    Smokeless tobacco: Never Used   Substance and Sexual Activity    Alcohol use: Yes     Alcohol/week: 0.8 standard drinks     Types: 1 Standard drinks or equivalent per week     Comment: 3     Drug use: No    Sexual activity: Yes     Partners: Male   Social History Narrative    Live in [place] with  and 2 daughters (2021 12 yo and 9yo). 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 Non-smoker. No exposure to blood; No risk for STD's. Socially drinks alcohol;  is employed by [employer] as a . 8/2021  and youngest daughter had covid-19 and did well.                      Current Outpatient Medications   Medication Sig Dispense Refill    butalbital-acetaminophen-caffeine -40 mg (FIORICET, ESGIC) -40 mg per tablet Take 1 tablet by mouth every 4 (four) hours as needed for Pain.      cetirizine (ZYRTEC) 10 MG tablet Take 10 mg by mouth once daily.      chlorthalidone (HYGROTEN) 50 MG Tab Take 1 tablet (50 mg total) by mouth once daily. 90 tablet 3    esomeprazole (NEXIUM) 20 MG capsule Take 20 mg by mouth.      FLUoxetine 40 MG capsule TAKE 1 CAPSULE BY MOUTH ONCE DAILY  90 capsule 4    Lactobacillus acidophilus 10 billion cell Cap Take 1 tablet by mouth once daily.      OCELLA 3-0.03 mg per tablet       potassium chloride SA (K-DUR,KLOR-CON) 20 MEQ tablet Take 1 tablet (20 mEq total) by mouth 2 (two) times daily. 180 tablet 1    azelastine (ASTELIN) 137 mcg (0.1 %) nasal spray 1 spray (137 mcg total) by Nasal route 2 (two) times daily. 30 mL 11     Current Facility-Administered Medications   Medication Dose Route Frequency Provider Last Rate Last Admin    sodium chloride 0.9% flush 10 mL  10 mL Intravenous PRN Ha Goetz DPM        sodium chloride 0.9% flush 10 mL  10 mL Intravenous PRN Ha Goetz DPM           Review of patient's allergies indicates:   Allergen Reactions    Phenergan [promethazine] Hives         Review of Systems   Constitutional: Negative for chills, fever and malaise/fatigue.   HENT: Negative for congestion and hearing loss.    Cardiovascular: Negative for chest pain, claudication and leg swelling.   Respiratory: Negative for cough and shortness of breath.    Skin: Negative for color change and poor wound healing.        Callus left foot   Musculoskeletal: Positive for muscle cramps. Negative for back pain, joint pain and muscle weakness.   Gastrointestinal: Negative for nausea and vomiting.   Neurological: Positive for paresthesias. Negative for numbness and weakness.   Psychiatric/Behavioral: Negative for altered mental status.           Objective:      Physical Exam  Vitals reviewed.   Constitutional:       General: She is not in acute distress.     Appearance: Normal appearance. She is not ill-appearing.   Cardiovascular:      Pulses:           Dorsalis pedis pulses are 2+ on the right side and 2+ on the left side.        Posterior tibial pulses are 2+ on the right side and 2+ on the left side.      Comments: Toes and feet are warm to touch proximal to distal b/l. There is normal hair growth distribution on the feet and toes b/l.  There is no edema b/l. No spider veins or varicosities present b/l.     Musculoskeletal:      Comments: Prominent plantar 3rd and 4th metatarsal heads with mild overlying callus mostly over the 3rd metatarsal.  Mild localized pain on palpation to this area.  There is pain on palpation overlying the distal 1/2 of the 5th metatarsal shaft.  No pain 5th toe range of motion.  Reducible flexion contractures of toes 2-4 left foot.    Cavus foot structure bilateral.  No pain on palpation to the dorsal left midfoot.   Skin:     General: Skin is warm.      Capillary Refill: Capillary refill takes less than 2 seconds.      Findings: No ecchymosis or erythema.      Nails: There is no clubbing.      Comments: Normal appearing scars to surgical sites left foot.  Tender callus plantar left 3rd metatarsal head   Neurological:      Mental Status: She is alert and oriented to person, place, and time.      Sensory: Sensation is intact.      Motor: Motor function is intact.      Comments: +tinel's sign to the deep peroneal nerve left foot                   Assessment:       Encounter Diagnoses   Name Primary?    Pain from implanted hardware, subsequent encounter Yes    Left foot pain     Plantar flexed metatarsal bone of left foot     Corn or callus     Preop testing          Plan:       Alyssa was seen today for follow-up.    Diagnoses and all orders for this visit:    Pain from implanted hardware, subsequent encounter  -     Ambulatory referral/consult to Family Practice; Future  -     Case Request Operating Room: REMOVAL, HARDWARE, FOOT  -     Full code; Standing  -     Insert peripheral IV; Standing  -     Full code  -     Insert peripheral IV    Left foot pain    Plantar flexed metatarsal bone of left foot    Corn or callus    Preop testing  -     Ambulatory referral/consult to Family Practice; Future  -     CBC auto differential; Future  -     Basic Metabolic Panel; Future    Other orders  -     sodium chloride 0.9% flush 10  mL  The following orders have not been finalized:  -     ceFAZolin (ANCEF) 2 g in dextrose 5 % 50 mL IVPB      I counseled the patient on her conditions, their implications and medical management.    We discussed continued conservative care versus surgical intervention consisting hardware removal to the left 5th metatarsal.  It appears as though patient has Arthrex Mini fragment plate that will require removal.  We discussed risks, benefits anticipate postop course in detail.  No guarantees were given or implied.  Patient elected to proceed with surgical intervention outlined above.    This procedure has been fully reviewed with the patient and written informed consent has been obtained.    Referred to PCP for medical optimization and risk stratification    Scheduled for surgical intervention on 06/08/2022 as mac with local anesthesia.    Offloading metatarsal pad fabricated for the left 3rd plantar metatarsal.  As a courtesy a sterile #15 scalpel used to trim the raised hyperkeratotic skin inspect underlying skin which appeared normal.  She tolerated procedure well without apparent complication.    Inspected her shoes which appear to fit adequately and offer adequate support.  Also examined her feet noting at the left foot is slightly longer and wider compared to the right foot.  There also appears to be clinically a small lower limb length difference with the left side being longer than the right.    RTC within 1 week surgery or p.r.n. as discussed    Assisted by Best Pacheco DPM PGY 2    A portion of this note was generated by voice recognition software and may contain spelling and grammar errors.        .

## 2022-03-17 NOTE — TELEPHONE ENCOUNTER
----- Message from Dania Ricketts RN sent at 3/17/2022 10:20 AM CDT -----  Good morning,    Just wanted to give you all a heads up that Dr. Goetz with Podiatry is placing an ambulatory referral for a consult for medical clearance for upcoming foot surgery on 6/8/22. We appreciate your help.    Thank you,    Dania

## 2022-03-18 ENCOUNTER — TELEPHONE (OUTPATIENT)
Dept: FAMILY MEDICINE | Facility: CLINIC | Age: 42
End: 2022-03-18
Payer: COMMERCIAL

## 2022-03-18 NOTE — TELEPHONE ENCOUNTER
----- Message from Dania Ricketts RN sent at 3/17/2022  6:14 PM CDT -----  See attached note with ambulatory referral/consult  to Family Practice. Tentative surgery date 6/8/22 with Dr. Goetz

## 2022-04-20 ENCOUNTER — PATIENT MESSAGE (OUTPATIENT)
Dept: FAMILY MEDICINE | Facility: CLINIC | Age: 42
End: 2022-04-20
Payer: COMMERCIAL

## 2022-04-27 ENCOUNTER — TELEPHONE (OUTPATIENT)
Dept: PODIATRY | Facility: CLINIC | Age: 42
End: 2022-04-27
Payer: COMMERCIAL

## 2022-04-27 NOTE — TELEPHONE ENCOUNTER
Spoke with Ms Robledo to confirm if she had received her COVID vaccines. Per Ms Robledo she has not been vaccinated but will be getting a COVID test scheduled through her PCP's office prior to her procedure. She confirmed that she does have an appt slated with Dr. Mayers on 5/24/22. No other needs voiced at this time. Encouraged to call if further assistance is needed.

## 2022-05-21 DIAGNOSIS — E87.6 HYPOKALEMIA: ICD-10-CM

## 2022-05-21 RX ORDER — POTASSIUM CHLORIDE 20 MEQ/1
TABLET, EXTENDED RELEASE ORAL
Qty: 180 TABLET | Refills: 2 | Status: SHIPPED | OUTPATIENT
Start: 2022-05-21 | End: 2022-12-21

## 2022-05-21 NOTE — TELEPHONE ENCOUNTER
No new care gaps identified.  [facility] Embedded Care Gaps. Reference number: [id number]. 5/21/2022   11:08:25 AM CDT

## 2022-05-21 NOTE — TELEPHONE ENCOUNTER
Refill Authorization Note   Alyssa Robledo  is requesting a refill authorization.  Brief Assessment and Rationale for Refill:  Approve     Medication Therapy Plan:       Medication Reconciliation Completed: No   Comments:     No Care Gaps recommended.     Note composed:5:24 PM 05/21/2022

## 2022-05-24 ENCOUNTER — OFFICE VISIT (OUTPATIENT)
Dept: FAMILY MEDICINE | Facility: CLINIC | Age: 42
End: 2022-05-24
Payer: COMMERCIAL

## 2022-05-24 ENCOUNTER — PATIENT MESSAGE (OUTPATIENT)
Dept: SURGERY | Facility: HOSPITAL | Age: 42
End: 2022-05-24
Payer: COMMERCIAL

## 2022-05-24 ENCOUNTER — TELEPHONE (OUTPATIENT)
Dept: PODIATRY | Facility: CLINIC | Age: 42
End: 2022-05-24
Payer: COMMERCIAL

## 2022-05-24 ENCOUNTER — PATIENT MESSAGE (OUTPATIENT)
Dept: FAMILY MEDICINE | Facility: CLINIC | Age: 42
End: 2022-05-24
Payer: COMMERCIAL

## 2022-05-24 VITALS
OXYGEN SATURATION: 98 % | SYSTOLIC BLOOD PRESSURE: 132 MMHG | HEART RATE: 79 BPM | WEIGHT: 205 LBS | BODY MASS INDEX: 30.36 KG/M2 | HEIGHT: 69 IN | DIASTOLIC BLOOD PRESSURE: 76 MMHG

## 2022-05-24 DIAGNOSIS — Z01.818 PREOPERATIVE EXAMINATION: Primary | ICD-10-CM

## 2022-05-24 DIAGNOSIS — E87.6 HYPOKALEMIA: ICD-10-CM

## 2022-05-24 DIAGNOSIS — I10 ESSENTIAL HYPERTENSION: ICD-10-CM

## 2022-05-24 DIAGNOSIS — Z01.818 PREOP TESTING: Primary | ICD-10-CM

## 2022-05-24 DIAGNOSIS — R73.01 IMPAIRED FASTING GLUCOSE: ICD-10-CM
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
## 2022-05-24 PROBLEM — S92.302P: Status: RESOLVED | Noted: 2020-11-06 | Resolved: 2022-05-24

## 2022-05-24 PROCEDURE — 99214 OFFICE O/P EST MOD 30 MIN: CPT | Mod: S$GLB,,, | Performed by: FAMILY MEDICINE

## 2022-05-24 PROCEDURE — 93010 EKG 12-LEAD: ICD-10-PCS | Mod: S$GLB,,, | Performed by: INTERNAL MEDICINE

## 2022-05-24 PROCEDURE — 3075F SYST BP GE 130 - 139MM HG: CPT | Mod: CPTII,S$GLB,, | Performed by: FAMILY MEDICINE

## 2022-05-24 PROCEDURE — 99999 PR PBB SHADOW E&M-EST. PATIENT-LVL IV: ICD-10-PCS | Mod: PBBFAC,,, | Performed by: FAMILY MEDICINE

## 2022-05-24 PROCEDURE — 93005 EKG 12-LEAD: ICD-10-PCS | Mod: S$GLB,,, | Performed by: FAMILY MEDICINE

## 2022-05-24 PROCEDURE — 3008F BODY MASS INDEX DOCD: CPT | Mod: CPTII,S$GLB,, | Performed by: FAMILY MEDICINE

## 2022-05-24 PROCEDURE — 1159F MED LIST DOCD IN RCRD: CPT | Mod: CPTII,S$GLB,, | Performed by: FAMILY MEDICINE

## 2022-05-24 PROCEDURE — 99214 PR OFFICE/OUTPT VISIT, EST, LEVL IV, 30-39 MIN: ICD-10-PCS | Mod: S$GLB,,, | Performed by: FAMILY MEDICINE

## 2022-05-24 PROCEDURE — 3075F PR MOST RECENT SYSTOLIC BLOOD PRESS GE 130-139MM HG: ICD-10-PCS | Mod: CPTII,S$GLB,, | Performed by: FAMILY MEDICINE

## 2022-05-24 PROCEDURE — 99999 PR PBB SHADOW E&M-EST. PATIENT-LVL IV: CPT | Mod: PBBFAC,,, | Performed by: FAMILY MEDICINE

## 2022-05-24 PROCEDURE — 93010 ELECTROCARDIOGRAM REPORT: CPT | Mod: S$GLB,,, | Performed by: INTERNAL MEDICINE

## 2022-05-24 PROCEDURE — 3008F PR BODY MASS INDEX (BMI) DOCUMENTED: ICD-10-PCS | Mod: CPTII,S$GLB,, | Performed by: FAMILY MEDICINE

## 2022-05-24 PROCEDURE — 1159F PR MEDICATION LIST DOCUMENTED IN MEDICAL RECORD: ICD-10-PCS | Mod: CPTII,S$GLB,, | Performed by: FAMILY MEDICINE

## 2022-05-24 PROCEDURE — 3078F PR MOST RECENT DIASTOLIC BLOOD PRESSURE < 80 MM HG: ICD-10-PCS | Mod: CPTII,S$GLB,, | Performed by: FAMILY MEDICINE

## 2022-05-24 PROCEDURE — 1160F RVW MEDS BY RX/DR IN RCRD: CPT | Mod: CPTII,S$GLB,, | Performed by: FAMILY MEDICINE

## 2022-05-24 PROCEDURE — 1160F PR REVIEW ALL MEDS BY PRESCRIBER/CLIN PHARMACIST DOCUMENTED: ICD-10-PCS | Mod: CPTII,S$GLB,, | Performed by: FAMILY MEDICINE

## 2022-05-24 PROCEDURE — 93005 ELECTROCARDIOGRAM TRACING: CPT | Mod: S$GLB,,, | Performed by: FAMILY MEDICINE

## 2022-05-24 PROCEDURE — 3078F DIAST BP <80 MM HG: CPT | Mod: CPTII,S$GLB,, | Performed by: FAMILY MEDICINE

## 2022-05-24 NOTE — TELEPHONE ENCOUNTER
Order for COVID preop testing in place. Attempted to reach out to pt in regards to what time to schedule appt. Left voice message. Encouraged pt to please call or send message through pt portal.

## 2022-05-24 NOTE — PROGRESS NOTES
FAMILY MEDICINE  [facility]    Reason for visit:   Chief Complaint   Patient presents with    Pre-op Exam       HPI: Alyssa Robledo is a 41 y.o. female  - with obesity, anxiety and hypertension present for preoperative evaluation    Gynecology Dr. Yanez [facility]  Podiatry Dr. Goetz    1. Preoperative evaluation     Surgery: left foot hardware removal for pain from hardware  Surgeon: Dr. Ha Goetz  Date of Surgery: 22  [facility]  Anesthesia: MAC    Prior surgeries: see below  Reactions to anesthesia: vertigo and needs scopolamine patch  Prior post-operative complications: denies         Review of Systems   All other systems reviewed and are negative.      HISTORY:   Past Medical History:   Diagnosis Date    Anxiety     Closed fracture of head of metatarsal, left, with malunion, subsequent encounter 2020    GERD (gastroesophageal reflux disease)        Past Surgical History:   Procedure Laterality Date     SECTION      2    CORRECTION OF HAMMER TOE Left 2020    Procedure: CORRECTION, HAMMER TOE;  Surgeon: Ha Goetz DPM;  Location: [facility] OR;  Service: Podiatry;  Laterality: Left;  mini c-arm, Arthrex toe implant, toes 3,4Mallory confirmed 2020 0745 KB    DILATION AND CURETTAGE OF UTERUS      OSTEOTOMY OF METATARSAL BONE Left 2020    Procedure: OSTEOTOMY, METATARSAL BONE;  Surgeon: Ha Goetz DPM;  Location: [facility] OR;  Service: Podiatry;  Laterality: Left;  Arthrex screws, dorsal exostectomy foot/deep peroneal nerve release included (Cuca notified 10/26, EF)       Family History   Problem Relation Age of Onset    No Known Problems Mother     No Known Problems Father     Breast cancer Maternal Grandmother     Heart disease Neg Hx     Cancer Neg Hx        Social History     Tobacco Use    Smoking status: Never Smoker    Smokeless tobacco: Never Used   Substance Use Topics    Alcohol use: Yes     Alcohol/week: 0.8  "standard drinks     Types: 1 Standard drinks or equivalent per week     Comment: 3     Drug use: No       Social History     Social History Narrative    Live in [place] with  and 2 daughters (2021 14 yo and 7yo). 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 Non-smoker. No exposure to blood; No risk for STD's. Socially drinks alcohol;  is employed by [employer] as a . 8/2021  and youngest daughter had covid-19 and did well.                      ALLERGIES:   Review of patient's allergies indicates:   Allergen Reactions    Phenergan [promethazine] Hives       MEDS:     Current Outpatient Medications:     butalbital-acetaminophen-caffeine -40 mg (FIORICET, ESGIC) -40 mg per tablet, Take 1 tablet by mouth every 4 (four) hours as needed for Pain., Disp: , Rfl:     cetirizine (ZYRTEC) 10 MG tablet, Take 10 mg by mouth once daily., Disp: , Rfl:     chlorthalidone (HYGROTEN) 50 MG Tab, Take 1 tablet (50 mg total) by mouth once daily., Disp: 90 tablet, Rfl: 3    esomeprazole (NEXIUM) 20 MG capsule, Take 20 mg by mouth., Disp: , Rfl:     FLUoxetine 40 MG capsule, TAKE 1 CAPSULE BY MOUTH ONCE DAILY, Disp: 90 capsule, Rfl: 4    Lactobacillus acidophilus 10 billion cell Cap, Take 1 tablet by mouth once daily., Disp: , Rfl:     OCELLA 3-0.03 mg per tablet, , Disp: , Rfl:     potassium chloride SA (K-DUR,KLOR-CON) 20 MEQ tablet, TAKE ONE TABLET BY MOUTH 2 TIMES A DAY, Disp: 180 tablet, Rfl: 2    azelastine (ASTELIN) 137 mcg (0.1 %) nasal spray, 1 spray (137 mcg total) by Nasal route 2 (two) times daily., Disp: 30 mL, Rfl: 11    Current Facility-Administered Medications:     sodium chloride 0.9% flush 10 mL, 10 mL, Intravenous, PRN, Ha Goetz, DPM    sodium chloride 0.9% flush 10 mL, 10 mL, Intravenous, PRN, Ha Goetz, DPM    Vital signs:   Vitals:    05/24/22 1015   BP: 132/76   Pulse: 79   SpO2: 98%   Weight: 93 kg (205 lb)   Height: 5' 9" (1.753 m)     Body " mass index is 30.27 kg/m².    PHYSICAL EXAM:     Physical Exam  Constitutional:       General: She is not in acute distress.  Cardiovascular:      Rate and Rhythm: Normal rate and regular rhythm.      Heart sounds: Normal heart sounds. No murmur heard.    No friction rub. No gallop.   Pulmonary:      Effort: Pulmonary effort is normal.      Breath sounds: Normal breath sounds. No wheezing, rhonchi or rales.   Musculoskeletal:      Cervical back: Neck supple.      Right lower leg: No edema.      Left lower leg: No edema.   Neurological:      Mental Status: She is alert.                 PERTINENT RESULTS:   Lab Visit on 05/20/2022   Component Date Value Ref Range Status    WBC 05/20/2022 7.80  3.90 - 12.70 K/uL Final    RBC 05/20/2022 4.57  4.00 - 5.40 M/uL Final    Hemoglobin 05/20/2022 13.3  12.0 - 16.0 g/dL Final    Hematocrit 05/20/2022 39.8  37.0 - 48.5 % Final    MCV 05/20/2022 87  82 - 98 fL Final    MCH 05/20/2022 29.1  27.0 - 31.0 pg Final    MCHC 05/20/2022 33.4  32.0 - 36.0 g/dL Final    RDW 05/20/2022 13.0  11.5 - 14.5 % Final    Platelets 05/20/2022 303  150 - 450 K/uL Final    MPV 05/20/2022 11.0  9.2 - 12.9 fL Final    Immature Granulocytes 05/20/2022 0.3  0.0 - 0.5 % Final    Gran # (ANC) 05/20/2022 5.1  1.8 - 7.7 K/uL Final    Immature Grans (Abs) 05/20/2022 0.02  0.00 - 0.04 K/uL Final    Comment: Mild elevation in immature granulocytes is non specific and   can be seen in a variety of conditions including stress response,   acute inflammation, trauma and pregnancy. Correlation with other   laboratory and clinical findings is essential.      Lymph # 05/20/2022 1.7  1.0 - 4.8 K/uL Final    Mono # 05/20/2022 0.8  0.3 - 1.0 K/uL Final    Eos # 05/20/2022 0.2  0.0 - 0.5 K/uL Final    Baso # 05/20/2022 0.07  0.00 - 0.20 K/uL Final    nRBC 05/20/2022 0  0 /100 WBC Final    Gran % 05/20/2022 65.7  38.0 - 73.0 % Final    Lymph % 05/20/2022 21.2  18.0 - 48.0 % Final    Mono % 05/20/2022  10.0  4.0 - 15.0 % Final    Eosinophil % 05/20/2022 1.9  0.0 - 8.0 % Final    Basophil % 05/20/2022 0.9  0.0 - 1.9 % Final    Differential Method 05/20/2022 Automated   Final    Sodium 05/20/2022 135 (A) 136 - 145 mmol/L Final    Potassium 05/20/2022 3.5  3.5 - 5.1 mmol/L Final    Chloride 05/20/2022 97  95 - 110 mmol/L Final    CO2 05/20/2022 27  23 - 29 mmol/L Final    Glucose 05/20/2022 112 (A) 70 - 110 mg/dL Final    BUN 05/20/2022 14  7 - 17 mg/dL Final    Creatinine 05/20/2022 0.56  0.50 - 1.40 mg/dL Final    Calcium 05/20/2022 8.8  8.7 - 10.5 mg/dL Final    Anion Gap 05/20/2022 11  8 - 16 mmol/L Final    eGFR if African American 05/20/2022 >60.0  >60 mL/min/1.73 m^2 Final    eGFR if non African American 05/20/2022 >60.0  >60 mL/min/1.73 m^2 Final    Comment: Calculation used to obtain the estimated glomerular filtration  rate (eGFR) is the CKD-EPI equation.        Results for orders placed or performed in visit on 05/24/22   IN OFFICE EKG 12-LEAD (to [place])    Collection Time: 05/24/22 10:44 AM    Narrative    Test Reason : I10,Z01.818,    Vent. Rate : 075 BPM     Atrial Rate : 075 BPM     P-R Int : 124 ms          QRS Dur : 098 ms      QT Int : 434 ms       P-R-T Axes : 031 011 011 degrees     QTc Int : 484 ms    Normal sinus rhythm  Prolonged QT  Abnormal ECG  When compared with ECG of 29-OCT-2020 11:30,  Incomplete right bundle branch block is no longer Present  Confirmed by Huseyin JOAQUIN MD, Jermaine CAGE ([id number]) on 5/24/2022 4:30:47 PM    Referred By:             Confirmed By:Jermaine Fontanez III, MD       ASSESSMENT/PLAN:  Problem List Items Addressed This Visit        Cardiac/Vascular    Essential hypertension    Overview     - well controlled  - continue current medication           Relevant Orders    IN OFFICE EKG 12-LEAD (to [place]) (Completed)       Renal/    Hypokalemia    Overview     - secondary to chlorthalidone however patient opted to stay on medication take potassium  supplementation  -she is tolerating potassium supplementation potassium levels well controlled.  Lab Results   Component Value Date    K 3.5 05/20/2022                   Endocrine    Impaired fasting glucose    Overview     Lab Results   Component Value Date    HGBA1C 5.3 12/17/2021     - discussed recommendation for diet and cardiovascular exercise  - counseling on lifestyle modifications for risk factor reduction                Other    Preoperative examination - Primary    Overview     Preoperative Assessment:  Cardiovascular risk assessment:  Non-emergent surgery:   No active cardiopulmonary issues.  Surgery risk: low  Functional status: 8 Mets  Revised cardiac index: 0.4% low risk   NSQUIP: see above = below average risk for complications    Other issues:  Oral anticoagulation: none  Cardiac stent: none    Recommendations:  1. Proceed with surgery             Relevant Orders    IN OFFICE EKG 12-LEAD (to [place]) (Completed)          ORDERS:   Orders Placed This Encounter    IN OFFICE EKG 12-LEAD (to [place])       Vaccines recommended: covid-19 and she declines    This note is dictated using the M*Modal Fluency Direct word recognition program. There are word recognition mistakes that are occasionally missed on review.    Dr. Alba Mayers D.O.   Family Medicine

## 2022-05-25 ENCOUNTER — TELEPHONE (OUTPATIENT)
Dept: PODIATRY | Facility: CLINIC | Age: 42
End: 2022-05-25
Payer: COMMERCIAL

## 2022-05-25 NOTE — TELEPHONE ENCOUNTER
Called and spoke with Ms Robledo to assist with scheduling her COVID test prior to surgery. Pt accepted appt time and date of 6/6/22 at 9 am at the [facility]. No other needs voiced at this time. Encouraged to call if further assistance is needed.

## 2022-06-06 ENCOUNTER — PATIENT MESSAGE (OUTPATIENT)
Dept: SURGERY | Facility: HOSPITAL | Age: 42
End: 2022-06-06
Payer: COMMERCIAL

## 2022-06-06 ENCOUNTER — LAB VISIT (OUTPATIENT)
Dept: FAMILY MEDICINE | Facility: CLINIC | Age: 42
End: 2022-06-06
Payer: COMMERCIAL

## 2022-06-06 DIAGNOSIS — Z01.818 PREOP TESTING: ICD-10-CM

## 2022-06-06 PROCEDURE — U0005 INFEC AGEN DETEC AMPLI PROBE: HCPCS | Performed by: PODIATRIST

## 2022-06-06 PROCEDURE — U0003 INFECTIOUS AGENT DETECTION BY NUCLEIC ACID (DNA OR RNA); SEVERE ACUTE RESPIRATORY SYNDROME CORONAVIRUS 2 (SARS-COV-2) (CORONAVIRUS DISEASE [COVID-19]), AMPLIFIED PROBE TECHNIQUE, MAKING USE OF HIGH THROUGHPUT TECHNOLOGIES AS DESCRIBED BY CMS-2020-01-R: HCPCS | Performed by: PODIATRIST

## 2022-06-07 ENCOUNTER — HOSPITAL ENCOUNTER (OUTPATIENT)
Dept: PREADMISSION TESTING | Facility: HOSPITAL | Age: 42
Discharge: HOME | End: 2022-06-07
Attending: PODIATRIST
Payer: COMMERCIAL

## 2022-06-07 ENCOUNTER — ANESTHESIA EVENT (OUTPATIENT)
Dept: SURGERY | Facility: HOSPITAL | Age: 42
End: 2022-06-07
Payer: COMMERCIAL

## 2022-06-07 LAB — SARS-COV-2 RNA RESP QL NAA+PROBE: NOT DETECTED

## 2022-06-07 RX ORDER — SODIUM CHLORIDE, SODIUM LACTATE, POTASSIUM CHLORIDE, CALCIUM CHLORIDE 600; 310; 30; 20 MG/100ML; MG/100ML; MG/100ML; MG/100ML
INJECTION, SOLUTION INTRAVENOUS CONTINUOUS
Status: CANCELLED | OUTPATIENT
Start: 2022-06-07

## 2022-06-07 RX ORDER — SCOLOPAMINE TRANSDERMAL SYSTEM 1 MG/1
1 PATCH, EXTENDED RELEASE TRANSDERMAL
Status: CANCELLED | OUTPATIENT
Start: 2022-06-07

## 2022-06-07 RX ORDER — LIDOCAINE HYDROCHLORIDE 10 MG/ML
1 INJECTION, SOLUTION EPIDURAL; INFILTRATION; INTRACAUDAL; PERINEURAL ONCE
Status: CANCELLED | OUTPATIENT
Start: 2022-06-07 | End: 2022-06-07

## 2022-06-07 NOTE — ANESTHESIA PREPROCEDURE EVALUATION
2022  Alyssa Robledo is a 41 y.o., female scheduled for REMOVAL, HARDWARE, FOOT (Left Foot) 22.    H&P completed 22    Past Medical History:   Diagnosis Date    Anxiety     Closed fracture of head of metatarsal, left, with malunion, subsequent encounter 2020    GERD (gastroesophageal reflux disease)      Past Surgical History:   Procedure Laterality Date     SECTION      2    CORRECTION OF HAMMER TOE Left 2020    Procedure: CORRECTION, HAMMER TOE;  Surgeon: Ha Goetz DPM;  Location: [facility] OR;  Service: Podiatry;  Laterality: Left;  mini c-arm, Arthrex toe implant, toes 3,4Mallory confirmed 2020 0745 KB    DILATION AND CURETTAGE OF UTERUS      OSTEOTOMY OF METATARSAL BONE Left 2020    Procedure: OSTEOTOMY, METATARSAL BONE;  Surgeon: Ha Goetz DPM;  Location: [facility] OR;  Service: Podiatry;  Laterality: Left;  Arthrex screws, dorsal exostectomy foot/deep peroneal nerve release included (Cuca notified 10/26, EF)       Pre-op Assessment    I have reviewed the Patient Summary Reports.     I have reviewed the Nursing Notes. I have reviewed the NPO Status.   I have reviewed the Medications.     Review of Systems  Anesthesia Hx:  Hx of Anesthetic complications  History of prior surgery of interest to airway management or planning: Personal Hx of Anesthesia complications, Post-Operative Nausea/Vomiting, in the past, but not with recent anesthetics / prophylaxis   Social:  Non-Smoker, Social Alcohol Use    Hematology/Oncology:  Hematology Normal        Cardiovascular:   Exercise tolerance: good Hypertension, well controlled  Denies Angina.    Pulmonary:  Pulmonary Normal  Denies Shortness of breath.    Renal/:  Renal/ Normal     Hepatic/GI:   GERD, well controlled    Neurological:   Denies TIA. Denies CVA. Neuromuscular Disease,  Headaches Denies  Seizures.    Endocrine:  Endocrine Normal  Obesity / BMI > 30  Psych:   anxiety depression          Physical Exam    Dental:  Intact    EKG 5/24/22  Normal sinus rhythm   Prolonged QT   Abnormal ECG   When compared with ECG of 29-OCT-2020 11:30,   Incomplete right bundle branch block is no longer Present   Confirmed by Jermaine Fontanez III, MD ([id number]) on 5/24/2022 4:30:47 PM     Anesthesia Plan  Type of Anesthesia, risks & benefits discussed:    Anesthesia Type: MAC, Regional  Intra-op Monitoring Plan: Standard ASA Monitors  Post Op Pain Control Plan: multimodal analgesia  Induction:  IV  Airway Plan: Direct  Informed Consent: Informed consent signed with the Patient and all parties understand the risks and agree with anesthesia plan.  All questions answered.   ASA Score: 2  Anesthesia Plan Notes: Anesthesia consent to be signed prior to surgery 6/8/22      Ready For Surgery From Anesthesia Perspective.     .

## 2022-06-07 NOTE — PRE-PROCEDURE INSTRUCTIONS
Marc Venkat [phone]    Allergies, medical, surgical, family and psychosocial histories reviewed with patient. Periop plan of care reviewed. Preop instructions given, including medications to take and to hold. Hibiclens soap and instructions on use given. Time allotted for questions to be addressed.  Patient verbalized understanding.

## 2022-06-07 NOTE — DISCHARGE INSTRUCTIONS
Your surgery is scheduled for 6/8/22.    Please report to Hospital [facility] Lobby on the 1st Floor at 0930 a.m.    THIS TIME IS SUBJECT TO CHANGE.  YOU WILL RECEIVE A PHONE CALL THE DAY BEFORE SURGERY BY 3:30 PM TO CONFIRM YOUR TIME OF ARRIVAL.  IF YOU HAVE NOT RECEIVED A PHONE CALL BY 3:30 PM THE DAY BEFORE YOUR SURGERY PLEASE CALL [phone]     INSTRUCTIONS IMPORTANT!!!  ¨ Do not eat or drink after 12 midnight-including water, candy, gum, & mints. OK to brush teeth.              ____  Do not wear makeup, including mascara.  ____  No powder, lotions or creams to surgical area.  ____  Please remove all jewelry, including piercings and leave at home.  ____  No money or valuables needed. Please leave at home.  ____  Please bring any documents given by your doctor.  ____  If going home the same day, arrange for a ride home. You will not be able to             drive if Anesthesia was used.  ____  Children under 18 years require a parent / guardian present the entire time             they are in surgery / recovery.  ____  Wear loose fitting clothing. Allow for dressings, bandages.  ____  Stop Aspirin, Ibuprofen, Motrin, Aleve, Goody's/BC powders, Excedrine and Naproxen (NSAIDS) at least 3-5 days before surgery, unless otherwise instructed by your doctor, or the nurse.   You MAY use Tylenol/acetaminophen until day of surgery.  ____  Wash the surgical area with Hibiclens the night before surgery, and again the             morning of surgery.  Be sure to rinse hibiclens off completely (if instructed by   nurse).  ____  If you take diabetic medication, do not take am of surgery unless instructed by Doctor.  ____  Call MD for temperature above 101 degrees or any other signs of infection such as Urinary (bladder) infection, Upper respiratory infection, skin boils, etc.  ____ Stop taking any Fish Oil supplement or any Vitamins that contain Vitamin E at least 5 days prior to surgery.  ____ Do Not wear your contact lenses the  day of your procedure.  You may wear your glasses.      ____Do not shave surgical site for 3 days prior to surgery.  ____ Practice Good hand washing before, during, and after procedure.      I have read or had read and explained to me, and understand the above information.  Additional comments or instructions:  For additional questions call [phone]      ANESTHESIA SIDE EFFECTS  -For the first 24 hours after surgery:  Do not drive, use heavy equipment, make important decisions, or drink alcohol  -It is normal to feel sleepy for several hours.  Rest until you are more awake.  -Have someone stay with you, if needed.  They can watch for problems and help keep you safe.  -Some possible post anesthesia side effects include: nausea and vomiting, sore throat and hoarseness, sleepiness, and dizziness.        Pre-Op Bathing Instructions    Before surgery, you can play an important role in your own health.    Because skin is not sterile, we need to be sure that your skin is as free of germs as possible. By following the instructions below, you can reduce the number of germs on your skin before surgery.    IMPORTANT: You will need to shower with a special soap called Hibiclens*, available at any pharmacy.  If you are allergic to Chlorhexidine (the antiseptic in Hibiclens), use an antibacterial soap such as Dial Soap for your preoperative shower.  You will shower with Hibiclens both the night before your surgery and the morning of your surgery.  Do not use Hibiclens on the head, face or genitals to avoid injury to those areas.    STEP #1: THE NIGHT BEFORE YOUR SURGERY     Do not shave the area of your body where your surgery will be performed.  Shower and wash your hair and body as usual with your normal soap and shampoo.  Rinse your hair and body thoroughly after you shower to remove all soap residue.  With your hand, apply one packet of Hibiclens soap to the surgical site.   Wash the site gently for five (5) minutes. Do not  scrub your skin too hard.   Do not wash with your regular soap after Hibiclens is used.  Rinse your body thoroughly.  Pat yourself dry with a clean, soft towel.  Do not use lotion, cream, or powder.  Wear clean clothes.    STEP #2: THE MORNING OF YOUR SURGERY     Repeat Step #1.    * Not to be used by people allergic to Chlorhexidine.'

## 2022-06-08 ENCOUNTER — ANESTHESIA (OUTPATIENT)
Dept: SURGERY | Facility: HOSPITAL | Age: 42
End: 2022-06-08
Payer: COMMERCIAL

## 2022-06-08 ENCOUNTER — HOSPITAL ENCOUNTER (OUTPATIENT)
Facility: HOSPITAL | Age: 42
Discharge: HOME | End: 2022-06-08
Attending: PODIATRIST | Admitting: PODIATRIST
Payer: COMMERCIAL

## 2022-06-08 VITALS
OXYGEN SATURATION: 98 % | WEIGHT: 205 LBS | SYSTOLIC BLOOD PRESSURE: 141 MMHG | BODY MASS INDEX: 30.36 KG/M2 | TEMPERATURE: 98 F | DIASTOLIC BLOOD PRESSURE: 81 MMHG | RESPIRATION RATE: 18 BRPM | HEART RATE: 79 BPM | HEIGHT: 69 IN

## 2022-06-08 DIAGNOSIS — Z98.890 POSTOPERATIVE STATE: Primary | ICD-10-CM

## 2022-06-08 DIAGNOSIS — T85.848D PAIN FROM IMPLANTED HARDWARE, SUBSEQUENT ENCOUNTER: ICD-10-CM

## 2022-06-08 PROBLEM — T85.848A PAIN FROM IMPLANTED HARDWARE: Status: ACTIVE | Noted: 2022-06-08

## 2022-06-08 LAB
B-HCG UR QL: NEGATIVE
CTP QC/QA: YES

## 2022-06-08 PROCEDURE — 36000706: Performed by: PODIATRIST

## 2022-06-08 PROCEDURE — 71000016 HC POSTOP RECOV ADDL HR: Performed by: PODIATRIST

## 2022-06-08 PROCEDURE — 37000009 HC ANESTHESIA EA ADD 15 MINS: Performed by: PODIATRIST

## 2022-06-08 PROCEDURE — 63600175 PHARM REV CODE 636 W HCPCS: Performed by: NURSE ANESTHETIST, CERTIFIED REGISTERED

## 2022-06-08 PROCEDURE — 25000003 PHARM REV CODE 250: Performed by: PODIATRIST

## 2022-06-08 PROCEDURE — 81025 URINE PREGNANCY TEST: CPT | Performed by: PODIATRIST

## 2022-06-08 PROCEDURE — 37000008 HC ANESTHESIA 1ST 15 MINUTES: Performed by: PODIATRIST

## 2022-06-08 PROCEDURE — 36000707: Performed by: PODIATRIST

## 2022-06-08 PROCEDURE — 25000003 PHARM REV CODE 250: Performed by: NURSE PRACTITIONER

## 2022-06-08 PROCEDURE — 71000015 HC POSTOP RECOV 1ST HR: Performed by: PODIATRIST

## 2022-06-08 PROCEDURE — 25000003 PHARM REV CODE 250: Performed by: NURSE ANESTHETIST, CERTIFIED REGISTERED

## 2022-06-08 PROCEDURE — 63600175 PHARM REV CODE 636 W HCPCS: Performed by: PODIATRIST

## 2022-06-08 PROCEDURE — 20670 PR REMOVAL SUPERFICIAL IMPLANT: ICD-10-PCS | Mod: LT,,, | Performed by: PODIATRIST

## 2022-06-08 PROCEDURE — 20670 REMOVAL IMPLANT SUPERFICIAL: CPT | Mod: LT,,, | Performed by: PODIATRIST

## 2022-06-08 RX ORDER — SCOLOPAMINE TRANSDERMAL SYSTEM 1 MG/1
1 PATCH, EXTENDED RELEASE TRANSDERMAL
Status: DISCONTINUED | OUTPATIENT
Start: 2022-06-08 | End: 2022-06-08 | Stop reason: HOSPADM

## 2022-06-08 RX ORDER — IBUPROFEN 800 MG/1
800 TABLET ORAL EVERY 6 HOURS PRN
Qty: 30 TABLET | Refills: 1 | Status: SHIPPED | OUTPATIENT
Start: 2022-06-08 | End: 2023-09-14

## 2022-06-08 RX ORDER — CEFAZOLIN SODIUM 2 G/50ML
2 SOLUTION INTRAVENOUS
Status: COMPLETED | OUTPATIENT
Start: 2022-06-08 | End: 2022-06-08

## 2022-06-08 RX ORDER — LIDOCAINE HYDROCHLORIDE 20 MG/ML
INJECTION INTRAVENOUS
Status: DISCONTINUED | OUTPATIENT
Start: 2022-06-08 | End: 2022-06-08

## 2022-06-08 RX ORDER — BUPIVACAINE HYDROCHLORIDE 2.5 MG/ML
INJECTION, SOLUTION EPIDURAL; INFILTRATION; INTRACAUDAL
Status: DISCONTINUED | OUTPATIENT
Start: 2022-06-08 | End: 2022-06-08 | Stop reason: HOSPADM

## 2022-06-08 RX ORDER — TRAMADOL HYDROCHLORIDE 50 MG/1
50 TABLET ORAL ONCE
Status: COMPLETED | OUTPATIENT
Start: 2022-06-08 | End: 2022-06-08

## 2022-06-08 RX ORDER — TRAMADOL HYDROCHLORIDE 50 MG/1
50 TABLET ORAL EVERY 6 HOURS PRN
Qty: 20 TABLET | Refills: 0 | Status: SHIPPED | OUTPATIENT
Start: 2022-06-08 | End: 2023-09-14

## 2022-06-08 RX ORDER — LIDOCAINE HYDROCHLORIDE 10 MG/ML
INJECTION INFILTRATION; PERINEURAL
Status: DISCONTINUED | OUTPATIENT
Start: 2022-06-08 | End: 2022-06-08 | Stop reason: HOSPADM

## 2022-06-08 RX ORDER — PROPOFOL 10 MG/ML
VIAL (ML) INTRAVENOUS
Status: DISCONTINUED | OUTPATIENT
Start: 2022-06-08 | End: 2022-06-08

## 2022-06-08 RX ORDER — PROPOFOL 10 MG/ML
VIAL (ML) INTRAVENOUS CONTINUOUS PRN
Status: DISCONTINUED | OUTPATIENT
Start: 2022-06-08 | End: 2022-06-08

## 2022-06-08 RX ORDER — LIDOCAINE HYDROCHLORIDE 10 MG/ML
1 INJECTION, SOLUTION EPIDURAL; INFILTRATION; INTRACAUDAL; PERINEURAL ONCE
Status: DISCONTINUED | OUTPATIENT
Start: 2022-06-08 | End: 2022-06-08 | Stop reason: HOSPADM

## 2022-06-08 RX ORDER — MIDAZOLAM HYDROCHLORIDE 1 MG/ML
INJECTION, SOLUTION INTRAMUSCULAR; INTRAVENOUS
Status: DISCONTINUED | OUTPATIENT
Start: 2022-06-08 | End: 2022-06-08

## 2022-06-08 RX ORDER — FENTANYL CITRATE 50 UG/ML
INJECTION, SOLUTION INTRAMUSCULAR; INTRAVENOUS
Status: DISCONTINUED | OUTPATIENT
Start: 2022-06-08 | End: 2022-06-08

## 2022-06-08 RX ORDER — HYDROCODONE BITARTRATE AND ACETAMINOPHEN 5; 325 MG/1; MG/1
1 TABLET ORAL EVERY 4 HOURS PRN
Status: DISCONTINUED | OUTPATIENT
Start: 2022-06-08 | End: 2022-06-08 | Stop reason: HOSPADM

## 2022-06-08 RX ORDER — SODIUM CHLORIDE, SODIUM LACTATE, POTASSIUM CHLORIDE, CALCIUM CHLORIDE 600; 310; 30; 20 MG/100ML; MG/100ML; MG/100ML; MG/100ML
INJECTION, SOLUTION INTRAVENOUS CONTINUOUS
Status: DISCONTINUED | OUTPATIENT
Start: 2022-06-08 | End: 2022-06-08 | Stop reason: HOSPADM

## 2022-06-08 RX ADMIN — LIDOCAINE HYDROCHLORIDE 100 MG: 20 INJECTION, SOLUTION INTRAVENOUS at 11:06

## 2022-06-08 RX ADMIN — PROPOFOL 40 MG: 10 INJECTION, EMULSION INTRAVENOUS at 11:06

## 2022-06-08 RX ADMIN — FENTANYL CITRATE 25 MCG: 50 INJECTION, SOLUTION INTRAMUSCULAR; INTRAVENOUS at 11:06

## 2022-06-08 RX ADMIN — MIDAZOLAM 2 MG: 1 INJECTION INTRAMUSCULAR; INTRAVENOUS at 11:06

## 2022-06-08 RX ADMIN — SODIUM CHLORIDE, SODIUM LACTATE, POTASSIUM CHLORIDE, AND CALCIUM CHLORIDE: .6; .31; .03; .02 INJECTION, SOLUTION INTRAVENOUS at 10:06

## 2022-06-08 RX ADMIN — CEFAZOLIN SODIUM 2 G: 2 SOLUTION INTRAVENOUS at 11:06

## 2022-06-08 RX ADMIN — PROPOFOL 150 MCG/KG/MIN: 10 INJECTION, EMULSION INTRAVENOUS at 11:06

## 2022-06-08 RX ADMIN — SCOPALAMINE 1 PATCH: 1 PATCH, EXTENDED RELEASE TRANSDERMAL at 10:06

## 2022-06-08 RX ADMIN — TRAMADOL HYDROCHLORIDE 50 MG: 50 TABLET, FILM COATED ORAL at 12:06

## 2022-06-08 NOTE — BRIEF OP NOTE
[facility] - [facility] (Hospital)  Brief Operative Note    Surgery Date: 6/8/2022     Surgeon(s) and Role:     * Ha Goetz DPM - Primary    Assisting Surgeon: None    Pre-op Diagnosis:  Pain from implanted hardware, subsequent encounter [T85.848D]    Post-op Diagnosis:  Post-Op Diagnosis Codes:     * Pain from implanted hardware, subsequent encounter [T85.848D]    Procedure(s) (LRB):  REMOVAL, HARDWARE, FOOT (Left)    Anesthesia: Local MAC    Operative Findings: removed small plate and 3 screws    Estimated Blood Loss: 1 mL         Specimens:   Specimen (24h ago, onward)            None            Discharge Note    OUTCOME: Patient tolerated treatment/procedure well without complication and is now ready for discharge.    DISPOSITION: Home or Self Care    FINAL DIAGNOSIS:  Pain from implanted hardware    FOLLOWUP: In clinic    DISCHARGE INSTRUCTIONS:    Discharge Procedure Orders   Diet general     Ice to affected area     Keep surgical extremity elevated     Remove dressing in 72 hours   Order Comments: May shower but do not submerge in water. Keep dry and protected with band aid.

## 2022-06-08 NOTE — DISCHARGE INSTRUCTIONS
Discharge Instructions for Foot Surgery  Arrange to have an adult drive you home after surgery. If you had general anesthesia, it may take a day or more to fully recover. So, for at least the next 24 hours: Do not drive or use machinery or power tools; do not drink alcohol; and do not make any major decisions.  Diet  Here are some dietary suggestions following surgery:   Start with liquids and light foods (like dry toast, bananas, and applesauce). As you feel up to it, slowly return to your normal diet.  Drink at least 6 to 8 glasses of water or other nonalcoholic fluids a day.  To avoid nausea, eat before taking narcotic pain medicines.  Medicines  It is important to follow these directions:   Take all medicines as instructed.  Take pain medicines on time. Do not wait until the pain is bad before taking your medicines.  Avoid alcohol while on pain medicines.  Activity  These instructions are to help with your recovery:   Sit or lie down when possible. Put a pillow under your heel to raise your foot above the level of your heart.  Wrap an ice pack or bag of frozen peas in a thin cloth. Place it over your bandaged foot for no longer than 20 minutes. Do this three times a day.  You can drive again as instructed by your healthcare provider.  Wear your surgical shoe at all times unless told otherwise by your healthcare provider. WEAR SURGICAL AT ALL TIME WHEN AMBULATING  Use crutches or a cane as directed.  Follow your healthcare providers instructions about putting weight on your foot. LIMIT WEIGHT BEARING FOR 3 DAYS THEN YOU CAN SLOWLY INCREASE AMBULATION AMOUNT AS TOLERATED  Bandage and cast care  Here are tips to follow:   Do not shower for 48 hours.  When you can shower again, cover the bandage or cast with a plastic bag to keep it dry.  Dont remove your bandage until your healthcare provider tells you to. If your bandage gets wet or dirty, check with your healthcare provider. MAY REMOVE IN 72 HOURS BUT SHOWER  ONLY AND DO NOT SUBMERGE IN WATER    What to expect  It is normal to have the following:  Bruising and slight swelling of the foot and toes  A small amount of blood on the dressing  Call your healthcare provider   Contact your healthcare provider right away if you have any of the following:   Continuous bleeding through the bandage  Excessive swelling, increased bleeding, or redness  Fever over 100.4°F (38°C) or chills  Pain unrelieved by pain medicines  Foot feels cold to the touch or numb  Increased ache in your leg or foot  Chest pain or shortness of breath  Anything unusual that concerns you   Date Last Reviewed: 9/26/2015  © 9386-8280 [employer]. [address] [place] [address], [place] [address]. All rights reserved. This information is not intended as a substitute for professional medical care. Always follow your healthcare professional's instructions.  ANESTHESIA  -For the first 24 hours after surgery:  Do not drive, use heavy equipment, make important decisions, or drink alcohol  -It is normal to feel sleepy for several hours.  Rest until you are more awake.  -Have someone stay with you, if needed.  They can watch for problems and help keep you safe.  -Some possible post anesthesia side effects include: nausea and vomiting, sore throat and hoarseness, sleepiness, and dizziness.    PAIN  -If you have pain after surgery, pain medicine will help you feel better.  Take it as directed, before pain becomes severe.  Most pain relievers taken by mouth need at least 20-30 minutes to start working.  -Do not drive or drink alcohol while taking pain medicine.  -Pain medication can upset your stomach.  Taking them with a little food may help.  -Other ways to help control pain: elevation, ice, and relaxation  -Call your surgeon if still having unmanageable pain an hour after taking pain medicine.  -Pain medicine can cause constipation.  Taking an over-the counter stool softener while on prescription pain medicine  and drinking plenty of fluids can prevent this side effect.  -Call your surgeon if you have severe side effects like: breathing problems, trouble waking up, dizziness, confusion, or severe constipation.    NAUSEA  -Some people have nausea after surgery.  This is often because of anesthesia, pain, pain medicine, or the stress of surgery.  -Do not push yourself to eat.  Start off with clear liquids and soup.  Slowly move to solid foods.  Don't eat fatty, rich, spicy foods at first.  Eat smaller amounts.  -If you develop persistent nausea and vomiting please notify your surgeon immediately.    BLEEDING  -Different types of surgery require different types of care and dressing changes.  It is important to follow all instructions and advice from your surgeon.  Change dressing as directed.  Call your surgeon for any concerns regarding postop bleeding.    SIGNS OF INFECTION  -Signs of infection include: fever, swelling, drainage, and redness  -Notify your surgeon if you have a fever of 100.4 F (38.0 C) or higher.  -Notify your surgeon if you notice redness, swelling, increased pain, pus, or a foul smell at the incision site.

## 2022-06-08 NOTE — TRANSFER OF CARE
"Anesthesia Transfer of Care Note    Patient: Alyssa Robledo    Procedure(s) Performed: Procedure(s) (LRB):  REMOVAL, HARDWARE, FOOT (Left)    Patient location: [facility]    Anesthesia Type: MAC    Transport from OR: Transported from OR on room air with adequate spontaneous ventilation    Post pain: adequate analgesia    Post assessment: no apparent anesthetic complications and tolerated procedure well    Post vital signs: stable    Level of consciousness: awake, alert and oriented    Nausea/Vomiting: no nausea/vomiting    Complications: none    Transfer of care protocol was followed      Last vitals:   Visit Vitals  BP (!) 141/83 (BP Location: Right arm, Patient Position: Sitting)   Pulse 75   Temp 36.7 °C (98.1 °F) (Temporal)   Resp 16   Ht 5' 9" (1.753 m)   Wt 93 kg (205 lb)   SpO2 98%   Breastfeeding No   BMI 30.27 kg/m²     "

## 2022-06-08 NOTE — OP NOTE
[facility] - [facility] ([facility]ospital)  Operative Note      Date of Procedure: 6/8/2022     Procedure: Procedure(s) (LRB):  REMOVAL, HARDWARE, FOOT (Left)     Surgeon(s) and Role:     * Ha Goetz DPM - Primary    Assisting Surgeon: Sammi Parson DPM PGY3    Pre-Operative Diagnosis: Pain from implanted hardware, subsequent encounter [T85.848D]    Post-Operative Diagnosis: Post-Op Diagnosis Codes:     * Pain from implanted hardware, subsequent encounter [T85.848D]    Anesthesia: Local MAC    Description of Technical Procedures:   The patient was brought to the operating room on a stretcher and transferred onto the operating table in a supine position. Following the successful induction of MAC anesthesia, a well padded nonsterile pneumatic tourniquet was placed on the left ankle. A local reverse proximal aguilar block was administered to the left foot consisting of 20 mL of a 1:1 mixture of 1% lidocaine plain and 0.25% bupivicaine plain.The left foot was prepped and draped in a sterile manner. A timeout was performed verifying the patient's identity, surgical site, allergies, and medications. All were in agreement. An esmarch was used to exsanguinate the left foot. Ankle tourniquet was inflated to 250mmHg.     A skin marker was utilized to harjeet out the surgical incision overlying previous surgical scar overlying the dorsal distal 5th metatarsal of the left foot. A #15 blade was then utilized to create a controlled depth incision through the skin down to the level of fascia. All small vessels were cauterized as necessary. The incision was then carried down through the fascia to reveal a dorsal Arthrex T plate overlying the 5th metatarsal bone.    Soft tissue was reflected from the plate to reveal 3 screw heads. A screw  was utilized to remove 3 screws and the Arthrex plate was removed in toto. A rongeur was used to smoothen the hypertrophic 5th metatarsal bone dorsally. Surgical incision site was thoroughly irrigated  with normal sterile saline solution. Deep subcutaneous tissue layer was reapproximated with 3-0 vicryl. Superficial subcutaneous tissue layer was reapproximated with 4-0 vicryl. Skin was reapproximated with 4-0 nylon suture using horizontal mattress suture technique.      Xeroform was used to cover the surgical incision site followed by 4x4 gauze and a dry sterile dressing consisting of kerlix and ACE bandage was applied to the left foot. Ankle tourniquet was deflated at 20 minutes with brisk capillary refill noted to return to all digits on the left foot. A post op surgical shoe was applied to the left foot. Patient tolerated the procedure well.      The patient was transferred to the recovery room with vital signs stable and vascular status intact. Following a period of post op monitoring, the patient was discharged home with the following post op instructions:   1. Keep dressing dry and intact until Podiatry follow up.   2.Weight bearing status:  Weightbear as tolerated left foot in post op shoe   3. All necessary prescriptions ordered and medical management will continue.   4. Contact Podiatry for all post op follow up care and if any problems arise.    Estimated Blood Loss (EBL): 1 mL           Implants:   Implant Name Type Inv. Item Serial No.  Lot No. LRB No. Used Action   PLATE T 3MM 4 HOLE LOW PROFILE - [id number]  PLATE T 3MM 4 HOLE LOW PROFILE  ARTH[employer]X  Left 1 Explanted   .045 kwire    [employer]TH[employer]X  Left 1 Explanted   2.4x12 locking screw      Left 1 Explanted   SCREW 2.4X10MM VA LP TITANIUM - [id number]  SCREW 2.4X10MM VA LP TITANIUM  ARTHREX  Left 1 Explanted   WIRE K SMALL BALL BB-DANTE - [id number]  WIRE K SMALL BALL BB-DANTE  [employer]TH[employer]X  Left 2 Explanted   SCREW LOCKING VA LP 2.4X14MM - [id number]  SCREW LOCKING VA LP 2.4X14MM  ARTHREX  Left 1 Explanted       Specimens:   Specimen (24h ago, onward)                None                    Condition: Good    Disposition: PACU - hemodynamically  stable.    Attestation: I was present and scrubbed for the entire procedure.    I have personally taken the history and examined this patient and agree with the resident's note as stated as above. ]  Ha Goetz DPM, FACFAS

## 2022-06-08 NOTE — H&P
Subjective:      Patient ID: Alyssa Robledo is a 41 y.o. female.    Chief Complaint: No chief complaint on file.    Presents today complaining of persistent pain to the left foot after breaking her left 5th metatarsal at [employer] football game a year ago.  Relates that she gets pins and needles tingling to the top of the foot that radiates along the 1st and 2nd toes that is worsened when she flexes the foot down.  Also gets some moderate amount of pain underneath the ball of the left foot when she stands or walks for extended periods of time.  Pain is alleviated by rest.  She showed me an old x-ray which showed a 5th metatarsal oblique neck fracture that was significantly displaced on AP view.    10/01/2020:  Follow-up for left foot pain.  She has been wearing a silicone pad underneath the ball of foot however this seems to increase her pain.  She has been resorting to a Darco shoe and instead of tennis shoe.    11/12/2020:  Post external neurolysis of the deep peroneal nerve and dorsal medial cutaneous nerves with extensor hallucis brevis tenotomy, percutaneous flexor tenotomies toes 3 and 4, rotational osteotomy the 4th metatarsal capital fragment and osteotomy of malunited 5th metatarsal fracture left foot on 11/06/2020.  Relates only discomfort when she applies weight on the foot.  She stopped taking pain medication 4 days ago.    12/07/2020:  1 month postop.  Relates that the exposed K-wire is catching on things and is bothering her.  Otherwise she has no pain.  She is heel weight-bearing with crutches and a orthopedic boot.    1/04/2021:  2 months postop.  Relates that she has mild pain however she is on her foot for extended periods of time it becomes moderate.  She still wearing the orthopedic boot.  Denies any slips, trips or falls.  Inquiring about return to work status.    2/1/2021: 11 weeks  post op above procedure. 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 Relates to discomfort on the top of her left foot, especially at night. States her bedsheet making contact with her left foot wakes her up in the middle of the night. Also relates to pain in the left MTPJs, new onset, states she was running after a student at work last week and pain started afterwards. Denies tripping, falling.    03/08/2021:  Follow-up for pain along the top of the left foot.  States that she is unable to take gabapentin during the day however has been taking at night.  States that makes her too tired.  Relates that she has some numbness along the top of the left foot that will radiate in between the 1st and 2nd toes as well proximally up the leg.  She does not have any pain along the bottom of the left foot while wearing shoes however when she walks barefoot she does have discomfort.    01/20/2022:  Complains of intermittent pain to left foot specially of weather changes.  Walking barefoot aggravates pain underneath the ball the foot.  She feels as though she can feel the retained hardware in the foot.  She works as a school nurse.  Pain mostly described as aching sensation.    03/17/2022: Returns complaining of painful retained hardware left 5th metatarsal and painful left plantar foot callus. She is requesting removal of left foot hardware this Summer. She is wearing new athletic shoes. She has attempted a metatarsal pad for the left foot before but could not tolerate it.     06/08/22: Returns for surgical intervention. No new concerns.    Vitals:    06/08/22 1026   BP: (!) 141/83   Pulse: 75   Resp: 16   Temp: 98.1 °F (36.7 °C)   TempSrc: Temporal   SpO2: 98%   Weight: 93 kg (205 lb)   Height: 5' 9" (1.753 m)      Past Medical History:   Diagnosis Date    Anxiety     Closed fracture of head of metatarsal, left, with malunion, subsequent encounter 11/6/2020    GERD (gastroesophageal reflux disease)        Past Surgical History:   Procedure " Laterality Date     SECTION      2    CORRECTION OF HAMMER TOE Left 2020    Procedure: CORRECTION, HAMMER TOE;  Surgeon: Ha Goetz DPM;  Location: [facility] OR;  Service: Podiatry;  Laterality: Left;  mini c-arm, Arthrex toe implant, toes 3,4Mallory confirmed 2020 0745 KB    DILATION AND CURETTAGE OF UTERUS      OSTEOTOMY OF METATARSAL BONE Left 2020    Procedure: OSTEOTOMY, METATARSAL BONE;  Surgeon: Ha Goetz DPM;  Location: [facility] OR;  Service: Podiatry;  Laterality: Left;  Arthrex screws, dorsal exostectomy foot/deep peroneal nerve release included (Cuca notified 10/26, EF)       Family History   Problem Relation Age of Onset    No Known Problems Mother     No Known Problems Father     Breast cancer Maternal Grandmother     Heart disease Neg Hx     Cancer Neg Hx        Social History     Socioeconomic History    Marital status:    Occupational History     Employer: [employer]   Tobacco Use    Smoking status: Never Smoker    Smokeless tobacco: Never Used   Substance and Sexual Activity    Alcohol use: Yes     Alcohol/week: 0.8 standard drinks     Types: 1 Standard drinks or equivalent per week     Comment: 3     Drug use: No    Sexual activity: Yes     Partners: Male   Social History Narrative    Live in [place] with  and 2 daughters ( 12 yo and 9yo). 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 Non-smoker. No exposure to blood; No risk for STD's. Socially drinks alcohol;  is employed by [employer] as a . 2021  and youngest daughter had covid-19 and did well.                      No current facility-administered medications for this encounter.       Review of patient's allergies indicates:   Allergen Reactions    Phenergan [promethazine] Hives         Review of Systems   Constitutional: Negative for chills, fever and malaise/fatigue.   HENT: Negative for congestion and hearing loss.    Cardiovascular: Negative  for chest pain, claudication and leg swelling.   Respiratory: Negative for cough and shortness of breath.    Skin: Negative for color change and poor wound healing.        Callus left foot   Musculoskeletal: Positive for muscle cramps. Negative for back pain, joint pain and muscle weakness.   Gastrointestinal: Negative for nausea and vomiting.   Neurological: Positive for paresthesias. Negative for numbness and weakness.   Psychiatric/Behavioral: Negative for altered mental status.           Objective:      Physical Exam  Vitals reviewed.   Constitutional:       General: She is not in acute distress.     Appearance: Normal appearance. She is not ill-appearing.   Cardiovascular:      Pulses:           Dorsalis pedis pulses are 2+ on the right side and 2+ on the left side.        Posterior tibial pulses are 2+ on the right side and 2+ on the left side.      Comments: Toes and feet are warm to touch proximal to distal b/l. There is normal hair growth distribution on the feet and toes b/l. There is no edema b/l. No spider veins or varicosities present b/l.     Musculoskeletal:      Comments: Prominent plantar 3rd and 4th metatarsal heads with mild overlying callus mostly over the 3rd metatarsal.  Mild localized pain on palpation to this area.  There is pain on palpation overlying the distal 1/2 of the 5th metatarsal shaft.  No pain 5th toe range of motion.  Reducible flexion contractures of toes 2-4 left foot.    Cavus foot structure bilateral.  No pain on palpation to the dorsal left midfoot.   Skin:     General: Skin is warm.      Capillary Refill: Capillary refill takes less than 2 seconds.      Findings: No ecchymosis or erythema.      Nails: There is no clubbing.      Comments: Normal appearing scars to surgical sites left foot.  Tender callus plantar left 3rd metatarsal head   Neurological:      Mental Status: She is alert and oriented to person, place, and time.      Sensory: Sensation is intact.      Motor:  Motor function is intact.      Comments: +tinel's sign to the deep peroneal nerve left foot                   Assessment:       Encounter Diagnoses   Name Primary?    Pain from implanted hardware, subsequent encounter Yes    Left foot pain     Plantar flexed metatarsal bone of left foot     Corn or callus     Preop testing          Plan:       Alyssa was seen today for follow-up.    Diagnoses and all orders for this visit:    Pain from implanted hardware, subsequent encounter  -     Ambulatory referral/consult to Family Practice; Future  -     Case Request Operating Room: REMOVAL, HARDWARE, FOOT  -     Full code; Standing  -     Insert peripheral IV; Standing  -     Full code  -     Insert peripheral IV    Left foot pain    Plantar flexed metatarsal bone of left foot    Corn or callus    Preop testing  -     Ambulatory referral/consult to Family Practice; Future  -     CBC auto differential; Future  -     Basic Metabolic Panel; Future    Other orders  -     sodium chloride 0.9% flush 10 mL  The following orders have not been finalized:  -     ceFAZolin (ANCEF) 2 g in dextrose 5 % 50 mL IVPB      I counseled the patient on her conditions, their implications and medical management.    We discussed continued conservative care versus surgical intervention consisting hardware removal to the left 5th metatarsal.  It appears as though patient has Arthrex Mini fragment plate that will require removal.  We discussed risks, benefits anticipate postop course in detail.  No guarantees were given or implied.  Patient elected to proceed with surgical intervention outlined above.    This procedure has been fully reviewed with the patient and written informed consent has been obtained.    Referred to PCP for medical optimization and risk stratification    Scheduled for surgical intervention on 06/08/2022 as mac with local anesthesia.    Offloading metatarsal pad fabricated for the left 3rd plantar metatarsal.  As a courtesy a  sterile #15 scalpel used to trim the raised hyperkeratotic skin inspect underlying skin which appeared normal.  She tolerated procedure well without apparent complication.    Inspected her shoes which appear to fit adequately and offer adequate support.  Also examined her feet noting at the left foot is slightly longer and wider compared to the right foot.  There also appears to be clinically a small lower limb length difference with the left side being longer than the right.    RTC within 1 week surgery or p.r.n. as discussed    Assisted by Best Pacheco DPM PGY 2    A portion of this note was generated by voice recognition software and may contain spelling and grammar errors.    H&P completed on 03/17/2022 has been reviewed, the patient has been examined and:  I concur with the findings and no changes have occurred since H&P was written.    There are no hospital problems to display for this patient.        .

## 2022-06-08 NOTE — ANESTHESIA POSTPROCEDURE EVALUATION
Anesthesia Post Evaluation    Patient: Alyssa Robledo    Procedure(s) Performed: Procedure(s) (LRB):  REMOVAL, HARDWARE, FOOT (Left)    Final Anesthesia Type: MAC      Patient location during evaluation: [facility]  Patient participation: Yes- Able to Participate  Level of consciousness: awake and alert and oriented  Post-procedure vital signs: reviewed and stable  Pain management: adequate  Airway patency: patent    PONV status at discharge: No PONV  Anesthetic complications: no      Cardiovascular status: blood pressure returned to baseline, hemodynamically stable and stable  Respiratory status: room air, unassisted and spontaneous ventilation  Hydration status: euvolemic  Follow-up not needed.          Vitals Value Taken Time   /62 06/08/22 1208   Temp 36.7 06/08/22 1208   Pulse 74 06/08/22 1208   Resp 22 06/08/22 1208   SpO2 99 06/08/22 1208         No case tracking events are documented in the log.      Pain/Jacoby Score: No data recorded

## 2022-06-16 ENCOUNTER — OFFICE VISIT (OUTPATIENT)
Dept: PODIATRY | Facility: CLINIC | Age: 42
End: 2022-06-16
Payer: COMMERCIAL

## 2022-06-16 VITALS
DIASTOLIC BLOOD PRESSURE: 98 MMHG | SYSTOLIC BLOOD PRESSURE: 156 MMHG | HEIGHT: 69 IN | WEIGHT: 205 LBS | BODY MASS INDEX: 30.36 KG/M2 | HEART RATE: 73 BPM

## 2022-06-16 DIAGNOSIS — Z98.890 POSTOPERATIVE STATE: Primary | ICD-10-CM

## 2022-06-16 PROCEDURE — 3008F BODY MASS INDEX DOCD: CPT | Mod: CPTII,S$GLB,, | Performed by: PODIATRIST

## 2022-06-16 PROCEDURE — 1160F RVW MEDS BY RX/DR IN RCRD: CPT | Mod: CPTII,S$GLB,, | Performed by: PODIATRIST

## 2022-06-16 PROCEDURE — 99999 PR PBB SHADOW E&M-EST. PATIENT-LVL III: ICD-10-PCS | Mod: PBBFAC,,, | Performed by: PODIATRIST

## 2022-06-16 PROCEDURE — 1159F PR MEDICATION LIST DOCUMENTED IN MEDICAL RECORD: ICD-10-PCS | Mod: CPTII,S$GLB,, | Performed by: PODIATRIST

## 2022-06-16 PROCEDURE — 3008F PR BODY MASS INDEX (BMI) DOCUMENTED: ICD-10-PCS | Mod: CPTII,S$GLB,, | Performed by: PODIATRIST

## 2022-06-16 PROCEDURE — 99999 PR PBB SHADOW E&M-EST. PATIENT-LVL III: CPT | Mod: PBBFAC,,, | Performed by: PODIATRIST

## 2022-06-16 PROCEDURE — 1160F PR REVIEW ALL MEDS BY PRESCRIBER/CLIN PHARMACIST DOCUMENTED: ICD-10-PCS | Mod: CPTII,S$GLB,, | Performed by: PODIATRIST

## 2022-06-16 PROCEDURE — 99024 POSTOP FOLLOW-UP VISIT: CPT | Mod: S$GLB,,, | Performed by: PODIATRIST

## 2022-06-16 PROCEDURE — 1159F MED LIST DOCD IN RCRD: CPT | Mod: CPTII,S$GLB,, | Performed by: PODIATRIST

## 2022-06-16 PROCEDURE — 99024 PR POST-OP FOLLOW-UP VISIT: ICD-10-PCS | Mod: S$GLB,,, | Performed by: PODIATRIST

## 2022-06-16 PROCEDURE — 3077F PR MOST RECENT SYSTOLIC BLOOD PRESSURE >= 140 MM HG: ICD-10-PCS | Mod: CPTII,S$GLB,, | Performed by: PODIATRIST

## 2022-06-16 PROCEDURE — 3080F DIAST BP >= 90 MM HG: CPT | Mod: CPTII,S$GLB,, | Performed by: PODIATRIST

## 2022-06-16 PROCEDURE — 3077F SYST BP >= 140 MM HG: CPT | Mod: CPTII,S$GLB,, | Performed by: PODIATRIST

## 2022-06-16 PROCEDURE — 3080F PR MOST RECENT DIASTOLIC BLOOD PRESSURE >= 90 MM HG: ICD-10-PCS | Mod: CPTII,S$GLB,, | Performed by: PODIATRIST

## 2022-06-23 ENCOUNTER — OFFICE VISIT (OUTPATIENT)
Dept: PODIATRY | Facility: CLINIC | Age: 42
End: 2022-06-23
Payer: COMMERCIAL

## 2022-06-23 VITALS
DIASTOLIC BLOOD PRESSURE: 93 MMHG | HEIGHT: 69 IN | BODY MASS INDEX: 30.28 KG/M2 | HEART RATE: 75 BPM | SYSTOLIC BLOOD PRESSURE: 143 MMHG

## 2022-06-23 DIAGNOSIS — Z98.890 POSTOPERATIVE STATE: Primary | ICD-10-CM

## 2022-06-23 PROCEDURE — 1159F MED LIST DOCD IN RCRD: CPT | Mod: CPTII,S$GLB,, | Performed by: PODIATRIST

## 2022-06-23 PROCEDURE — 99999 PR PBB SHADOW E&M-EST. PATIENT-LVL III: ICD-10-PCS | Mod: PBBFAC,,, | Performed by: PODIATRIST

## 2022-06-23 PROCEDURE — 3080F PR MOST RECENT DIASTOLIC BLOOD PRESSURE >= 90 MM HG: ICD-10-PCS | Mod: CPTII,S$GLB,, | Performed by: PODIATRIST

## 2022-06-23 PROCEDURE — 3077F SYST BP >= 140 MM HG: CPT | Mod: CPTII,S$GLB,, | Performed by: PODIATRIST

## 2022-06-23 PROCEDURE — 3077F PR MOST RECENT SYSTOLIC BLOOD PRESSURE >= 140 MM HG: ICD-10-PCS | Mod: CPTII,S$GLB,, | Performed by: PODIATRIST

## 2022-06-23 PROCEDURE — 1160F RVW MEDS BY RX/DR IN RCRD: CPT | Mod: CPTII,S$GLB,, | Performed by: PODIATRIST

## 2022-06-23 PROCEDURE — 1160F PR REVIEW ALL MEDS BY PRESCRIBER/CLIN PHARMACIST DOCUMENTED: ICD-10-PCS | Mod: CPTII,S$GLB,, | Performed by: PODIATRIST

## 2022-06-23 PROCEDURE — 3080F DIAST BP >= 90 MM HG: CPT | Mod: CPTII,S$GLB,, | Performed by: PODIATRIST

## 2022-06-23 PROCEDURE — 99024 POSTOP FOLLOW-UP VISIT: CPT | Mod: S$GLB,,, | Performed by: PODIATRIST

## 2022-06-23 PROCEDURE — 1159F PR MEDICATION LIST DOCUMENTED IN MEDICAL RECORD: ICD-10-PCS | Mod: CPTII,S$GLB,, | Performed by: PODIATRIST

## 2022-06-23 PROCEDURE — 99999 PR PBB SHADOW E&M-EST. PATIENT-LVL III: CPT | Mod: PBBFAC,,, | Performed by: PODIATRIST

## 2022-06-23 PROCEDURE — 99024 PR POST-OP FOLLOW-UP VISIT: ICD-10-PCS | Mod: S$GLB,,, | Performed by: PODIATRIST

## 2022-06-23 PROCEDURE — 3008F BODY MASS INDEX DOCD: CPT | Mod: CPTII,S$GLB,, | Performed by: PODIATRIST

## 2022-06-23 PROCEDURE — 3008F PR BODY MASS INDEX (BMI) DOCUMENTED: ICD-10-PCS | Mod: CPTII,S$GLB,, | Performed by: PODIATRIST

## 2022-06-23 NOTE — PROGRESS NOTES
Subjective:      Patient ID: Alyssa Robledo is a 41 y.o. female.    Chief Complaint: Post-op Evaluation and Follow-up    Presents today complaining of persistent pain to the left foot after breaking her left 5th metatarsal at [employer] football game a year ago.  Relates that she gets pins and needles tingling to the top of the foot that radiates along the 1st and 2nd toes that is worsened when she flexes the foot down.  Also gets some moderate amount of pain underneath the ball of the left foot when she stands or walks for extended periods of time.  Pain is alleviated by rest.  She showed me an old x-ray which showed a 5th metatarsal oblique neck fracture that was significantly displaced on AP view.    10/01/2020:  Follow-up for left foot pain.  She has been wearing a silicone pad underneath the ball of foot however this seems to increase her pain.  She has been resorting to a Darco shoe and instead of tennis shoe.    11/12/2020:  Post external neurolysis of the deep peroneal nerve and dorsal medial cutaneous nerves with extensor hallucis brevis tenotomy, percutaneous flexor tenotomies toes 3 and 4, rotational osteotomy the 4th metatarsal capital fragment and osteotomy of malunited 5th metatarsal fracture left foot on 11/06/2020.  Relates only discomfort when she applies weight on the foot.  She stopped taking pain medication 4 days ago.    12/07/2020:  1 month postop.  Relates that the exposed K-wire is catching on things and is bothering her.  Otherwise she has no pain.  She is heel weight-bearing with crutches and a orthopedic boot.    1/04/2021:  2 months postop.  Relates that she has mild pain however she is on her foot for extended periods of time it becomes moderate.  She still wearing the orthopedic boot.  Denies any slips, trips or falls.  Inquiring about return to work status.    2/1/2021: 11 weeks  post op above procedure. 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 Relates to discomfort on the top of her left foot, especially at night. States her bedsheet making contact with her left foot wakes her up in the middle of the night. Also relates to pain in the left MTPJs, new onset, states she was running after a student at work last week and pain started afterwards. Denies tripping, falling.    03/08/2021:  Follow-up for pain along the top of the left foot.  States that she is unable to take gabapentin during the day however has been taking at night.  States that makes her too tired.  Relates that she has some numbness along the top of the left foot that will radiate in between the 1st and 2nd toes as well proximally up the leg.  She does not have any pain along the bottom of the left foot while wearing shoes however when she walks barefoot she does have discomfort.    01/20/2022:  Complains of intermittent pain to left foot specially of weather changes.  Walking barefoot aggravates pain underneath the ball the foot.  She feels as though she can feel the retained hardware in the foot.  She works as a school nurse.  Pain mostly described as aching sensation.    03/17/2022: Returns complaining of painful retained hardware left 5th metatarsal and painful left plantar foot callus. She is requesting removal of left foot hardware this Summer. She is wearing new athletic shoes. She has attempted a metatarsal pad for the left foot before but could not tolerate it.     06/16/2022:  Post hardware removal left foot on 06/08/2022.  Relates no left foot pain.  No new concerns.    06/23/2022:  Nearly 3 weeks postop.  No new concerns although she does ask about her toes 2-4 on the left.  She has a painful callus on bottom the left forefoot.    Vitals:    06/23/22 0836   BP: (!) 143/93   Pulse: 75   Height: 5' 9" (1.753 m)   PainSc: 0-No pain   PainLoc: Foot      Past Medical History:   Diagnosis Date    Anxiety     Closed fracture of head of " metatarsal, left, with malunion, subsequent encounter 2020    GERD (gastroesophageal reflux disease)        Past Surgical History:   Procedure Laterality Date     SECTION      2    CORRECTION OF HAMMER TOE Left 2020    Procedure: CORRECTION, HAMMER TOE;  Surgeon: Ha Goetz DPM;  Location: [facility] OR;  Service: Podiatry;  Laterality: Left;  mini c-arm, Arthrex toe implant, toes 3,4Mallory confirmed 2020 0745 KB    DILATION AND CURETTAGE OF UTERUS      FOOT HARDWARE REMOVAL Left 2022    Procedure: REMOVAL, HARDWARE, FOOT;  Surgeon: Ha Goetz DPM;  Location: [facility] OR;  Service: Podiatry;  Laterality: Left;    OSTEOTOMY OF METATARSAL BONE Left 2020    Procedure: OSTEOTOMY, METATARSAL BONE;  Surgeon: Ha Goetz DPM;  Location: [facility] OR;  Service: Podiatry;  Laterality: Left;  Arthrex screws, dorsal exostectomy foot/deep peroneal nerve release included (Cuca notified 10/26, EF)       Family History   Problem Relation Age of Onset    No Known Problems Mother     No Known Problems Father     Breast cancer Maternal Grandmother     Heart disease Neg Hx     Cancer Neg Hx        Social History     Socioeconomic History    Marital status:    Occupational History     Employer: [employer]   Tobacco Use    Smoking status: Never Smoker    Smokeless tobacco: Never Used   Substance and Sexual Activity    Alcohol use: Yes     Alcohol/week: 0.8 standard drinks     Types: 1 Standard drinks or equivalent per week     Comment: 3     Drug use: No    Sexual activity: Yes     Partners: Male   Social History Narrative    Live in [place] with  and 2 daughters ( 12 yo and 7yo). 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 Non-smoker. No exposure to blood; No risk for STD's. Socially drinks alcohol;  is employed by [employer] as a . 2021  and youngest daughter had covid-19 and did well.                      Current  Outpatient Medications   Medication Sig Dispense Refill    butalbital-acetaminophen-caffeine -40 mg (FIORICET, ESGIC) -40 mg per tablet Take 1 tablet by mouth every 4 (four) hours as needed for Pain.      cetirizine (ZYRTEC) 10 MG tablet Take 10 mg by mouth once daily.      chlorthalidone (HYGROTEN) 50 MG Tab Take 1 tablet (50 mg total) by mouth once daily. 90 tablet 3    esomeprazole (NEXIUM) 20 MG capsule Take 20 mg by mouth.      FLUoxetine 40 MG capsule TAKE 1 CAPSULE BY MOUTH ONCE DAILY 90 capsule 4    ibuprofen (ADVIL,MOTRIN) 800 MG tablet Take 1 tablet (800 mg total) by mouth every 6 (six) hours as needed for Pain. 30 tablet 1    Lactobacillus acidophilus 10 billion cell Cap Take 1 tablet by mouth once daily.      OCELLA 3-0.03 mg per tablet       potassium chloride SA (K-DUR,KLOR-CON) 20 MEQ tablet TAKE ONE TABLET BY MOUTH 2 TIMES A  tablet 2    traMADoL (ULTRAM) 50 mg tablet Take 1 tablet (50 mg total) by mouth every 6 (six) hours as needed for Pain. 20 tablet 0     Current Facility-Administered Medications   Medication Dose Route Frequency Provider Last Rate Last Admin    sodium chloride 0.9% flush 10 mL  10 mL Intravenous PRN Ha Goetz DPM        sodium chloride 0.9% flush 10 mL  10 mL Intravenous PRN Ha Goetz DPM           Review of patient's allergies indicates:   Allergen Reactions    Phenergan [promethazine] Hives         Review of Systems   Constitutional: Negative for chills, fever and malaise/fatigue.   HENT: Negative for congestion and hearing loss.    Cardiovascular: Negative for chest pain, claudication and leg swelling.   Respiratory: Negative for cough and shortness of breath.    Skin: Negative for color change and poor wound healing.        Callus left foot   Musculoskeletal: Positive for muscle cramps. Negative for back pain, joint pain and muscle weakness.   Gastrointestinal: Negative for nausea and vomiting.   Neurological: Positive for  paresthesias. Negative for numbness and weakness.   Psychiatric/Behavioral: Negative for altered mental status.           Objective:      Physical Exam  Vitals reviewed.   Constitutional:       General: She is not in acute distress.     Appearance: Normal appearance. She is not ill-appearing.   Cardiovascular:      Pulses:           Dorsalis pedis pulses are 2+ on the right side and 2+ on the left side.        Posterior tibial pulses are 2+ on the right side and 2+ on the left side.      Comments: Toes and feet are warm to touch proximal to distal b/l. There is normal hair growth distribution on the feet and toes b/l. There is no edema b/l. No spider veins or varicosities present b/l.     Musculoskeletal:      Comments: No localized pain on palpation to surgical site dorsal left 5th metatarsal region.  No palpable fluctuance or crepitance.   Skin:     General: Skin is warm.      Capillary Refill: Capillary refill takes less than 2 seconds.      Findings: No ecchymosis or erythema.      Nails: There is no clubbing.      Comments: Incision site dorsal 5th met left foot well-approximated sutures and healed.  No signs infection.   Neurological:      Mental Status: She is alert and oriented to person, place, and time.      Sensory: Sensation is intact.      Motor: Motor function is intact.      Comments: +tinel's sign to the deep peroneal nerve left foot                   Assessment:       Encounter Diagnosis   Name Primary?    Postoperative state Yes         Plan:       Alyssa was seen today for post-op evaluation and follow-up.    Diagnoses and all orders for this visit:    Postoperative state      I counseled the patient on her conditions, their implications and medical management.    Sutures removed.  New scar beginning the form.  Activity as tolerated.    We briefly discussed the etiology of her hammertoe/clawtoe deformities or beginning which have apparent extensor substitution.  Crest pad dispensed.  We briefly  discussed surgical intervention consisting of PIPJ arthrodesis with extensor tendon lengthening.    RTC p.r.n. as discussed.    A portion of this note was generated by voice recognition software and may contain spelling and grammar errors.        .

## 2022-08-24 ENCOUNTER — PATIENT MESSAGE (OUTPATIENT)
Dept: ADMINISTRATIVE | Facility: HOSPITAL | Age: 42
End: 2022-08-24
Payer: COMMERCIAL

## 2022-09-09 ENCOUNTER — CLINICAL SUPPORT (OUTPATIENT)
Dept: OTHER | Facility: CLINIC | Age: 42
End: 2022-09-09
Payer: COMMERCIAL

## 2022-09-09 DIAGNOSIS — Z00.8 ENCOUNTER FOR OTHER GENERAL EXAMINATION: ICD-10-CM

## 2022-09-10 VITALS
BODY MASS INDEX: 30.81 KG/M2 | HEIGHT: 69 IN | SYSTOLIC BLOOD PRESSURE: 134 MMHG | WEIGHT: 208 LBS | DIASTOLIC BLOOD PRESSURE: 80 MMHG

## 2022-09-10 LAB
GLUCOSE SERPL-MCNC: 109 MG/DL (ref 60–140)
HDLC SERPL-MCNC: 63 MG/DL
POC CHOLESTEROL, LDL (DOCK): 124 MG/DL
POC CHOLESTEROL, TOTAL: 225 MG/DL
TRIGL SERPL-MCNC: 220 MG/DL

## 2022-10-10 ENCOUNTER — PATIENT MESSAGE (OUTPATIENT)
Dept: ADMINISTRATIVE | Facility: HOSPITAL | Age: 42
End: 2022-10-10
Payer: COMMERCIAL

## 2022-10-13 NOTE — TELEPHONE ENCOUNTER
Spoke with pt, scheduled her pre-op 10/29/20.    Cyclophosphamide Counseling:  I discussed with the patient the risks of cyclophosphamide including but not limited to hair loss, hormonal abnormalities, decreased fertility, abdominal pain, diarrhea, nausea and vomiting, bone marrow suppression and infection. The patient understands that monitoring is required while taking this medication.

## 2022-11-17 DIAGNOSIS — F41.1 GENERALIZED ANXIETY DISORDER: ICD-10-CM

## 2022-11-17 RX ORDER — FLUOXETINE HYDROCHLORIDE 40 MG/1
CAPSULE ORAL
Qty: 90 CAPSULE | Refills: 1 | Status: SHIPPED | OUTPATIENT
Start: 2022-11-17 | End: 2023-05-18

## 2022-11-17 NOTE — TELEPHONE ENCOUNTER
Refill Decision Note   Alyssa Robledo  is requesting a refill authorization.  Brief Assessment and Rationale for Refill:  Approve     Medication Therapy Plan:       Medication Reconciliation Completed: No   Comments:     No Care Gaps recommended.     Note composed:12:58 PM 11/17/2022

## 2022-11-17 NOTE — TELEPHONE ENCOUNTER
No new care gaps identified.  [facility] Embedded Care Gaps. Reference number: [id number]. 11/17/2022   6:59:46 AM CST

## 2022-12-01 ENCOUNTER — PATIENT MESSAGE (OUTPATIENT)
Dept: FAMILY MEDICINE | Facility: CLINIC | Age: 42
End: 2022-12-01
Payer: COMMERCIAL

## 2022-12-01 DIAGNOSIS — R53.83 FATIGUE, UNSPECIFIED TYPE: Primary | ICD-10-CM

## 2022-12-02 ENCOUNTER — PATIENT MESSAGE (OUTPATIENT)
Dept: FAMILY MEDICINE | Facility: CLINIC | Age: 42
End: 2022-12-02
Payer: COMMERCIAL

## 2022-12-20 PROBLEM — Z01.818 PREOPERATIVE EXAMINATION: Status: RESOLVED | Noted: 2020-10-29 | Resolved: 2022-12-20

## 2022-12-20 NOTE — PROGRESS NOTES
FAMILY MEDICINE  [facility]    Reason for visit:   Chief Complaint   Patient presents with    Annual Exam         SUBJECTIVE: Alyssa Robledo is a 42 y.o. female  - with obesity, anxiety and hypertension presents for her routine annual physical     Gynecology Dr. Renata MCDONALD    Today she reports that she is doing well.     1. Hypertension  - none  - BP goal < 130/80    Current medication treatment:   chlorthalidone (HYGROTEN) 50 MG Tab, Take 1 tablet (50 mg total) by mouth once daily., Disp: 90 tablet, Rfl: 0    Medication side effects: hypokalemia on KCl  taking medications as instructed, no medication side effects noted, no TIAs, no chest pain on exertion, no dyspnea on exertion, no swelling of ankles    Home BP cuff: Yes  How often does patient monitoring BP? Daily     BMP  Lab Results       Component                Value               Date                       NA                       137                 12/16/2022                 K                        3.3 (L)             12/16/2022                 CL                       98                  12/16/2022                 CO2                      27                  12/16/2022                 BUN                      17                  12/16/2022                 CREATININE               0.63                12/16/2022                 CALCIUM                  9.2                 12/16/2022                 ANIONGAP                 12                  12/16/2022                 EGFRNORACEVR             >60.0               12/16/2022                  2. Anxiety and depression    Age diagnosed:  32 years old postpartum depression and anxiety     Today 12/21/22: She reports well controlled    Prior notes:    12/21/21: She reports that doing well and no acute issues. Stable on medication   6/8/2021:  Reports doing well and medication was increased last year during Covid-19 due to increased stressors. She reports her  is changing jobs soon which will mean  a lot of change and she does not want to decrease her dosing yet. She is pleased with this dose  2020 worsening depression anxiety medication fluoxetine increased from 20 mg to 40 mg.  She had previously been on 40 mg and had a decrease 2019 when she was doing well.    Panic attacks: denies  Hopelessness:  denies  Sleep issues:  denies  Suicidal thoughts:  denies  Thoughts of self harm: denies  Thoughts of harm to others:  denies  History of suicide attempts:  denies  Family history of suicide: denies    Psychiatrist: none  Psychologist: none  Counselor : none    Prior medication: NA    Current medications:   FLUoxetine 40 MG capsule, TAKE 1 CAPSULE BY MOUTH ONCE DAILY, Disp: 90 capsule, Rfl: 1    Side effects of current treatment: None    Support system: family and friends            Review of Systems   All other systems reviewed and are negative.    HEALTH MAINTENANCE:   Health Maintenance   Topic Date Due    TETANUS VACCINE  Never done    Mammogram  2023    Hepatitis C Screening  Completed    Lipid Panel  Completed     Health Maintenance Topics with due status: Not Due       Topic Last Completion Date    Cervical Cancer Screening 2022    Mammogram 2022    Hemoglobin A1c (Prediabetes) 2022     Health Maintenance Due   Topic Date Due    COVID-19 Vaccine (1) Never done    TETANUS VACCINE  Never done       HISTORY:   Past Medical History:   Diagnosis Date    Anxiety     Closed fracture of head of metatarsal, left, with malunion, subsequent encounter 2020    GERD (gastroesophageal reflux disease)     Pain from implanted hardware 2022       Past Surgical History:   Procedure Laterality Date     SECTION      2    CORRECTION OF HAMMER TOE Left 2020    Procedure: CORRECTION, HAMMER TOE;  Surgeon: Ha Goetz DPM;  Location: [facility];  Service: Podiatry;  Laterality: Left;  mini c-arm, Arthrex toe implant, toes 3,4Mallory confirmed 2020 0745 KB    DILATION AND  CURETTAGE OF UTERUS      FOOT HARDWARE REMOVAL Left 6/8/2022    Procedure: REMOVAL, HARDWARE, FOOT;  Surgeon: Ha Goetz DPM;  Location: [facility] OR;  Service: Podiatry;  Laterality: Left;    OSTEOTOMY OF METATARSAL BONE Left 11/6/2020    Procedure: OSTEOTOMY, METATARSAL BONE;  Surgeon: Ha Goetz DPM;  Location: [facility] OR;  Service: Podiatry;  Laterality: Left;  Arthrex screws, dorsal exostectomy foot/deep peroneal nerve release included (Cuca notified 10/26, EF)       Family History   Problem Relation Age of Onset    No Known Problems Mother     No Known Problems Father     Breast cancer Maternal Grandmother     Heart disease Neg Hx     Cancer Neg Hx        Social History     Tobacco Use    Smoking status: Never    Smokeless tobacco: Never   Substance Use Topics    Alcohol use: Yes     Alcohol/week: 0.8 standard drinks     Types: 1 Standard drinks or equivalent per week     Comment: 3     Drug use: No       Social History     Social History Narrative    Live in [place] with  and 2 daughters (2021 14 yo and 7yo). 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 Non-smoker. No exposure to blood; No risk for STD's. Socially drinks alcohol;  is employed by [employer] as a . 8/2021  and youngest daughter had covid-19 and did well.                      ALLERGIES:   Review of patient's allergies indicates:   Allergen Reactions    Phenergan [promethazine] Hives       MEDS:     Current Outpatient Medications:     butalbital-acetaminophen-caffeine -40 mg (FIORICET, ESGIC) -40 mg per tablet, Take 1 tablet by mouth every 4 (four) hours as needed for Pain., Disp: , Rfl:     cetirizine (ZYRTEC) 10 MG tablet, Take 10 mg by mouth once daily., Disp: , Rfl:     esomeprazole (NEXIUM) 20 MG capsule, Take 20 mg by mouth., Disp: , Rfl:     FLUoxetine 40 MG capsule, TAKE 1 CAPSULE BY MOUTH ONCE DAILY, Disp: 90 capsule, Rfl: 1    ibuprofen (ADVIL,MOTRIN) 800 MG tablet, Take 1 tablet (800  "mg total) by mouth every 6 (six) hours as needed for Pain., Disp: 30 tablet, Rfl: 1    Lactobacillus acidophilus 10 billion cell Cap, Take 1 tablet by mouth once daily., Disp: , Rfl:     OCELLA 3-0.03 mg per tablet, , Disp: , Rfl:     traMADoL (ULTRAM) 50 mg tablet, Take 1 tablet (50 mg total) by mouth every 6 (six) hours as needed for Pain., Disp: 20 tablet, Rfl: 0    spironolactone (ALDACTONE) 50 MG tablet, Take 1 tablet (50 mg total) by mouth once daily., Disp: 90 tablet, Rfl: 3    Current Facility-Administered Medications:     sodium chloride 0.9% flush 10 mL, 10 mL, Intravenous, PRN, Ha Goetz, DPM    sodium chloride 0.9% flush 10 mL, 10 mL, Intravenous, PRN, Ha Goetz, DPM      Vital signs:   Vitals:    12/21/22 0914   BP: 120/80   BP Location: Left arm   Patient Position: Sitting   BP Method: Large (Manual)   Pulse: 86   SpO2: 100%   Weight: 94.1 kg (207 lb 8 oz)   Height: 5' 9" (1.753 m)     Body mass index is 30.64 kg/m².  PHYSICAL EXAM:     Physical Exam  Vitals reviewed.   Constitutional:       General: She is not in acute distress.  HENT:      Head: Normocephalic and atraumatic.      Right Ear: Tympanic membrane and ear canal normal.      Left Ear: Tympanic membrane and ear canal normal.   Eyes:      General: No scleral icterus.     Conjunctiva/sclera: Conjunctivae normal.   Neck:      Vascular: No carotid bruit.   Cardiovascular:      Rate and Rhythm: Normal rate and regular rhythm.      Pulses: Normal pulses.      Heart sounds: Normal heart sounds. No murmur heard.    No friction rub. No gallop.   Pulmonary:      Effort: Pulmonary effort is normal.      Breath sounds: Normal breath sounds. No wheezing or rales.   Abdominal:      General: Bowel sounds are normal. There is no distension.      Palpations: Abdomen is soft.      Tenderness: There is no abdominal tenderness.   Musculoskeletal:      Cervical back: Normal range of motion and neck supple.      Right lower leg: No edema.      " Left lower leg: No edema.   Skin:     General: Skin is warm.      Capillary Refill: Capillary refill takes less than 2 seconds.   Neurological:      Mental Status: She is alert.         PHQ4 = No data recorded    PERTINENT RESULTS:   Lab Visit on 12/16/2022   Component Date Value Ref Range Status    WBC 12/16/2022 8.14  3.90 - 12.70 K/uL Final    RBC 12/16/2022 4.69  4.00 - 5.40 M/uL Final    Hemoglobin 12/16/2022 13.6  12.0 - 16.0 g/dL Final    Hematocrit 12/16/2022 40.9  37.0 - 48.5 % Final    MCV 12/16/2022 87  82 - 98 fL Final    MCH 12/16/2022 29.0  27.0 - 31.0 pg Final    MCHC 12/16/2022 33.3  32.0 - 36.0 g/dL Final    RDW 12/16/2022 13.2  11.5 - 14.5 % Final    Platelets 12/16/2022 298  150 - 450 K/uL Final    MPV 12/16/2022 11.1  9.2 - 12.9 fL Final    Immature Granulocytes 12/16/2022 0.2  0.0 - 0.5 % Final    Gran # (ANC) 12/16/2022 5.5  1.8 - 7.7 K/uL Final    Immature Grans (Abs) 12/16/2022 0.02  0.00 - 0.04 K/uL Final    Comment: Mild elevation in immature granulocytes is non specific and   can be seen in a variety of conditions including stress response,   acute inflammation, trauma and pregnancy. Correlation with other   laboratory and clinical findings is essential.      Lymph # 12/16/2022 1.7  1.0 - 4.8 K/uL Final    Mono # 12/16/2022 0.7  0.3 - 1.0 K/uL Final    Eos # 12/16/2022 0.1  0.0 - 0.5 K/uL Final    Baso # 12/16/2022 0.07  0.00 - 0.20 K/uL Final    nRBC 12/16/2022 0  0 /100 WBC Final    Gran % 12/16/2022 67.9  38.0 - 73.0 % Final    Lymph % 12/16/2022 21.4  18.0 - 48.0 % Final    Mono % 12/16/2022 8.6  4.0 - 15.0 % Final    Eosinophil % 12/16/2022 1.0  0.0 - 8.0 % Final    Basophil % 12/16/2022 0.9  0.0 - 1.9 % Final    Differential Method 12/16/2022 Automated   Final    Sodium 12/16/2022 137  136 - 145 mmol/L Final    Potassium 12/16/2022 3.3 (L)  3.5 - 5.1 mmol/L Final    Chloride 12/16/2022 98  95 - 110 mmol/L Final    CO2 12/16/2022 27  23 - 29 mmol/L Final    Glucose 12/16/2022 114 (H)   70 - 110 mg/dL Final    BUN 12/16/2022 17  7 - 17 mg/dL Final    Creatinine 12/16/2022 0.63  0.50 - 1.40 mg/dL Final    Calcium 12/16/2022 9.2  8.7 - 10.5 mg/dL Final    Total Protein 12/16/2022 8.1  6.0 - 8.4 g/dL Final    Albumin 12/16/2022 4.4  3.5 - 5.2 g/dL Final    Total Bilirubin 12/16/2022 0.5  0.1 - 1.0 mg/dL Final    Comment: For infants and newborns, interpretation of results should be based  on gestational age, weight and in agreement with clinical  observations.    Premature Infant recommended reference ranges:  Up to 24 hours.............<8.0 mg/dL  Up to 48 hours............<12.0 mg/dL  3-5 days..................<15.0 mg/dL  6-29 days.................<15.0 mg/dL      Alkaline Phosphatase 12/16/2022 109  38 - 126 U/L Final    AST 12/16/2022 33  15 - 46 U/L Final    ALT 12/16/2022 29  10 - 44 U/L Final    Anion Gap 12/16/2022 12  8 - 16 mmol/L Final    eGFR 12/16/2022 >60.0  >60 mL/min/1.73 m^2 Final    Cholesterol 12/16/2022 210 (H)  120 - 199 mg/dL Final    Comment: The National Cholesterol Education Program (NCEP) has set the  following guidelines (reference ranges) for Cholesterol:  Optimal.....................<200 mg/dL  Borderline High.............200-239 mg/dL  High........................> or = 240 mg/dL      Triglycerides 12/16/2022 101  30 - 150 mg/dL Final    Comment: The National Cholesterol Education Program (NCEP) has set the  following guidelines (reference values) for triglycerides:  Normal......................<150 mg/dL  Borderline High.............150-199 mg/dL  High........................200-499 mg/dL      HDL 12/16/2022 70  40 - 75 mg/dL Final    Comment: The National Cholesterol Education Program (NCEP) has set the  following guidelines (reference values) for HDL Cholesterol:  Low...............<40 mg/dL  Optimal...........>60 mg/dL      LDL Cholesterol 12/16/2022 119.8  63.0 - 159.0 mg/dL Final    Comment: The National Cholesterol Education Program (NCEP) has set the  following  guidelines (reference values) for LDL Cholesterol:  Optimal.......................<130 mg/dL  Borderline High...............130-159 mg/dL  High..........................160-189 mg/dL  Very High.....................>190 mg/dL      HDL/Cholesterol Ratio 12/16/2022 33.3  20.0 - 50.0 % Final    Total Cholesterol/HDL Ratio 12/16/2022 3.0  2.0 - 5.0 Final    Non-HDL Cholesterol 12/16/2022 140  mg/dL Final    Comment: Risk category and Non-HDL cholesterol goals:  Coronary heart disease (CHD)or equivalent (10-year risk of CHD >20%):  Non-HDL cholesterol goal     <130 mg/dL  Two or more CHD risk factors and 10-year risk of CHD <= 20%:  Non-HDL cholesterol goal     <160 mg/dL  0 to 1 CHD risk factor:  Non-HDL cholesterol goal     <190 mg/dL      Hemoglobin A1C 12/16/2022 5.5  4.0 - 5.6 % Final    Comment: ADA Screening Guidelines:  5.7-6.4%  Consistent with prediabetes  >or=6.5%  Consistent with diabetes    High levels of fetal hemoglobin interfere with the HbA1C  assay. Heterozygous hemoglobin variants (HbS, HgC, etc)do  not significantly interfere with this assay.   However, presence of multiple variants may affect accuracy.      Estimated Avg Glucose 12/16/2022 111  68 - 131 mg/dL Final    TSH 12/16/2022 1.280  0.400 - 4.000 uIU/mL Final    Comment: Warning:  Heterophilic antibodies in serum or plasma of   certain individuals are known to cause interference with   immunoassays. These antibodies may be present in blood samples   from individuals regularly exposed to animal or who have been   treated with animal products.     Patients taking high doses of supplemental biotin may have  negatively biased results.       Follicle Stimulating Hormone 12/16/2022 2.39  See Text mIU/mL Final    Comment: Female Reference Ranges:  Follicular Phase.................3.03-8.08 mIU/mL  Midcycle Peak....................2.55-16.69 mIU/mL  Luteal Phase.....................1.38-5.47 mIU/mL  Postmenopausal...................26..41  mIU/mL  Male Reference Range:............0.95-11.95 mIU/mL      LH 12/16/2022 4.5  See Text mIU/mL Final    Comment: Female Reference Ranges:  Follicular phase.............1.8-11.8 mIU/mL  Midcycle phase...............7.6-89.1 mIU/mL  Luteal phase.................0.6-14.0 mIU/mL  Post-menopausal without HRT..5.2-62.0 mIU/mL  Male Reference Interval......0.6-12.1 mIU/mL       Mammo Tomosynthesis Screening Bilateral (09/30/2022 1:56 PM CDT)  Imaging Results - Mammo Tomosynthesis Screening Bilateral (09/30/2022 1:56 PM CDT)  Anatomical Region Laterality Modality   Breast Bilateral Mammography     Imaging Results - Mammo Tomosynthesis Screening Bilateral (09/30/2022 1:56 PM CDT)  Specimen (Source) Anatomical Location / Laterality Collection Method / Volume Collection Time Received Time         09/30/2022 1:59 PM CDT       Imaging Results - Mammo Tomosynthesis Screening Bilateral (09/30/2022 1:56 PM CDT)  Impressions   09/30/2022 2:01 PM CDT    No mammographic evidence of malignancy    BI-RADS: 1-Negative    RECOMMENDATION:   Screening mammogram in one year is recommended.    Lifetime risk of developing breast cancer in accordance with Tyrer-Cuzick model is estimated at 11.1% (women with over a 20% risk may benefit from supplemental screening MRI).      CMS MANDATED QUALITY DATA - MAMMOGRAPHY - 225    [facility] utilizes a reminder system to ensure that all patients receive reminder letters and/or direct phone call for appointments.  This includes reminders for routine screening mammograms, diagnostic mammograms, and other breast imaging interventions when appropriate.  This patient will be placed in the appropriate reminder system.                    Electronically Signed By: Wayne Winter MD 9/30/2022 2:01 PM CDT       ASSESSMENT/PLAN:  1. Encounter for general adult medical examination with abnormal findings  Overview:  - preventative health counseling  - counseling on current recommendations for  breast cancer screening. Up to date  - counseling on current recommendations for cervical cancer screening. Up to date  - counseling on current recommendations for colon cancer screening. Average risk and rec start screening 46 yo  - Vaccines recommended at this visit: see below      2. Essential hypertension  Overview:  - well controlled  - but recurrent hypokalemia with chlorthalidone and KCl supplement  - recommends stop chlorthalidone and KCl  - recommend start spironolactone 50 mg daily  - monitor BP regularly  - counseling regarding new medication including expected results, potential side effects, and appropriate use.  - questions elicited and answered  - encouraged to patient to notify me of any questions or concerns    Orders:  -     spironolactone (ALDACTONE) 50 MG tablet; Take 1 tablet (50 mg total) by mouth once daily.  Dispense: 90 tablet; Refill: 3  -     Basic Metabolic Panel; Future; Expected date: 01/21/2023    3. Hypokalemia  Overview:    Lab Results   Component Value Date    K 3.3 (L) 12/16/2022     - recurrent hypokalemia with chlorthalidone and KCl supplement  - recommends stop chlorthalidone and KCl  - recommend start spironolactone 50 mg daily  - monitor BP regularly  - counseling regarding new medication including expected results, potential side effects, and appropriate use.  - questions elicited and answered  - encouraged to patient to notify me of any questions or concerns  - repeat BMP in 1 month    Orders:  -     spironolactone (ALDACTONE) 50 MG tablet; Take 1 tablet (50 mg total) by mouth once daily.  Dispense: 90 tablet; Refill: 3  -     Basic Metabolic Panel; Future; Expected date: 01/21/2023    4. Impaired fasting glucose  Overview:  Lab Results   Component Value Date    HGBA1C 5.5 12/16/2022     - discussed recommendation for diet and cardiovascular exercise  - counseling on lifestyle modifications for risk factor reduction        5. Generalized anxiety disorder  Overview:  - well  controlled  - continue current medication      6. Recurrent major depressive disorder, in full remission  Overview:  - well controlled  - continue current medication            ORDERS:   Orders Placed This Encounter    Basic Metabolic Panel    spironolactone (ALDACTONE) 50 MG tablet       Vaccines recommended:  COVID 19 booster and influenza vaccine. She declined    Today I discussed that I will no longer be practicing at [facility] effective 1/2023. My new location will be at [facility]. We assisted Alyssa Robledo in establishing with a new provider for follow-up. I encouraged Alyssa Robledo to notify me with an questions or concerns prior to my departure.       Follow-up in 6-12 months or sooner if any concerns.      This note is dictated using the M*Modal Fluency Direct word recognition program. There are word recognition mistakes that are occasionally missed on review.    Dr. Alba Mayers D.O.   Family Medicine

## 2022-12-21 ENCOUNTER — PATIENT OUTREACH (OUTPATIENT)
Dept: ADMINISTRATIVE | Facility: HOSPITAL | Age: 42
End: 2022-12-21
Payer: COMMERCIAL

## 2022-12-21 ENCOUNTER — OFFICE VISIT (OUTPATIENT)
Dept: FAMILY MEDICINE | Facility: CLINIC | Age: 42
End: 2022-12-21
Payer: COMMERCIAL

## 2022-12-21 VITALS
BODY MASS INDEX: 30.73 KG/M2 | HEIGHT: 69 IN | HEART RATE: 86 BPM | SYSTOLIC BLOOD PRESSURE: 120 MMHG | OXYGEN SATURATION: 100 % | WEIGHT: 207.5 LBS | DIASTOLIC BLOOD PRESSURE: 80 MMHG

## 2022-12-21 DIAGNOSIS — I10 ESSENTIAL HYPERTENSION: ICD-10-CM

## 2022-12-21 DIAGNOSIS — Z00.01 ENCOUNTER FOR GENERAL ADULT MEDICAL EXAMINATION WITH ABNORMAL FINDINGS: Primary | ICD-10-CM

## 2022-12-21 DIAGNOSIS — F41.1 GENERALIZED ANXIETY DISORDER: ICD-10-CM

## 2022-12-21 DIAGNOSIS — R73.01 IMPAIRED FASTING GLUCOSE: ICD-10-CM

## 2022-12-21 DIAGNOSIS — E87.6 HYPOKALEMIA: ICD-10-CM

## 2022-12-21 DIAGNOSIS — F33.42 RECURRENT MAJOR DEPRESSIVE DISORDER, IN FULL REMISSION: ICD-10-CM

## 2022-12-21 PROBLEM — R26.9 ABNORMAL GAIT: Status: RESOLVED | Noted: 2021-01-05 | Resolved: 2022-12-21

## 2022-12-21 PROBLEM — T85.848A PAIN FROM IMPLANTED HARDWARE: Status: RESOLVED | Noted: 2022-06-08 | Resolved: 2022-12-21

## 2022-12-21 PROCEDURE — 1159F PR MEDICATION LIST DOCUMENTED IN MEDICAL RECORD: ICD-10-PCS | Mod: CPTII,S$GLB,, | Performed by: FAMILY MEDICINE

## 2022-12-21 PROCEDURE — 99999 PR PBB SHADOW E&M-EST. PATIENT-LVL IV: ICD-10-PCS | Mod: PBBFAC,,, | Performed by: FAMILY MEDICINE

## 2022-12-21 PROCEDURE — 3074F SYST BP LT 130 MM HG: CPT | Mod: CPTII,S$GLB,, | Performed by: FAMILY MEDICINE

## 2022-12-21 PROCEDURE — 3008F PR BODY MASS INDEX (BMI) DOCUMENTED: ICD-10-PCS | Mod: CPTII,S$GLB,, | Performed by: FAMILY MEDICINE

## 2022-12-21 PROCEDURE — 3079F DIAST BP 80-89 MM HG: CPT | Mod: CPTII,S$GLB,, | Performed by: FAMILY MEDICINE

## 2022-12-21 PROCEDURE — 99999 PR PBB SHADOW E&M-EST. PATIENT-LVL IV: CPT | Mod: PBBFAC,,, | Performed by: FAMILY MEDICINE

## 2022-12-21 PROCEDURE — 3008F BODY MASS INDEX DOCD: CPT | Mod: CPTII,S$GLB,, | Performed by: FAMILY MEDICINE

## 2022-12-21 PROCEDURE — 1160F RVW MEDS BY RX/DR IN RCRD: CPT | Mod: CPTII,S$GLB,, | Performed by: FAMILY MEDICINE

## 2022-12-21 PROCEDURE — 3044F PR MOST RECENT HEMOGLOBIN A1C LEVEL <7.0%: ICD-10-PCS | Mod: CPTII,S$GLB,, | Performed by: FAMILY MEDICINE

## 2022-12-21 PROCEDURE — 3074F PR MOST RECENT SYSTOLIC BLOOD PRESSURE < 130 MM HG: ICD-10-PCS | Mod: CPTII,S$GLB,, | Performed by: FAMILY MEDICINE

## 2022-12-21 PROCEDURE — 3044F HG A1C LEVEL LT 7.0%: CPT | Mod: CPTII,S$GLB,, | Performed by: FAMILY MEDICINE

## 2022-12-21 PROCEDURE — 1160F PR REVIEW ALL MEDS BY PRESCRIBER/CLIN PHARMACIST DOCUMENTED: ICD-10-PCS | Mod: CPTII,S$GLB,, | Performed by: FAMILY MEDICINE

## 2022-12-21 PROCEDURE — 99396 PREV VISIT EST AGE 40-64: CPT | Mod: S$GLB,,, | Performed by: FAMILY MEDICINE

## 2022-12-21 PROCEDURE — 99396 PR PREVENTIVE VISIT,EST,40-64: ICD-10-PCS | Mod: S$GLB,,, | Performed by: FAMILY MEDICINE

## 2022-12-21 PROCEDURE — 3079F PR MOST RECENT DIASTOLIC BLOOD PRESSURE 80-89 MM HG: ICD-10-PCS | Mod: CPTII,S$GLB,, | Performed by: FAMILY MEDICINE

## 2022-12-21 PROCEDURE — 1159F MED LIST DOCD IN RCRD: CPT | Mod: CPTII,S$GLB,, | Performed by: FAMILY MEDICINE

## 2022-12-21 RX ORDER — SPIRONOLACTONE 50 MG/1
50 TABLET, FILM COATED ORAL DAILY
Qty: 90 TABLET | Refills: 3 | Status: SHIPPED | OUTPATIENT
Start: 2022-12-21 | End: 2023-01-03

## 2023-01-02 ENCOUNTER — PATIENT MESSAGE (OUTPATIENT)
Dept: PRIMARY CARE CLINIC | Facility: CLINIC | Age: 43
End: 2023-01-02
Payer: COMMERCIAL

## 2023-01-03 ENCOUNTER — PATIENT MESSAGE (OUTPATIENT)
Dept: PRIMARY CARE CLINIC | Facility: CLINIC | Age: 43
End: 2023-01-03
Payer: COMMERCIAL

## 2023-01-03 DIAGNOSIS — E87.6 HYPOKALEMIA: ICD-10-CM

## 2023-01-03 DIAGNOSIS — I10 ESSENTIAL HYPERTENSION: Primary | ICD-10-CM

## 2023-01-03 RX ORDER — CHLORTHALIDONE 50 MG/1
25 TABLET ORAL DAILY
COMMUNITY
End: 2023-01-04 | Stop reason: SDUPTHER

## 2023-01-04 RX ORDER — POTASSIUM CHLORIDE 20 MEQ/1
20 TABLET, EXTENDED RELEASE ORAL DAILY
Qty: 90 TABLET | Refills: 3 | Status: SHIPPED | OUTPATIENT
Start: 2023-01-04 | End: 2023-11-26 | Stop reason: SDUPTHER

## 2023-01-04 RX ORDER — CHLORTHALIDONE 50 MG/1
50 TABLET ORAL DAILY
Qty: 90 TABLET | Refills: 3 | Status: SHIPPED | OUTPATIENT
Start: 2023-01-04 | End: 2024-01-03 | Stop reason: ALTCHOICE

## 2023-03-14 ENCOUNTER — PATIENT MESSAGE (OUTPATIENT)
Dept: PRIMARY CARE CLINIC | Facility: CLINIC | Age: 43
End: 2023-03-14
Payer: COMMERCIAL

## 2023-03-15 ENCOUNTER — PATIENT MESSAGE (OUTPATIENT)
Dept: PRIMARY CARE CLINIC | Facility: CLINIC | Age: 43
End: 2023-03-15
Payer: COMMERCIAL

## 2023-03-15 RX ORDER — FLUCONAZOLE 150 MG/1
150 TABLET ORAL DAILY
Qty: 1 TABLET | Refills: 0 | Status: SHIPPED | OUTPATIENT
Start: 2023-03-15 | End: 2023-03-16

## 2023-03-27 PROBLEM — Z00.01 ENCOUNTER FOR GENERAL ADULT MEDICAL EXAMINATION WITH ABNORMAL FINDINGS: Status: RESOLVED | Noted: 2019-12-23 | Resolved: 2023-03-27

## 2023-05-18 DIAGNOSIS — F41.1 GENERALIZED ANXIETY DISORDER: ICD-10-CM

## 2023-05-18 RX ORDER — FLUOXETINE HYDROCHLORIDE 40 MG/1
CAPSULE ORAL
Qty: 90 CAPSULE | Refills: 2 | Status: SHIPPED | OUTPATIENT
Start: 2023-05-18 | End: 2024-02-27 | Stop reason: SDUPTHER

## 2023-05-18 NOTE — TELEPHONE ENCOUNTER
Refill Decision Note   Alyssa Robledo  is requesting a refill authorization.  Brief Assessment and Rationale for Refill:  Approve     Medication Therapy Plan:       Medication Reconciliation Completed: No   Comments:     No Care Gaps recommended.     Note composed:10:24 AM 05/18/2023

## 2023-05-18 NOTE — TELEPHONE ENCOUNTER
No care due was identified.  [facility] Embedded Care Due Messages. Reference number: [id number].   5/18/2023 7:18:48 AM CDT

## 2023-07-23 ENCOUNTER — PATIENT MESSAGE (OUTPATIENT)
Dept: PRIMARY CARE CLINIC | Facility: CLINIC | Age: 43
End: 2023-07-23
Payer: COMMERCIAL

## 2023-07-24 ENCOUNTER — PATIENT MESSAGE (OUTPATIENT)
Dept: PRIMARY CARE CLINIC | Facility: CLINIC | Age: 43
End: 2023-07-24
Payer: COMMERCIAL

## 2023-07-24 RX ORDER — ONDANSETRON 4 MG/1
4 TABLET, FILM COATED ORAL EVERY 6 HOURS PRN
Qty: 20 TABLET | Refills: 0 | Status: SHIPPED | OUTPATIENT
Start: 2023-07-24

## 2023-08-22 ENCOUNTER — PATIENT MESSAGE (OUTPATIENT)
Dept: OBSTETRICS AND GYNECOLOGY | Facility: CLINIC | Age: 43
End: 2023-08-22
Payer: COMMERCIAL

## 2023-09-14 ENCOUNTER — OFFICE VISIT (OUTPATIENT)
Dept: OBSTETRICS AND GYNECOLOGY | Facility: CLINIC | Age: 43
End: 2023-09-14
Payer: COMMERCIAL

## 2023-09-14 ENCOUNTER — TELEPHONE (OUTPATIENT)
Dept: OBSTETRICS AND GYNECOLOGY | Facility: CLINIC | Age: 43
End: 2023-09-14
Payer: COMMERCIAL

## 2023-09-14 VITALS
BODY MASS INDEX: 30.04 KG/M2 | HEIGHT: 69 IN | DIASTOLIC BLOOD PRESSURE: 76 MMHG | HEART RATE: 88 BPM | WEIGHT: 202.81 LBS | SYSTOLIC BLOOD PRESSURE: 128 MMHG

## 2023-09-14 DIAGNOSIS — Z01.419 ENCNTR FOR GYN EXAM (GENERAL) (ROUTINE) W/O ABN FINDINGS: Primary | ICD-10-CM

## 2023-09-14 DIAGNOSIS — N92.0 MENORRHAGIA WITH REGULAR CYCLE: ICD-10-CM

## 2023-09-14 DIAGNOSIS — Z12.31 BREAST CANCER SCREENING BY MAMMOGRAM: ICD-10-CM

## 2023-09-14 PROCEDURE — 1159F PR MEDICATION LIST DOCUMENTED IN MEDICAL RECORD: ICD-10-PCS | Mod: CPTII,S$GLB,, | Performed by: STUDENT IN AN ORGANIZED HEALTH CARE EDUCATION/TRAINING PROGRAM

## 2023-09-14 PROCEDURE — 3008F BODY MASS INDEX DOCD: CPT | Mod: CPTII,S$GLB,, | Performed by: STUDENT IN AN ORGANIZED HEALTH CARE EDUCATION/TRAINING PROGRAM

## 2023-09-14 PROCEDURE — 1160F RVW MEDS BY RX/DR IN RCRD: CPT | Mod: CPTII,S$GLB,, | Performed by: STUDENT IN AN ORGANIZED HEALTH CARE EDUCATION/TRAINING PROGRAM

## 2023-09-14 PROCEDURE — 99386 PR PREVENTIVE VISIT,NEW,40-64: ICD-10-PCS | Mod: S$GLB,,, | Performed by: STUDENT IN AN ORGANIZED HEALTH CARE EDUCATION/TRAINING PROGRAM

## 2023-09-14 PROCEDURE — 1160F PR REVIEW ALL MEDS BY PRESCRIBER/CLIN PHARMACIST DOCUMENTED: ICD-10-PCS | Mod: CPTII,S$GLB,, | Performed by: STUDENT IN AN ORGANIZED HEALTH CARE EDUCATION/TRAINING PROGRAM

## 2023-09-14 PROCEDURE — 3078F PR MOST RECENT DIASTOLIC BLOOD PRESSURE < 80 MM HG: ICD-10-PCS | Mod: CPTII,S$GLB,, | Performed by: STUDENT IN AN ORGANIZED HEALTH CARE EDUCATION/TRAINING PROGRAM

## 2023-09-14 PROCEDURE — 3074F SYST BP LT 130 MM HG: CPT | Mod: CPTII,S$GLB,, | Performed by: STUDENT IN AN ORGANIZED HEALTH CARE EDUCATION/TRAINING PROGRAM

## 2023-09-14 PROCEDURE — 99999 PR PBB SHADOW E&M-EST. PATIENT-LVL V: CPT | Mod: PBBFAC,,, | Performed by: STUDENT IN AN ORGANIZED HEALTH CARE EDUCATION/TRAINING PROGRAM

## 2023-09-14 PROCEDURE — 1159F MED LIST DOCD IN RCRD: CPT | Mod: CPTII,S$GLB,, | Performed by: STUDENT IN AN ORGANIZED HEALTH CARE EDUCATION/TRAINING PROGRAM

## 2023-09-14 PROCEDURE — 99999 PR PBB SHADOW E&M-EST. PATIENT-LVL V: ICD-10-PCS | Mod: PBBFAC,,, | Performed by: STUDENT IN AN ORGANIZED HEALTH CARE EDUCATION/TRAINING PROGRAM

## 2023-09-14 PROCEDURE — 99386 PREV VISIT NEW AGE 40-64: CPT | Mod: S$GLB,,, | Performed by: STUDENT IN AN ORGANIZED HEALTH CARE EDUCATION/TRAINING PROGRAM

## 2023-09-14 PROCEDURE — 3008F PR BODY MASS INDEX (BMI) DOCUMENTED: ICD-10-PCS | Mod: CPTII,S$GLB,, | Performed by: STUDENT IN AN ORGANIZED HEALTH CARE EDUCATION/TRAINING PROGRAM

## 2023-09-14 PROCEDURE — 3078F DIAST BP <80 MM HG: CPT | Mod: CPTII,S$GLB,, | Performed by: STUDENT IN AN ORGANIZED HEALTH CARE EDUCATION/TRAINING PROGRAM

## 2023-09-14 PROCEDURE — 3074F PR MOST RECENT SYSTOLIC BLOOD PRESSURE < 130 MM HG: ICD-10-PCS | Mod: CPTII,S$GLB,, | Performed by: STUDENT IN AN ORGANIZED HEALTH CARE EDUCATION/TRAINING PROGRAM

## 2023-09-14 RX ORDER — DROSPIRENONE AND ETHINYL ESTRADIOL 0.03MG-3MG
1 KIT ORAL DAILY
Qty: 84 TABLET | Refills: 4 | Status: SHIPPED | OUTPATIENT
Start: 2023-09-14 | End: 2023-12-12

## 2023-09-14 NOTE — PROGRESS NOTES
Subjective:    Patient ID: Alyssa Robledo is a 43 y.o. y.o. female.     Chief Complaint: Annual Well Woman Exam     History of Present Illness:  Alyssa presents today for Annual Well Woman exam. She describes her menses as with severe dysmenorrhea and She is having passage of clots with increased bleeding despite taking her OCPs .She denies pelvic pain.  She denies breast tenderness, masses, nipple discharge. She denies difficulty with urination or bowel movements. She admits to menopausal symptoms such as night sweats. She denies bloating, early satiety, or weight changes. She is sexually active. Contraception is by oral contraceptives (estrogen/progesterone).      Menstrual History:   Patient's last menstrual period was 2023..     OB History          3    Para   2    Term   1       1    AB   1    Living             SAB   1    IAB        Ectopic        Multiple        Live Births                     The following portions of the patient's history were reviewed and updated as appropriate: allergies, current medications, past family history, past medical history, past social history, past surgical history, and problem list.    ROS:   CONSTITUTIONAL: diaphoresis  ENT: negative for sore throat, nasal congestion, nasal discharge, epistaxis, tinnitus, hearing loss  EYES: negative for blurry vision, decreased vision, loss of vision, eye pain, diplopia, photophobia, discharge  SKIN: Negative for rash, itching, hives  RESPIRATORY: negative for cough, hemoptysis, shortness of breath, pleuritic chest pain, wheezing  CARDIOVASCULAR: negative for chest pain, dyspnea on exertion, orthopnea, paroxysmal nocturnal dyspnea, edema, palpitations  BREAST: negative for breast  tenderness, breast mass, nipple discharge, or skin changes  GASTROINTESTINAL: negative for abdominal pain, flank pain, nausea, vomiting, diarrhea, constipation, black stool, blood in stool  GENITOURINARY: negative for abnormal vaginal  bleeding, amenorrhea, decreased libido, dysuria, genital sores, hematuria, incontinence, menorrhagia, pelvic pain, urinary frequency, vaginal discharge  HEMATOLOGIC/LYMPHATIC: negative for swollen lymph nodes, bleeding, bruising  MUSCULOSKELETAL: negative for back pain, joint pain, joint stiffness, joint swelling, muscle pain, muscle weakness  NEUROLOGICAL: negative for dizzy/vertigo, headache, focal weakness, numbness/tingling, speech problems, loss of consciousness, confusion, memory loss  BEHAVORIAL/PSYCH: negative for anxiety, depression, psychosis  ENDOCRINE: negative for polydipsia/polyuria, palpitations, skin changes, temperature intolerance, unexpected weight changes  ALLERGIC/IMMUNOLOGIC: negative for urticaria, hay fever, angioedema      Objective:    Vital Signs:  Vitals:    09/14/23 1540   BP: 128/76   Pulse: 88       Physical Exam:  General:  alert, cooperative, no distress   Skin:  Skin color, texture, turgor normal. No rashes or lesions   HEENT:  conjunctivae/corneas clear. PERRL.   Neck: supple, trachea midline, no adenopathy or thyromegally   Respiratory:  Normal effort   Breasts:  no discharge, erythema, tenderness, or palpable masses; no axillary lymphadenopathy   Abdomen:  soft, nontender, no palpable masses   Pelvis: External genitalia: normal general appearance  Urinary system: urethral meatus normal, bladder nontender  Vaginal: normal mucosa without prolapse or lesions  Cervix: normal appearance, presence of blood from cervical os  Uterus: normal size, shape, position  Adnexa: normal size, nontender bilaterally   Extremities: Normal ROM; no edema, no cyanosis   Neurologial: Normal strength and tone. No focal numbness or weakness.   Psychiatric: normal mood, speech, dress, and thought processes     LABS:  6/10/2021     A1C:  Recent Labs   Lab 12/16/22  0838   Hemoglobin A1C 5.5     CBC:  Recent Labs   Lab 05/20/22  0812 12/16/22  0838   WBC 7.80 8.14   RBC 4.57 4.69   Hemoglobin 13.3 13.6    Hematocrit 39.8 40.9   Platelets 303 298   MCV 87 87   MCH 29.1 29.0   MCHC 33.4 33.3     CMP:  Recent Labs   Lab 12/17/21  0834 05/20/22  0811 12/16/22  0838 01/20/23  0805   Glucose 110   < > 114 H 123 H   Calcium 9.5   < > 9.2 9.4   Albumin 4.3  --  4.4  --    Total Protein 7.7  --  8.1  --    Sodium 141   < > 137 137   Potassium 3.5   < > 3.3 L 3.3 L   CO2 27   < > 27 28   Chloride 99   < > 98 97   BUN 15   < > 17 16   Creatinine 0.61   < > 0.63 0.62   Alkaline Phosphatase 93  --  109  --    ALT 30  --  29  --    AST 32  --  33  --    Total Bilirubin 0.3  --  0.5  --     < > = values in this interval not displayed.     LIPIDS:  Recent Labs   Lab 12/17/21  0834 12/16/22  0838   TSH  --  1.280   HDL 70 70   Cholesterol 210 H 210 H   Triglycerides 86 101   LDL Cholesterol 122.8 119.8   HDL/Cholesterol Ratio 33.3 33.3   Non-HDL Cholesterol 140 140   Total Cholesterol/HDL Ratio 3.0 3.0     TSH:  Recent Labs   Lab 12/16/22  0838   TSH 1.280       Assessment:       Healthy female exam.     1. Encntr for gyn exam (general) (routine) w/o abn findings    2. Breast cancer screening by mammogram    3. Menorrhagia with regular cycle          Plan:      Problem List Items Addressed This Visit    None  Visit Diagnoses       Encntr for gyn exam (general) (routine) w/o abn findings    -  Primary    Breast cancer screening by mammogram        Relevant Orders    Mammo Digital Screening Bilat w/ Aníbal    Menorrhagia with regular cycle        Relevant Orders    US Transvaginal Non OB    Case Request Operating Room: HYSTEROSCOPY, WITH DILATION AND CURETTAGE OF UTERUS, ABLATION, ENDOMETRIUM, THERMAL, HYSTEROSCOPIC, SALPINGECTOMY, LAPAROSCOPIC (Completed)              COUNSELING:  Alyssa was counseled on STD prevention, use and side-effects of various contraceptive measures, A.C.O.G. Pap guidelines and recommendations for yearly pelvic exams in addition to recommendations for monthly self breast exams; to see her PCP for other health  maintenance.

## 2023-09-14 NOTE — Clinical Note
Hysteroscopy, D&C, ablation, lap salpingectomy; discussed EMB and she would like to have it prior to OR case

## 2023-09-15 ENCOUNTER — TELEPHONE (OUTPATIENT)
Dept: OBSTETRICS AND GYNECOLOGY | Facility: CLINIC | Age: 43
End: 2023-09-15
Payer: COMMERCIAL

## 2023-09-15 NOTE — TELEPHONE ENCOUNTER
BLS, Hysteroscopic D&C/Ablation scheduled for 11/15/2023 per pt's request with pre-op, pre-admit and post op appointments scheduled and discussed with patient.  Instructed she will need transportation to and from the hospital on day of surgery.  Pt verbalized understanding.  Case request number [id number].  Alba in surgery department notified of date and time.

## 2023-09-28 ENCOUNTER — PROCEDURE VISIT (OUTPATIENT)
Dept: OBSTETRICS AND GYNECOLOGY | Facility: CLINIC | Age: 43
End: 2023-09-28
Payer: COMMERCIAL

## 2023-09-28 VITALS
HEIGHT: 69 IN | SYSTOLIC BLOOD PRESSURE: 122 MMHG | DIASTOLIC BLOOD PRESSURE: 72 MMHG | BODY MASS INDEX: 29.33 KG/M2 | WEIGHT: 198 LBS | HEART RATE: 92 BPM

## 2023-09-28 DIAGNOSIS — N92.0 MENORRHAGIA WITH REGULAR CYCLE: ICD-10-CM

## 2023-09-28 DIAGNOSIS — Z01.818 PRE-PROCEDURAL EXAMINATION: Primary | ICD-10-CM

## 2023-09-28 LAB
B-HCG UR QL: NEGATIVE
CTP QC/QA: YES

## 2023-09-28 PROCEDURE — 58100 BIOPSY OF UTERUS LINING: CPT | Mod: S$GLB,,, | Performed by: STUDENT IN AN ORGANIZED HEALTH CARE EDUCATION/TRAINING PROGRAM

## 2023-09-28 PROCEDURE — 58100 ENDOMETRIAL BIOPSY: ICD-10-PCS | Mod: S$GLB,,, | Performed by: STUDENT IN AN ORGANIZED HEALTH CARE EDUCATION/TRAINING PROGRAM

## 2023-09-28 PROCEDURE — 81025 POCT URINE PREGNANCY: ICD-10-PCS | Mod: S$GLB,,, | Performed by: STUDENT IN AN ORGANIZED HEALTH CARE EDUCATION/TRAINING PROGRAM

## 2023-09-28 PROCEDURE — 99499 NO LOS: ICD-10-PCS | Mod: S$GLB,,, | Performed by: STUDENT IN AN ORGANIZED HEALTH CARE EDUCATION/TRAINING PROGRAM

## 2023-09-28 PROCEDURE — 81025 URINE PREGNANCY TEST: CPT | Mod: S$GLB,,, | Performed by: STUDENT IN AN ORGANIZED HEALTH CARE EDUCATION/TRAINING PROGRAM

## 2023-09-28 PROCEDURE — 88305 TISSUE EXAM BY PATHOLOGIST: CPT | Mod: 26,,, | Performed by: PATHOLOGY

## 2023-09-28 PROCEDURE — 88305 TISSUE EXAM BY PATHOLOGIST: CPT | Performed by: PATHOLOGY

## 2023-09-28 PROCEDURE — 88305 TISSUE EXAM BY PATHOLOGIST: ICD-10-PCS | Mod: 26,,, | Performed by: PATHOLOGY

## 2023-09-28 PROCEDURE — 99499 UNLISTED E&M SERVICE: CPT | Mod: S$GLB,,, | Performed by: STUDENT IN AN ORGANIZED HEALTH CARE EDUCATION/TRAINING PROGRAM

## 2023-09-28 NOTE — PROCEDURES
Endometrial biopsy    Date/Time: 9/28/2023 10:45 AM    Performed by: Sherine Springer MD  Authorized by: Sherine Springer MD    Consent:     Consent obtained:  Written    Consent given by:  Patient    Patient questions answered: yes      Patient agrees, verbalizes understanding, and wants to proceed: yes      Educational handouts given: yes      Instructions and paperwork completed: yes    Indication:     Indications: Menorrhagia and Other disorder of menstruation and other abnormal bleeding from female genital tract    Procedure:     Procedure: endometrial biopsy with Pipelle      Cervix cleaned and prepped: yes      The cervix was dilated: yes      Uterus sounded: yes      Uterus sound depth (cm):  7    Specimen collected: specimen collected and sent to pathology      Patient tolerated procedure well with no complications: yes

## 2023-10-03 LAB
FINAL PATHOLOGIC DIAGNOSIS: NORMAL
GROSS: NORMAL
Lab: NORMAL

## 2023-10-24 ENCOUNTER — PATIENT MESSAGE (OUTPATIENT)
Dept: OBSTETRICS AND GYNECOLOGY | Facility: CLINIC | Age: 43
End: 2023-10-24
Payer: COMMERCIAL

## 2023-11-01 ENCOUNTER — HOSPITAL ENCOUNTER (OUTPATIENT)
Dept: RADIOLOGY | Facility: HOSPITAL | Age: 43
Discharge: HOME | End: 2023-11-01
Attending: STUDENT IN AN ORGANIZED HEALTH CARE EDUCATION/TRAINING PROGRAM
Payer: COMMERCIAL

## 2023-11-01 DIAGNOSIS — Z12.31 BREAST CANCER SCREENING BY MAMMOGRAM: ICD-10-CM

## 2023-11-01 PROCEDURE — 77067 SCR MAMMO BI INCL CAD: CPT | Mod: 26,,, | Performed by: RADIOLOGY

## 2023-11-01 PROCEDURE — 77063 MAMMO DIGITAL SCREENING BILAT WITH TOMO: ICD-10-PCS | Mod: 26,,, | Performed by: RADIOLOGY

## 2023-11-01 PROCEDURE — 77063 BREAST TOMOSYNTHESIS BI: CPT | Mod: 26,,, | Performed by: RADIOLOGY

## 2023-11-01 PROCEDURE — 77067 MAMMO DIGITAL SCREENING BILAT WITH TOMO: ICD-10-PCS | Mod: 26,,, | Performed by: RADIOLOGY

## 2023-11-01 PROCEDURE — 77067 SCR MAMMO BI INCL CAD: CPT | Mod: TC

## 2023-11-13 ENCOUNTER — OFFICE VISIT (OUTPATIENT)
Dept: OBSTETRICS AND GYNECOLOGY | Facility: CLINIC | Age: 43
End: 2023-11-13
Payer: COMMERCIAL

## 2023-11-13 ENCOUNTER — HOSPITAL ENCOUNTER (OUTPATIENT)
Dept: PREADMISSION TESTING | Facility: HOSPITAL | Age: 43
Discharge: HOME | End: 2023-11-13
Attending: STUDENT IN AN ORGANIZED HEALTH CARE EDUCATION/TRAINING PROGRAM
Payer: COMMERCIAL

## 2023-11-13 VITALS
HEART RATE: 84 BPM | HEIGHT: 69 IN | DIASTOLIC BLOOD PRESSURE: 76 MMHG | WEIGHT: 205.69 LBS | BODY MASS INDEX: 30.47 KG/M2 | SYSTOLIC BLOOD PRESSURE: 124 MMHG

## 2023-11-13 DIAGNOSIS — N93.8 DUB (DYSFUNCTIONAL UTERINE BLEEDING): ICD-10-CM

## 2023-11-13 DIAGNOSIS — N92.0 MENORRHAGIA WITH REGULAR CYCLE: Primary | ICD-10-CM

## 2023-11-13 PROCEDURE — 3008F PR BODY MASS INDEX (BMI) DOCUMENTED: ICD-10-PCS | Mod: CPTII,S$GLB,, | Performed by: STUDENT IN AN ORGANIZED HEALTH CARE EDUCATION/TRAINING PROGRAM

## 2023-11-13 PROCEDURE — 99213 PR OFFICE/OUTPT VISIT, EST, LEVL III, 20-29 MIN: ICD-10-PCS | Mod: S$GLB,,, | Performed by: STUDENT IN AN ORGANIZED HEALTH CARE EDUCATION/TRAINING PROGRAM

## 2023-11-13 PROCEDURE — 99213 OFFICE O/P EST LOW 20 MIN: CPT | Mod: S$GLB,,, | Performed by: STUDENT IN AN ORGANIZED HEALTH CARE EDUCATION/TRAINING PROGRAM

## 2023-11-13 PROCEDURE — 3008F BODY MASS INDEX DOCD: CPT | Mod: CPTII,S$GLB,, | Performed by: STUDENT IN AN ORGANIZED HEALTH CARE EDUCATION/TRAINING PROGRAM

## 2023-11-13 PROCEDURE — 99999 PR PBB SHADOW E&M-EST. PATIENT-LVL III: ICD-10-PCS | Mod: PBBFAC,,, | Performed by: STUDENT IN AN ORGANIZED HEALTH CARE EDUCATION/TRAINING PROGRAM

## 2023-11-13 PROCEDURE — 3074F PR MOST RECENT SYSTOLIC BLOOD PRESSURE < 130 MM HG: ICD-10-PCS | Mod: CPTII,S$GLB,, | Performed by: STUDENT IN AN ORGANIZED HEALTH CARE EDUCATION/TRAINING PROGRAM

## 2023-11-13 PROCEDURE — 3078F DIAST BP <80 MM HG: CPT | Mod: CPTII,S$GLB,, | Performed by: STUDENT IN AN ORGANIZED HEALTH CARE EDUCATION/TRAINING PROGRAM

## 2023-11-13 PROCEDURE — 3074F SYST BP LT 130 MM HG: CPT | Mod: CPTII,S$GLB,, | Performed by: STUDENT IN AN ORGANIZED HEALTH CARE EDUCATION/TRAINING PROGRAM

## 2023-11-13 PROCEDURE — 99999 PR PBB SHADOW E&M-EST. PATIENT-LVL III: CPT | Mod: PBBFAC,,, | Performed by: STUDENT IN AN ORGANIZED HEALTH CARE EDUCATION/TRAINING PROGRAM

## 2023-11-13 PROCEDURE — 3078F PR MOST RECENT DIASTOLIC BLOOD PRESSURE < 80 MM HG: ICD-10-PCS | Mod: CPTII,S$GLB,, | Performed by: STUDENT IN AN ORGANIZED HEALTH CARE EDUCATION/TRAINING PROGRAM

## 2023-11-13 RX ORDER — FAMOTIDINE 20 MG/1
20 TABLET, FILM COATED ORAL
Status: CANCELLED | OUTPATIENT
Start: 2023-11-13

## 2023-11-13 RX ORDER — MUPIROCIN 20 MG/G
OINTMENT TOPICAL
Status: CANCELLED | OUTPATIENT
Start: 2023-11-13

## 2023-11-13 RX ORDER — SODIUM CHLORIDE 9 MG/ML
INJECTION, SOLUTION INTRAVENOUS CONTINUOUS
Status: CANCELLED | OUTPATIENT
Start: 2023-11-13

## 2023-11-13 NOTE — H&P (VIEW-ONLY)
Pre-Operative History and Physical   Obstetrics and Gynecology    Alyssa Robledo is a 43 y.o. female  for preop examination.  She is scheduled for a laparoscopic bilateral salpingectomy, hysteroscopy, endometrial ablation and dilation and curettage on 23.    She has a history of very heavy bleeding with her periods with severe pain as she has gotten older.       ROS:  GENERAL: Denies weight gain or weight loss. Feeling well overall.   SKIN: Denies rash or lesions.   HEAD: Denies head injury or headache.   NODES: Denies enlarged lymph nodes.   CHEST: Denies chest pain or shortness of breath.   CARDIOVASCULAR: Denies palpitations or left sided chest pain.   ABDOMEN: No abdominal pain, constipation, diarrhea, nausea, vomiting or rectal bleeding.   URINARY: No frequency, dysuria, hematuria, or burning on urination.  REPRODUCTIVE: See HPI.   BREASTS: The patient performs breast self-examination and denies pain, lumps, or nipple discharge.   HEMATOLOGIC: No easy bruisability or excessive bleeding with the exception of menstrual cycles.  MUSCULOSKELETAL: Denies joint pain or swelling.   NEUROLOGIC: Denies syncope or weakness.   PSYCHIATRIC: Denies depression, anxiety or mood swings.    Past Medical History:   Diagnosis Date    Anxiety     Closed fracture of head of metatarsal, left, with malunion, subsequent encounter 2020    GERD (gastroesophageal reflux disease)     Pain from implanted hardware 2022     Past Surgical History:   Procedure Laterality Date     SECTION      2    CORRECTION OF HAMMER TOE Left 2020    Procedure: CORRECTION, HAMMER TOE;  Surgeon: Ha Goetz DPM;  Location: [facility] OR;  Service: Podiatry;  Laterality: Left;  mini c-arm, Arthrex toe implant, toes 3,4Mallory confirmed 2020 0745 KB    DILATION AND CURETTAGE OF UTERUS      FOOT HARDWARE REMOVAL Left 2022    Procedure: REMOVAL, HARDWARE, FOOT;  Surgeon: Ha Goetz DPM;  Location: [facility] OR;   Service: Podiatry;  Laterality: Left;    OSTEOTOMY OF METATARSAL BONE Left 11/6/2020    Procedure: OSTEOTOMY, METATARSAL BONE;  Surgeon: Ha Goetz DPM;  Location: [facility] OR;  Service: Podiatry;  Laterality: Left;  Arthrex screws, dorsal exostectomy foot/deep peroneal nerve release included (Cuca notified 10/26, EF)     Family History   Problem Relation Age of Onset    No Known Problems Mother     No Known Problems Father     Breast cancer Maternal Grandmother     Heart disease Neg Hx     Cancer Neg Hx      Review of patient's allergies indicates:   Allergen Reactions    Phenergan [promethazine] Hives    Spironolactone Rash       Current Outpatient Medications:     butalbital-acetaminophen-caffeine -40 mg (FIORICET, ESGIC) -40 mg per tablet, Take 1 tablet by mouth every 4 (four) hours as needed for Pain., Disp: , Rfl:     cetirizine (ZYRTEC) 10 MG tablet, Take 10 mg by mouth once daily., Disp: , Rfl:     chlorthalidone (HYGROTEN) 50 MG Tab, Take 1 tablet (50 mg total) by mouth once daily., Disp: 90 tablet, Rfl: 3    drospirenone-ethinyl estradioL (OCELLA) 3-0.03 mg per tablet, Take 1 tablet by mouth once daily., Disp: 84 tablet, Rfl: 4    esomeprazole (NEXIUM) 20 MG capsule, Take 20 mg by mouth., Disp: , Rfl:     FLUoxetine 40 MG capsule, TAKE 1 CAPSULE BY MOUTH ONCE DAILY, Disp: 90 capsule, Rfl: 2    Lactobacillus acidophilus 10 billion cell Cap, Take 1 tablet by mouth once daily., Disp: , Rfl:     ondansetron (ZOFRAN) 4 MG tablet, Take 1 tablet (4 mg total) by mouth every 6 (six) hours as needed for Nausea., Disp: 20 tablet, Rfl: 0    potassium chloride SA (K-DUR,KLOR-CON) 20 MEQ tablet, Take 1 tablet (20 mEq total) by mouth once daily. for 365 doses, Disp: 90 tablet, Rfl: 3    Current Facility-Administered Medications:     sodium chloride 0.9% flush 10 mL, 10 mL, Intravenous, PRN, Ha Goetz DPM    sodium chloride 0.9% flush 10 mL, 10 mL, Intravenous, PRN, Ha Goetz,  LETI  Outpatient Medications Marked as Taking for the 11/13/23 encounter (Office Visit) with Sherine Springer MD   Medication Sig Dispense Refill    butalbital-acetaminophen-caffeine -40 mg (FIORICET, ESGIC) -40 mg per tablet Take 1 tablet by mouth every 4 (four) hours as needed for Pain.      cetirizine (ZYRTEC) 10 MG tablet Take 10 mg by mouth once daily.      chlorthalidone (HYGROTEN) 50 MG Tab Take 1 tablet (50 mg total) by mouth once daily. 90 tablet 3    drospirenone-ethinyl estradioL (OCELLA) 3-0.03 mg per tablet Take 1 tablet by mouth once daily. 84 tablet 4    esomeprazole (NEXIUM) 20 MG capsule Take 20 mg by mouth.      FLUoxetine 40 MG capsule TAKE 1 CAPSULE BY MOUTH ONCE DAILY 90 capsule 2    Lactobacillus acidophilus 10 billion cell Cap Take 1 tablet by mouth once daily.      ondansetron (ZOFRAN) 4 MG tablet Take 1 tablet (4 mg total) by mouth every 6 (six) hours as needed for Nausea. 20 tablet 0    potassium chloride SA (K-DUR,KLOR-CON) 20 MEQ tablet Take 1 tablet (20 mEq total) by mouth once daily. for 365 doses 90 tablet 3     Current Facility-Administered Medications for the 11/13/23 encounter (Office Visit) with Sherine Springer MD   Medication Dose Route Frequency Provider Last Rate Last Admin    sodium chloride 0.9% flush 10 mL  10 mL Intravenous PRN Ha Goetz DPM        sodium chloride 0.9% flush 10 mL  10 mL Intravenous PRN Ha Goetz DPM         Social History     Tobacco Use    Smoking status: Never    Smokeless tobacco: Never   Substance Use Topics    Alcohol use: Yes     Alcohol/week: 0.8 standard drinks of alcohol     Types: 1 Standard drinks or equivalent per week     Comment: 3     Drug use: No       Last pathology 9/28/23      Component 1 mo ago   Final Pathologic Diagnosis Endometrial, currettings:  Early secretory endometrium  No evidence of hyperplasia or malignancy     Last ultrasound 9/28/23    FINDINGS:  The uterus measures 6.7 x  2.9 x 4.3 cm and has a homogeneous echotexture.  The endometrial stripe measures 0.2 cm.  The right and left ovaries measure 2.5 x 1.1 x 1 cm and 2 x 1 x 1.3 cm respectively.  The ovaries are unremarkable.  There is no adnexal mass or significant amount of fluid within the pelvis.     Impression:     No significant abnormality.  Vitals:    23 1205   BP: 124/76   Pulse: 84     General Appearance: Alert, appropriate appearance for age. No acute distress, Chest/Respiratory Exam: Normal.   Cardiovascular Exam: regular rate and rhythm, S1, S2 normal, no murmur, click, rub or gallop   Gastrointestinal Exam: soft, non-tender; bowel sounds normal; no masses,  no organomegaly  Pelvic Exam Female: Exam deferred.   Psychiatric Exam: Alert and oriented, appropriate affect.    Assessment: 43 y.o. female  for preop examination.  She is scheduled for a laparoscopic bilateral salpingectomy, hysteroscopy, endometrial ablation and dilation and curettage on 23.    Plan:   Type and screen, CBC, UPT    I have discussed the risks, benefits, indications, and alternatives of the procedure in detail.  The patient verbalizes her understanding.  All questions answered.  Consents signed.  The patient agrees to proceed to proceed as planned.

## 2023-11-13 NOTE — PROGRESS NOTES
Pre-Operative History and Physical   Obstetrics and Gynecology    Alyssa Robledo is a 43 y.o. female  for preop examination.  She is scheduled for a laparoscopic bilateral salpingectomy, hysteroscopy, endometrial ablation and dilation and curettage on 23.    She has a history of very heavy bleeding with her periods with severe pain as she has gotten older.       ROS:  GENERAL: Denies weight gain or weight loss. Feeling well overall.   SKIN: Denies rash or lesions.   HEAD: Denies head injury or headache.   NODES: Denies enlarged lymph nodes.   CHEST: Denies chest pain or shortness of breath.   CARDIOVASCULAR: Denies palpitations or left sided chest pain.   ABDOMEN: No abdominal pain, constipation, diarrhea, nausea, vomiting or rectal bleeding.   URINARY: No frequency, dysuria, hematuria, or burning on urination.  REPRODUCTIVE: See HPI.   BREASTS: The patient performs breast self-examination and denies pain, lumps, or nipple discharge.   HEMATOLOGIC: No easy bruisability or excessive bleeding with the exception of menstrual cycles.  MUSCULOSKELETAL: Denies joint pain or swelling.   NEUROLOGIC: Denies syncope or weakness.   PSYCHIATRIC: Denies depression, anxiety or mood swings.    Past Medical History:   Diagnosis Date    Anxiety     Closed fracture of head of metatarsal, left, with malunion, subsequent encounter 2020    GERD (gastroesophageal reflux disease)     Pain from implanted hardware 2022     Past Surgical History:   Procedure Laterality Date     SECTION      2    CORRECTION OF HAMMER TOE Left 2020    Procedure: CORRECTION, HAMMER TOE;  Surgeon: Ha Goetz DPM;  Location: [facility] OR;  Service: Podiatry;  Laterality: Left;  mini c-arm, Arthrex toe implant, toes 3,4Mallory confirmed 2020 0745 KB    DILATION AND CURETTAGE OF UTERUS      FOOT HARDWARE REMOVAL Left 2022    Procedure: REMOVAL, HARDWARE, FOOT;  Surgeon: Ha Goetz DPM;  Location: [facility] OR;   Service: Podiatry;  Laterality: Left;    OSTEOTOMY OF METATARSAL BONE Left 11/6/2020    Procedure: OSTEOTOMY, METATARSAL BONE;  Surgeon: Ha Goetz DPM;  Location: [facility] OR;  Service: Podiatry;  Laterality: Left;  Arthrex screws, dorsal exostectomy foot/deep peroneal nerve release included (Cuca notified 10/26, EF)     Family History   Problem Relation Age of Onset    No Known Problems Mother     No Known Problems Father     Breast cancer Maternal Grandmother     Heart disease Neg Hx     Cancer Neg Hx      Review of patient's allergies indicates:   Allergen Reactions    Phenergan [promethazine] Hives    Spironolactone Rash       Current Outpatient Medications:     butalbital-acetaminophen-caffeine -40 mg (FIORICET, ESGIC) -40 mg per tablet, Take 1 tablet by mouth every 4 (four) hours as needed for Pain., Disp: , Rfl:     cetirizine (ZYRTEC) 10 MG tablet, Take 10 mg by mouth once daily., Disp: , Rfl:     chlorthalidone (HYGROTEN) 50 MG Tab, Take 1 tablet (50 mg total) by mouth once daily., Disp: 90 tablet, Rfl: 3    drospirenone-ethinyl estradioL (OCELLA) 3-0.03 mg per tablet, Take 1 tablet by mouth once daily., Disp: 84 tablet, Rfl: 4    esomeprazole (NEXIUM) 20 MG capsule, Take 20 mg by mouth., Disp: , Rfl:     FLUoxetine 40 MG capsule, TAKE 1 CAPSULE BY MOUTH ONCE DAILY, Disp: 90 capsule, Rfl: 2    Lactobacillus acidophilus 10 billion cell Cap, Take 1 tablet by mouth once daily., Disp: , Rfl:     ondansetron (ZOFRAN) 4 MG tablet, Take 1 tablet (4 mg total) by mouth every 6 (six) hours as needed for Nausea., Disp: 20 tablet, Rfl: 0    potassium chloride SA (K-DUR,KLOR-CON) 20 MEQ tablet, Take 1 tablet (20 mEq total) by mouth once daily. for 365 doses, Disp: 90 tablet, Rfl: 3    Current Facility-Administered Medications:     sodium chloride 0.9% flush 10 mL, 10 mL, Intravenous, PRN, Ha Goetz DPM    sodium chloride 0.9% flush 10 mL, 10 mL, Intravenous, PRN, Ha Goetz,  LETI  Outpatient Medications Marked as Taking for the 11/13/23 encounter (Office Visit) with Sherine Springer MD   Medication Sig Dispense Refill    butalbital-acetaminophen-caffeine -40 mg (FIORICET, ESGIC) -40 mg per tablet Take 1 tablet by mouth every 4 (four) hours as needed for Pain.      cetirizine (ZYRTEC) 10 MG tablet Take 10 mg by mouth once daily.      chlorthalidone (HYGROTEN) 50 MG Tab Take 1 tablet (50 mg total) by mouth once daily. 90 tablet 3    drospirenone-ethinyl estradioL (OCELLA) 3-0.03 mg per tablet Take 1 tablet by mouth once daily. 84 tablet 4    esomeprazole (NEXIUM) 20 MG capsule Take 20 mg by mouth.      FLUoxetine 40 MG capsule TAKE 1 CAPSULE BY MOUTH ONCE DAILY 90 capsule 2    Lactobacillus acidophilus 10 billion cell Cap Take 1 tablet by mouth once daily.      ondansetron (ZOFRAN) 4 MG tablet Take 1 tablet (4 mg total) by mouth every 6 (six) hours as needed for Nausea. 20 tablet 0    potassium chloride SA (K-DUR,KLOR-CON) 20 MEQ tablet Take 1 tablet (20 mEq total) by mouth once daily. for 365 doses 90 tablet 3     Current Facility-Administered Medications for the 11/13/23 encounter (Office Visit) with Sherine Springer MD   Medication Dose Route Frequency Provider Last Rate Last Admin    sodium chloride 0.9% flush 10 mL  10 mL Intravenous PRN Ha Goetz DPM        sodium chloride 0.9% flush 10 mL  10 mL Intravenous PRN Ha Goetz DPM         Social History     Tobacco Use    Smoking status: Never    Smokeless tobacco: Never   Substance Use Topics    Alcohol use: Yes     Alcohol/week: 0.8 standard drinks of alcohol     Types: 1 Standard drinks or equivalent per week     Comment: 3     Drug use: No       Last pathology 9/28/23      Component 1 mo ago   Final Pathologic Diagnosis Endometrial, currettings:  Early secretory endometrium  No evidence of hyperplasia or malignancy     Last ultrasound 9/28/23    FINDINGS:  The uterus measures 6.7 x  2.9 x 4.3 cm and has a homogeneous echotexture.  The endometrial stripe measures 0.2 cm.  The right and left ovaries measure 2.5 x 1.1 x 1 cm and 2 x 1 x 1.3 cm respectively.  The ovaries are unremarkable.  There is no adnexal mass or significant amount of fluid within the pelvis.     Impression:     No significant abnormality.  Vitals:    23 1205   BP: 124/76   Pulse: 84     General Appearance: Alert, appropriate appearance for age. No acute distress, Chest/Respiratory Exam: Normal.   Cardiovascular Exam: regular rate and rhythm, S1, S2 normal, no murmur, click, rub or gallop   Gastrointestinal Exam: soft, non-tender; bowel sounds normal; no masses,  no organomegaly  Pelvic Exam Female: Exam deferred.   Psychiatric Exam: Alert and oriented, appropriate affect.    Assessment: 43 y.o. female  for preop examination.  She is scheduled for a laparoscopic bilateral salpingectomy, hysteroscopy, endometrial ablation and dilation and curettage on 23.    Plan:   Type and screen, CBC, UPT    I have discussed the risks, benefits, indications, and alternatives of the procedure in detail.  The patient verbalizes her understanding.  All questions answered.  Consents signed.  The patient agrees to proceed to proceed as planned.

## 2023-11-13 NOTE — DISCHARGE INSTRUCTIONS
[facility]  Pre Admit Instructions    Day and Date of Procedure: Wednesday 11/22/23  Arrival time: 7am      Call your doctor if you become ill, have an infection, or are taking antibiotics before your surgery  Someone will call you between 1 p.m. And 5 p.m. the workday before the procedure to give you an arrival time       - If your procedure is before 7 a.m. enter through Emergency Room door and check in with them       - 7 a.m. to 5 p.m. Enter through main entrance and check in with registration  You must have a responsible  to bring you home  Only one person will be allowed per patient due to Covid-19 restrictions  You must wear a mask at all times. If you do not have a mask, we will provide you with one. No Exception.   Cafeteria hours for guest: 7am to 10am; 11am to 1:30 pm.    Do NOT eat anything past: midnight  Clear liquids until:4am  No dairy, creamers, gum or mints the morning of your procedure    Please    Do not wear makeup, jewelry, nail polish or body piercings  Bring containers/solution for contacts, dentures, bridges - these and hearing aids will be removed before your procedure  Do not bring cash, jewelry or valuables the day of your procedure   No smoking at least 24 hours before your procedure  Wear clothing that is comfortable and easy to take off and put on  Do NOT shave for at least 5 days before your surgery    PRE-OP Hibiclens bath Instructions:    Shower with Hibiclens the Night before and morning of the procedure.   Wash your face with your regular cleanser. DO NOT use Hibiclens on your face.  Thoroughly rinse your body with warm water from the neck down  Apply Hibiclens directly to your skin or on a wet washcloth and wash gently. Do not stand under the water while washing with Hibiclens as you will   remove the wash too soon.  Rinse thoroughly with warm water  DO NOT use your regular soap after you have bathed with the Hibiclens  Do not apply lotion or deodorant   Put  on a clean pair of clothes after each bath          Information about your stay (Please Review)    Before Surgery  Your doctor may order and review labs, x-rays, ECG or other tests as a pre-surgery workup and will call you if there is need for follow up.  If you did not get a Covid test before your procedure, one will be done when you arrive. You must have a negative Covid test result before your procedure.  No smoking inside or outside the hospital. You must leave the campus to smoke.  Wear clothing that is easy to take off and put on.  The hospital will provide you with a gown.  You may bring robe, slippers, nightwear, and toiletries (toothbrush, toothpaste, makeup) if needed.  If your doctor orders a Fleets Enema or other prep, follow package and/or doctors orders.  Brush your teeth and rinse your mouth the morning of surgery, but dont swallow the water.  The nurse will ask questions and check your condition.  The doctor may harjeet your surgical site.  Compression boots will be placed on your calves to reduce the risk of blood clots.  An IV will be started in pre-procedure area.  The doctor may order medicine to help you relax before surgery.  After Surgery  After you recover in post-procedure area you will go to the medicine floor to recover for a few more hours.  The nurse will check your temperature, breathing, blood pressure, heart rate, IV site, and surgery site.  A diet will be ordered-most start with ice chips and then advance slowly to other foods.  If you have IV fluids the IV pump will beep to let the nurse know that she needs to check it.  You may have a urinary catheter and staff may measure your oral intake and urine output.  Pain medication may be ordered by the doctor after surgery.  If you have a pain management device tell your caretakers not to press the button because of OVERDOSE RISK.  When the nurse or doctor tells you it is okay to get out of bed, ask for help until you are stable.  The  nurse may ask you to turn, cough, and deep breathe to prevent lung problems.  You can use a pillow to hold your incision when you deep breathe or cough to reduce pain.  The nurse will give you discharge instructions--incision care, symptoms to report to your doctor, and your follow-up appointment when you are discharged. You cannot drive yourself home.  Only one person is allowed during your stay due to Covid-19 restrictions. Visiting hours are 8 am to 6 pm.    Goals for Discharge from One Day Surgery  Control pain with an oral medication  Walk without feeling dizzy or weak  Tolerate liquids well  Urinate without difficulty    Things you can do to  Reduce the Risk of Infections or Complications  Wash Hands and use Waterless Hand Sanitizers  Wash hands frequently with soap and warm water for at least 15 seconds.   Use hand sanitizers (alcohol based) often at home and in public if hands are not visibly soiled  Take Antibiotic Exactly as Prescribed  Do not stop antibiotics too soon; you risk developing infection resistant to antibiotics  Take your antibiotic even if you are feeling better and even if they upset your stomach  Call the doctor if you cant tolerate the antibiotic or you have an allergic reaction  Stay Healthy  Take medicines as prescribed by your doctor  Keep your diabetes under control - diet and medication  Get enough rest, exercise and eat a healthy diet  Keep the Wound Clean and Dry  Wash hands before and after taking care of the incision (cut)  Wash hands when you remove a dressing, before you touch/apply a new dressing  Shower and clean incision with antibacterial soap and rinse well if the doctor approves  Allow the cut to dry completely before putting on a clean dressing  Do not touch the part of the bandage that will cover the incision  Do not use ointments unless your doctor tells you to-can promote bacterial growth  If ordered, put ointment directly on the dressing-do not touch the end of the  tube  Do not scrub, remove scabs, or leave a damp dressing on the incision  Do not use peroxide or alcohol to clean the incision unless the doctor tells you to   Do not let children, pets or anyone else contaminate the incision  Stop Smoking To Prevent Infection  Stop smoking-Centers for Disease Control recommends 30 days before surgery  Smokers get more infections after surgery-studies have shown 6 times the risk  Smokers have more scarring and heal slower-open wounds get infected easier  Prevent Respiratory complications  Stop smoking  Turn, cough, and deep breathe even if you have some pain when you do so.  Splint your incision with a pillow when you cough/deep breath, to help control pain.  Do not lie in one position for long periods of time.   Prevent Blood Clots  When you wake move your legs, flex your feet, rotate your ankles, wiggle your toes  Get up when the doctor says its ok.  Dangle your feet from the side of the bed  Report symptoms-leg pain, redness/swelling, warm to touch; fever; shortness of breath, chest pain, severe upper back pain.

## 2023-11-20 ENCOUNTER — LAB VISIT (OUTPATIENT)
Dept: LAB | Facility: HOSPITAL | Age: 43
End: 2023-11-20
Attending: STUDENT IN AN ORGANIZED HEALTH CARE EDUCATION/TRAINING PROGRAM
Payer: COMMERCIAL

## 2023-11-20 DIAGNOSIS — N93.8 DUB (DYSFUNCTIONAL UTERINE BLEEDING): ICD-10-CM

## 2023-11-20 DIAGNOSIS — N92.0 MENORRHAGIA WITH REGULAR CYCLE: ICD-10-CM

## 2023-11-20 LAB
ABO + RH BLD: NORMAL
BASOPHILS # BLD AUTO: 0.07 K/UL (ref 0–0.2)
BASOPHILS NFR BLD: 0.7 % (ref 0–1.9)
BLD GP AB SCN CELLS X3 SERPL QL: NORMAL
DIFFERENTIAL METHOD: NORMAL
EOSINOPHIL # BLD AUTO: 0.1 K/UL (ref 0–0.5)
EOSINOPHIL NFR BLD: 0.7 % (ref 0–8)
ERYTHROCYTE [DISTWIDTH] IN BLOOD BY AUTOMATED COUNT: 13.3 % (ref 11.5–14.5)
HCT VFR BLD AUTO: 41.7 % (ref 37–48.5)
HGB BLD-MCNC: 14 G/DL (ref 12–16)
IMM GRANULOCYTES # BLD AUTO: 0.03 K/UL (ref 0–0.04)
IMM GRANULOCYTES NFR BLD AUTO: 0.3 % (ref 0–0.5)
LYMPHOCYTES # BLD AUTO: 2.3 K/UL (ref 1–4.8)
LYMPHOCYTES NFR BLD: 22.7 % (ref 18–48)
MCH RBC QN AUTO: 28.6 PG (ref 27–31)
MCHC RBC AUTO-ENTMCNC: 33.6 G/DL (ref 32–36)
MCV RBC AUTO: 85 FL (ref 82–98)
MONOCYTES # BLD AUTO: 0.8 K/UL (ref 0.3–1)
MONOCYTES NFR BLD: 8.1 % (ref 4–15)
NEUTROPHILS # BLD AUTO: 6.9 K/UL (ref 1.8–7.7)
NEUTROPHILS NFR BLD: 67.5 % (ref 38–73)
NRBC BLD-RTO: 0 /100 WBC
PLATELET # BLD AUTO: 333 K/UL (ref 150–450)
PMV BLD AUTO: 10.8 FL (ref 9.2–12.9)
RBC # BLD AUTO: 4.89 M/UL (ref 4–5.4)
SPECIMEN OUTDATE: NORMAL
WBC # BLD AUTO: 10.16 K/UL (ref 3.9–12.7)

## 2023-11-20 PROCEDURE — 85025 COMPLETE CBC W/AUTO DIFF WBC: CPT | Performed by: STUDENT IN AN ORGANIZED HEALTH CARE EDUCATION/TRAINING PROGRAM

## 2023-11-20 PROCEDURE — 36415 COLL VENOUS BLD VENIPUNCTURE: CPT | Performed by: STUDENT IN AN ORGANIZED HEALTH CARE EDUCATION/TRAINING PROGRAM

## 2023-11-20 PROCEDURE — 86850 RBC ANTIBODY SCREEN: CPT | Performed by: STUDENT IN AN ORGANIZED HEALTH CARE EDUCATION/TRAINING PROGRAM

## 2023-11-21 ENCOUNTER — ANESTHESIA EVENT (OUTPATIENT)
Dept: SURGERY | Facility: HOSPITAL | Age: 43
End: 2023-11-21
Payer: COMMERCIAL

## 2023-11-21 NOTE — ANESTHESIA PREPROCEDURE EVALUATION
2023  Alyssa Robledo is a 43 y.o., female scheduled for hysterosocpy   H&P completed 22    Past Medical History:   Diagnosis Date    Anxiety     Closed fracture of head of metatarsal, left, with malunion, subsequent encounter 2020    GERD (gastroesophageal reflux disease)     Pain from implanted hardware 2022     Past Surgical History:   Procedure Laterality Date     SECTION      2    CORRECTION OF HAMMER TOE Left 2020    Procedure: CORRECTION, HAMMER TOE;  Surgeon: Ha Goetz DPM;  Location: [facility] OR;  Service: Podiatry;  Laterality: Left;  mini c-arm, Arthrex toe implant, toes 3,4Mallory confirmed 2020 0745 KB    DILATION AND CURETTAGE OF UTERUS      FOOT HARDWARE REMOVAL Left 2022    Procedure: REMOVAL, HARDWARE, FOOT;  Surgeon: Ha Goetz DPM;  Location: [facility] OR;  Service: Podiatry;  Laterality: Left;    OSTEOTOMY OF METATARSAL BONE Left 2020    Procedure: OSTEOTOMY, METATARSAL BONE;  Surgeon: aH Goetz DPM;  Location: [facility] OR;  Service: Podiatry;  Laterality: Left;  Arthrex screws, dorsal exostectomy foot/deep peroneal nerve release included (Cuca notified 10/26, EF)       Pre-op Assessment    I have reviewed the Patient Summary Reports.     I have reviewed the Nursing Notes. I have reviewed the NPO Status.   I have reviewed the Medications.     Review of Systems  Anesthesia Hx:   History of prior surgery of interest to airway management or planning:           Personal Hx of Anesthesia complications, Post-Operative Nausea/Vomiting, in the past, but not with recent anesthetics / prophylaxis                    Social:  Non-Smoker, Social Alcohol Use       Hematology/Oncology:  Hematology Normal                                     Cardiovascular:  Exercise tolerance: good   Hypertension, well controlled       Denies Angina.                                   Pulmonary:  Pulmonary Normal      Denies Shortness of breath.                  Renal/:  Renal/ Normal                 Hepatic/GI:     GERD, well controlled             Neurological:  Denies TIA.  Denies CVA. Neuromuscular Disease,  Headaches Denies Seizures.                                Endocrine:  Endocrine Normal          Obesity / BMI > 30  Psych:   anxiety depression                Physical Exam    Dental:  Intact    EKG 5/24/22  Normal sinus rhythm   Prolonged QT   Abnormal ECG   When compared with ECG of 29-OCT-2020 11:30,   Incomplete right bundle branch block is no longer Present   Confirmed by Jermaine Fontanez III, MD ([id number]) on 5/24/2022 4:30:47 PM     Anesthesia Plan  Type of Anesthesia, risks & benefits discussed:    Anesthesia Type: MAC, Regional  Intra-op Monitoring Plan: Standard ASA Monitors  Post Op Pain Control Plan: multimodal analgesia  Induction:  IV  Airway Plan: Direct  Informed Consent: Informed consent signed with the Patient and all parties understand the risks and agree with anesthesia plan.  All questions answered.   ASA Score: 2  Anesthesia Plan Notes: Anesthesia consent to be signed prior to surgery 6/8/22      Ready For Surgery From Anesthesia Perspective.     .

## 2023-11-22 ENCOUNTER — HOSPITAL ENCOUNTER (OUTPATIENT)
Facility: HOSPITAL | Age: 43
Discharge: HOME | End: 2023-11-22
Attending: STUDENT IN AN ORGANIZED HEALTH CARE EDUCATION/TRAINING PROGRAM | Admitting: STUDENT IN AN ORGANIZED HEALTH CARE EDUCATION/TRAINING PROGRAM
Payer: COMMERCIAL

## 2023-11-22 ENCOUNTER — ANESTHESIA (OUTPATIENT)
Dept: SURGERY | Facility: HOSPITAL | Age: 43
End: 2023-11-22
Payer: COMMERCIAL

## 2023-11-22 VITALS
OXYGEN SATURATION: 100 % | HEART RATE: 83 BPM | TEMPERATURE: 97 F | SYSTOLIC BLOOD PRESSURE: 126 MMHG | RESPIRATION RATE: 16 BRPM | DIASTOLIC BLOOD PRESSURE: 69 MMHG

## 2023-11-22 DIAGNOSIS — N92.0 MENORRHAGIA WITH REGULAR CYCLE: Primary | ICD-10-CM

## 2023-11-22 PROCEDURE — 58661 PR LAP,RMV  ADNEXAL STRUCTURE: ICD-10-PCS | Mod: 50,,, | Performed by: STUDENT IN AN ORGANIZED HEALTH CARE EDUCATION/TRAINING PROGRAM

## 2023-11-22 PROCEDURE — 88305 TISSUE EXAM BY PATHOLOGIST: CPT | Mod: 26,,, | Performed by: PATHOLOGY

## 2023-11-22 PROCEDURE — 36000709 HC OR TIME LEV III EA ADD 15 MIN: Performed by: STUDENT IN AN ORGANIZED HEALTH CARE EDUCATION/TRAINING PROGRAM

## 2023-11-22 PROCEDURE — 63600175 PHARM REV CODE 636 W HCPCS: Performed by: NURSE ANESTHETIST, CERTIFIED REGISTERED

## 2023-11-22 PROCEDURE — 37000009 HC ANESTHESIA EA ADD 15 MINS: Performed by: STUDENT IN AN ORGANIZED HEALTH CARE EDUCATION/TRAINING PROGRAM

## 2023-11-22 PROCEDURE — 58563 PR HYSTEROSCOPY,W/ENDOMETRIAL ABLATION: ICD-10-PCS | Mod: 51,,, | Performed by: STUDENT IN AN ORGANIZED HEALTH CARE EDUCATION/TRAINING PROGRAM

## 2023-11-22 PROCEDURE — 88302 TISSUE EXAM BY PATHOLOGIST: CPT | Performed by: PATHOLOGY

## 2023-11-22 PROCEDURE — D9220AH HC ANESTHESIA PROFESSIONAL FEE: Mod: QZ,P2 | Performed by: NURSE ANESTHETIST, CERTIFIED REGISTERED

## 2023-11-22 PROCEDURE — 71000033 HC RECOVERY, INTIAL HOUR: Performed by: STUDENT IN AN ORGANIZED HEALTH CARE EDUCATION/TRAINING PROGRAM

## 2023-11-22 PROCEDURE — 00840 ANES IPER PX LOWER ABD NOS: CPT | Mod: QZ,P2 | Performed by: NURSE ANESTHETIST, CERTIFIED REGISTERED

## 2023-11-22 PROCEDURE — 88305 TISSUE EXAM BY PATHOLOGIST: ICD-10-PCS | Mod: 26,,, | Performed by: PATHOLOGY

## 2023-11-22 PROCEDURE — 58661 LAPAROSCOPY REMOVE ADNEXA: CPT | Mod: 50,,, | Performed by: STUDENT IN AN ORGANIZED HEALTH CARE EDUCATION/TRAINING PROGRAM

## 2023-11-22 PROCEDURE — 27201423 OPTIME MED/SURG SUP & DEVICES STERILE SUPPLY: Performed by: STUDENT IN AN ORGANIZED HEALTH CARE EDUCATION/TRAINING PROGRAM

## 2023-11-22 PROCEDURE — 37000008 HC ANESTHESIA 1ST 15 MINUTES: Performed by: STUDENT IN AN ORGANIZED HEALTH CARE EDUCATION/TRAINING PROGRAM

## 2023-11-22 PROCEDURE — 88302 PR  SURG PATH,LEVEL II: ICD-10-PCS | Mod: 26,,, | Performed by: PATHOLOGY

## 2023-11-22 PROCEDURE — 36000708 HC OR TIME LEV III 1ST 15 MIN: Performed by: STUDENT IN AN ORGANIZED HEALTH CARE EDUCATION/TRAINING PROGRAM

## 2023-11-22 PROCEDURE — 88302 TISSUE EXAM BY PATHOLOGIST: CPT | Mod: 26,,, | Performed by: PATHOLOGY

## 2023-11-22 PROCEDURE — 25000003 PHARM REV CODE 250: Performed by: NURSE ANESTHETIST, CERTIFIED REGISTERED

## 2023-11-22 PROCEDURE — 88305 TISSUE EXAM BY PATHOLOGIST: CPT | Performed by: PATHOLOGY

## 2023-11-22 PROCEDURE — 58563 HYSTEROSCOPY ABLATION: CPT | Mod: 51,,, | Performed by: STUDENT IN AN ORGANIZED HEALTH CARE EDUCATION/TRAINING PROGRAM

## 2023-11-22 RX ORDER — SCOLOPAMINE TRANSDERMAL SYSTEM 1 MG/1
PATCH, EXTENDED RELEASE TRANSDERMAL
Status: DISCONTINUED | OUTPATIENT
Start: 2023-11-22 | End: 2023-11-22

## 2023-11-22 RX ORDER — PROPOFOL 10 MG/ML
VIAL (ML) INTRAVENOUS
Status: DISCONTINUED | OUTPATIENT
Start: 2023-11-22 | End: 2023-11-22

## 2023-11-22 RX ORDER — ONDANSETRON HYDROCHLORIDE 2 MG/ML
INJECTION, SOLUTION INTRAMUSCULAR; INTRAVENOUS
Status: DISCONTINUED | OUTPATIENT
Start: 2023-11-22 | End: 2023-11-22

## 2023-11-22 RX ORDER — FENTANYL CITRATE 50 UG/ML
INJECTION, SOLUTION INTRAMUSCULAR; INTRAVENOUS
Status: DISCONTINUED | OUTPATIENT
Start: 2023-11-22 | End: 2023-11-22

## 2023-11-22 RX ORDER — DEXAMETHASONE SODIUM PHOSPHATE 4 MG/ML
INJECTION, SOLUTION INTRA-ARTICULAR; INTRALESIONAL; INTRAMUSCULAR; INTRAVENOUS; SOFT TISSUE
Status: DISCONTINUED | OUTPATIENT
Start: 2023-11-22 | End: 2023-11-22

## 2023-11-22 RX ORDER — FAMOTIDINE 10 MG/ML
INJECTION INTRAVENOUS
Status: DISCONTINUED | OUTPATIENT
Start: 2023-11-22 | End: 2023-11-22

## 2023-11-22 RX ORDER — MIDAZOLAM HYDROCHLORIDE 1 MG/ML
INJECTION INTRAMUSCULAR; INTRAVENOUS
Status: DISCONTINUED | OUTPATIENT
Start: 2023-11-22 | End: 2023-11-22

## 2023-11-22 RX ORDER — IBUPROFEN 800 MG/1
800 TABLET ORAL 3 TIMES DAILY
Qty: 21 TABLET | Refills: 0 | Status: SHIPPED | OUTPATIENT
Start: 2023-11-22 | End: 2023-11-29

## 2023-11-22 RX ORDER — LIDOCAINE HYDROCHLORIDE 20 MG/ML
INJECTION, SOLUTION EPIDURAL; INFILTRATION; INTRACAUDAL; PERINEURAL
Status: DISCONTINUED | OUTPATIENT
Start: 2023-11-22 | End: 2023-11-22

## 2023-11-22 RX ORDER — ROCURONIUM BROMIDE 10 MG/ML
INJECTION, SOLUTION INTRAVENOUS
Status: DISCONTINUED | OUTPATIENT
Start: 2023-11-22 | End: 2023-11-22

## 2023-11-22 RX ORDER — SODIUM CHLORIDE, SODIUM LACTATE, POTASSIUM CHLORIDE, CALCIUM CHLORIDE 600; 310; 30; 20 MG/100ML; MG/100ML; MG/100ML; MG/100ML
INJECTION, SOLUTION INTRAVENOUS CONTINUOUS PRN
Status: DISCONTINUED | OUTPATIENT
Start: 2023-11-22 | End: 2023-11-22

## 2023-11-22 RX ORDER — KETOROLAC TROMETHAMINE 30 MG/ML
INJECTION, SOLUTION INTRAMUSCULAR; INTRAVENOUS
Status: DISCONTINUED | OUTPATIENT
Start: 2023-11-22 | End: 2023-11-22

## 2023-11-22 RX ORDER — OXYCODONE HYDROCHLORIDE 5 MG/1
5 TABLET ORAL EVERY 4 HOURS PRN
Qty: 16 TABLET | Refills: 0 | Status: SHIPPED | OUTPATIENT
Start: 2023-11-22 | End: 2023-11-26

## 2023-11-22 RX ADMIN — SODIUM CHLORIDE, SODIUM LACTATE, POTASSIUM CHLORIDE, AND CALCIUM CHLORIDE: .6; .31; .03; .02 INJECTION, SOLUTION INTRAVENOUS at 10:11

## 2023-11-22 RX ADMIN — LIDOCAINE HYDROCHLORIDE 60 MG: 20 INJECTION, SOLUTION EPIDURAL; INFILTRATION; INTRACAUDAL; PERINEURAL at 09:11

## 2023-11-22 RX ADMIN — SODIUM CHLORIDE, SODIUM LACTATE, POTASSIUM CHLORIDE, AND CALCIUM CHLORIDE: .6; .31; .03; .02 INJECTION, SOLUTION INTRAVENOUS at 09:11

## 2023-11-22 RX ADMIN — MIDAZOLAM HYDROCHLORIDE 2 MG: 1 INJECTION, SOLUTION INTRAMUSCULAR; INTRAVENOUS at 09:11

## 2023-11-22 RX ADMIN — SCOPOLAMINE 1 PATCH: 1 PATCH, EXTENDED RELEASE TRANSDERMAL at 09:11

## 2023-11-22 RX ADMIN — PROPOFOL 150 MG: 10 INJECTION, EMULSION INTRAVENOUS at 09:11

## 2023-11-22 RX ADMIN — ROCURONIUM BROMIDE 30 MG: 10 INJECTION, SOLUTION INTRAVENOUS at 09:11

## 2023-11-22 RX ADMIN — DEXAMETHASONE SODIUM PHOSPHATE 8 MG: 4 INJECTION, SOLUTION INTRAMUSCULAR; INTRAVENOUS at 09:11

## 2023-11-22 RX ADMIN — ONDANSETRON 8 MG: 2 INJECTION INTRAMUSCULAR; INTRAVENOUS at 09:11

## 2023-11-22 RX ADMIN — KETOROLAC TROMETHAMINE 30 MG: 30 INJECTION, SOLUTION INTRAMUSCULAR at 10:11

## 2023-11-22 RX ADMIN — FENTANYL CITRATE 50 MCG: 0.05 INJECTION, SOLUTION INTRAMUSCULAR; INTRAVENOUS at 09:11

## 2023-11-22 RX ADMIN — FENTANYL CITRATE 100 MCG: 0.05 INJECTION, SOLUTION INTRAMUSCULAR; INTRAVENOUS at 09:11

## 2023-11-22 RX ADMIN — FENTANYL CITRATE 50 MCG: 0.05 INJECTION, SOLUTION INTRAMUSCULAR; INTRAVENOUS at 10:11

## 2023-11-22 RX ADMIN — FAMOTIDINE 20 MG: 10 INJECTION INTRAVENOUS at 09:11

## 2023-11-22 RX ADMIN — SUGAMMADEX 200 MG: 100 INJECTION, SOLUTION INTRAVENOUS at 10:11

## 2023-11-22 NOTE — DISCHARGE SUMMARY
[facility]  Obstetrics & Gynecology  Discharge Summary    Patient Name: Alyssa Robledo  MRN: [id number]  Admission Date: 11/22/2023  Hospital Length of Stay: 0 days  Discharge Date and Time:  11/22/2023 11:04 AM  Attending Physician: Sherine Springer   Discharging Provider: Sherine Springer MD  Primary Care Provider: Alba Mayers DO    HPI:  No notes on file    Hospital Course:  Patient presented for scheduled surgery, see op note.  She was transferred to PACU then to the floor to recover.  No acute events.  She was discharged home when meeting all discharge criteria.      Goals of Care Treatment Preferences:  Code Status: Full Code      Procedure(s) (LRB):  HYSTEROSCOPY, WITH DILATION AND CURETTAGE OF UTERUS (N/A)  ABLATION, ENDOMETRIUM, THERMAL (N/A)  SALPINGECTOMY, LAPAROSCOPIC (Bilateral)         Significant Diagnostic Studies: N/A      Pending Diagnostic Studies:       Procedure Component Value Units Date/Time    Specimen to Pathology, Surgery Gynecology and Obstetrics [[id number]] Collected: 11/22/23 1040    Order Status: Sent Lab Status: In process Updated: 11/22/23 1041    Specimen: Tissue     Specimen to Pathology, Surgery Gynecology and Obstetrics [[id number]] Collected: 11/22/23 1016    Order Status: Sent Lab Status: In process Updated: 11/22/23 1016    Specimen: Tissue           Final Active Diagnoses:    Diagnosis Date Noted POA    PRINCIPAL PROBLEM:  Menorrhagia with regular cycle [N92.0] 11/22/2023 Yes      Problems Resolved During this Admission:        Discharged Condition: good    Disposition: Home or Self Care    Follow Up:   Follow-up Information       Sherine Springer MD Follow up in 2 week(s).    Specialty: Obstetrics and Gynecology  Contact information:  [address]   [address]   [facility] [address]  [phone]                           Patient Instructions:      Notify your health care provider if you experience any of the following:  temperature  >100.4     Notify your health care provider if you experience any of the following:  persistent nausea and vomiting or diarrhea     Notify your health care provider if you experience any of the following:  severe uncontrolled pain     Notify your health care provider if you experience any of the following:  redness, tenderness, or signs of infection (pain, swelling, redness, odor or green/yellow discharge around incision site)     Notify your health care provider if you experience any of the following:  difficulty breathing or increased cough     Notify your health care provider if you experience any of the following:  severe persistent headache     Notify your health care provider if you experience any of the following:  worsening rash     Notify your health care provider if you experience any of the following:  persistent dizziness, light-headedness, or visual disturbances     Notify your health care provider if you experience any of the following:  increased confusion or weakness     Activity as tolerated     Medications:  Reconciled Home Medications:      Medication List        START taking these medications      ibuprofen 800 MG tablet  Commonly known as: ADVIL,MOTRIN  Take 1 tablet (800 mg total) by mouth 3 (three) times daily. for 7 days     oxyCODONE 5 MG immediate release tablet  Commonly known as: ROXICODONE  Take 1 tablet (5 mg total) by mouth every 4 (four) hours as needed for Pain.            CONTINUE taking these medications      butalbital-acetaminophen-caffeine -40 mg -40 mg per tablet  Commonly known as: FIORICET, ESGIC  Take 1 tablet by mouth every 4 (four) hours as needed for Pain.     cetirizine 10 MG tablet  Commonly known as: ZYRTEC  Take 10 mg by mouth once daily.     chlorthalidone 50 MG Tab  Commonly known as: HYGROTEN  Take 1 tablet (50 mg total) by mouth once daily.     drospirenone-ethinyl estradioL 3-0.03 mg per tablet  Commonly known as: OCELLA  Take 1 tablet by mouth once  daily.     esomeprazole 20 MG capsule  Commonly known as: NEXIUM  Take 20 mg by mouth.     FLUoxetine 40 MG capsule  TAKE 1 CAPSULE BY MOUTH ONCE DAILY     Lactobacillus acidophilus 10 billion cell Cap  Take 1 tablet by mouth once daily.     ondansetron 4 MG tablet  Commonly known as: ZOFRAN  Take 1 tablet (4 mg total) by mouth every 6 (six) hours as needed for Nausea.     potassium chloride SA 20 MEQ tablet  Commonly known as: K-DUR,KLOR-CON  Take 1 tablet (20 mEq total) by mouth once daily. for 365 doses              Sherine Springer MD  Obstetrics & Gynecology  [facility]

## 2023-11-22 NOTE — INTERVAL H&P NOTE
The patient has been examined and the H&P has been reviewed:        I concur with the findings and no changes have occurred since H&P was written.        Patient cleared for Anesthesia: General        Anesthesia/Surgery risks, benefits and alternative options discussed and understood by patient/family.      Active Hospital Problems    Diagnosis  POA    *Menorrhagia with regular cycle [N92.0]  Yes      Resolved Hospital Problems   No resolved problems to display.

## 2023-11-22 NOTE — ANESTHESIA PROCEDURE NOTES
Intubation    Date/Time: 11/22/2023 9:57 AM    Performed by: Boogie Fleming CRNA  Authorized by: Boogie Fleming CRNA    Intubation:     Induction:  Intravenous    Intubated:  Postinduction    Mask Ventilation:  Easy mask    Attempts:  1    Attempted By:  CRNA    Method of Intubation:  Video laryngoscopy    Blade:  Schulz 4    Laryngeal View Grade: Grade I - full view of cords      Difficult Airway Encountered?: No      Complications:  None    Airway Device:  Oral endotracheal tube    Airway Device Size:  7.5    Style/Cuff Inflation:  Cuffed (inflated to minimal occlusive pressure)    Tube secured:  21    Secured at:  The lips    Placement Verified By:  Capnometry    Complicating Factors:  None    Findings Post-Intubation:  BS equal bilateral and atraumatic/condition of teeth unchanged

## 2023-11-22 NOTE — TRANSFER OF CARE
Anesthesia Transfer of Care Note    Patient: Alyssa Robledo    Procedure(s) Performed: Procedure(s) (LRB):  HYSTEROSCOPY, WITH DILATION AND CURETTAGE OF UTERUS (N/A)  ABLATION, ENDOMETRIUM, THERMAL (N/A)  SALPINGECTOMY, LAPAROSCOPIC (Bilateral)    Patient location: PACU    Anesthesia Type: general    Transport from OR: Transported from OR on 6-10 L/min O2 by face mask with adequate spontaneous ventilation    Post pain: adequate analgesia    Post assessment: no apparent anesthetic complications and tolerated procedure well    Post vital signs: stable    Level of consciousness: sedated    Nausea/Vomiting: no nausea/vomiting    Complications: none    Transfer of care protocol was followed      Last vitals: Visit Vitals  /69 (BP Location: Left arm, Patient Position: Lying)   Pulse 83   Temp 36.1 °C (97 °F) (Skin)   Resp 16   LMP 10/11/2023   SpO2 100%   Breastfeeding No

## 2023-11-22 NOTE — ANESTHESIA POSTPROCEDURE EVALUATION
Anesthesia Post Evaluation    Patient: Alyssa Robledo    Procedure(s) Performed: Procedure(s) (LRB):  HYSTEROSCOPY, WITH DILATION AND CURETTAGE OF UTERUS (N/A)  ABLATION, ENDOMETRIUM, THERMAL (N/A)  SALPINGECTOMY, LAPAROSCOPIC (Bilateral)    Final Anesthesia Type: general      Patient location during evaluation: PACU  Patient participation: Yes- Able to Participate  Level of consciousness: awake and alert and oriented  Post-procedure vital signs: reviewed and stable  Pain management: adequate  Airway patency: patent    PONV status at discharge: No PONV  Anesthetic complications: no      Cardiovascular status: blood pressure returned to baseline and hemodynamically stable  Respiratory status: unassisted, spontaneous ventilation and room air  Hydration status: euvolemic  Follow-up not needed.          Vitals Value Taken Time   /69 11/22/23 1132   Temp  11/22/23 1322   Pulse 83 11/22/23 1132   Resp 16 11/22/23 1132   SpO2 100 % 11/22/23 1132         Event Time   Out of Recovery 11:39:00         Pain/Jacoby Score: Jacoby Score: 10 (11/22/2023 11:39 AM)  Modified Jacoby Score: 20 (11/22/2023 11:39 AM)

## 2023-11-22 NOTE — OP NOTE
Date: 11/22/2023      Procedure:   1. Hysteroscopy D&C  2. Novasure ablation   3. Laparoscopic bilateral salpingectomy    Surgeon:Sherine Springer MD    Assistant: n/a    Pre-op Dx:   1. AUB  2. Desires sterilization    Post-op Dx: same    Anesthesia: General     EBL: 10 cc    DVT prophylaxis: Bilateral SCDs    Perioperative antibiotics: N/A    Specimen: Endometrial curetting, bilateral fallopian tubes    Operative Findings: In the abdomin, there was normal appearing bilateral fallopian tubes, ovaries and uterus. There was midline adhesions of the omentum to the anterior abdominal wall near the umbilicus. Normal appearing intrauterine cavity. Bilateral tubal ostia identified. Uterus sounded to 8 cm. Uterine cavity length was 4.5 cm. Cervical length was 4.5 cm.  Uterine cavity width was determined to be 3 cm. Ablation power was 74 58 Pepe. Total ablation time was 58 seconds.      OPERATIVE NOTE:  The patient was taken to the operating room were anesthesia was found to be adequate. She was prepped and draped in the normal sterile fashion in the dorsal lithotomy position. An in and out catheter was used to drain the bladder.      A 5 mm skin incision was made in the patient's infraumbilical region. Veress needle was inserted into this location with proper intraperitoneal placement, confirmed with the saline drop test and low intraabdominal pressure.  The abdomen was  insufflated. The 5 mm infraumbilical trocar was inserted under direct laparoscopic visualization. Next, two 5 mm skin incisions were made in the patient's lower lateral abdomen bilaterally. 10 mm trocars were placed in the direct laparoscopic visualization. The patient was placed in steep Trendelenburg position. The abdomen and pelvis were surveyed with the above noted findings.  The right fallopian was grasped and elevated by my assistant and the Voyant device was used to ligate the tube. The same procedure was performed on the patient's left  side. The tubes were removed from the abdomin. There was some bleeding within the omentum at the midline and the Voyant device was used to achieve hemostasis.  Next, the ports were removed under direct laparoscopic visualization with excellent hemostasis noted. Skin incisions were closed using a #3-0 Vicryl in a running fashion.  Dermobond was placed on the skin incision. All instruments were removed.     A bivalve speculum was inserted into the posterior vagina and the cervix was visualized. The cervix was grasped with tenaculum clamp. The cervical length and cavity then were then measured and obtained. The cervix was then dilated up to 8 mm with the Hegar dilatators. The hysteroscope was then inserted with the above noted findings. A sharp curette was preformed. The NovaSure ablation device was set to 4.5 cm for uterine cavity length. The NovaSure ablation device was then inserted into the patient's uterus and uterine cavity width was determined according to 's instructions. This was determined to be 4.5 cm. Cavity assessment was then performed. This was passed. Ablation was initiated at a power of 74 Pepe. Total ablation time was 58 seconds. The NovaSure ablation device was then withdrawn from the patient's uterus. It was noted to be appropriately charred. The allis clampwas removed from the anterior lip of the cervix. Excellent hemostasis was noted. The speculum was removed from the patient's vagina. Sponge, lap, needle, and instrument counts were correct x2. The patient was taken to the recovery area awake and in stable condition.

## 2023-11-27 LAB
FINAL PATHOLOGIC DIAGNOSIS: NORMAL
FINAL PATHOLOGIC DIAGNOSIS: NORMAL
GROSS: NORMAL
GROSS: NORMAL
Lab: NORMAL
Lab: NORMAL

## 2023-11-28 DIAGNOSIS — Z00.00 ANNUAL PHYSICAL EXAM: Primary | ICD-10-CM

## 2023-12-08 ENCOUNTER — OFFICE VISIT (OUTPATIENT)
Dept: FAMILY MEDICINE | Facility: CLINIC | Age: 43
End: 2023-12-08
Payer: COMMERCIAL

## 2023-12-08 VITALS
SYSTOLIC BLOOD PRESSURE: 158 MMHG | DIASTOLIC BLOOD PRESSURE: 72 MMHG | HEART RATE: 88 BPM | WEIGHT: 207.69 LBS | OXYGEN SATURATION: 98 % | HEIGHT: 69 IN | BODY MASS INDEX: 30.76 KG/M2

## 2023-12-08 DIAGNOSIS — E78.2 MIXED HYPERLIPIDEMIA: ICD-10-CM

## 2023-12-08 DIAGNOSIS — Z00.00 ENCOUNTER FOR MEDICAL EXAMINATION TO ESTABLISH CARE: ICD-10-CM

## 2023-12-08 DIAGNOSIS — R74.01 TRANSAMINITIS: ICD-10-CM

## 2023-12-08 DIAGNOSIS — T14.8XXA ABRASION OF SKIN: ICD-10-CM

## 2023-12-08 DIAGNOSIS — Z00.00 ANNUAL PHYSICAL EXAM: ICD-10-CM

## 2023-12-08 DIAGNOSIS — I10 ESSENTIAL HYPERTENSION: ICD-10-CM

## 2023-12-08 DIAGNOSIS — Z23 NEED FOR DIPHTHERIA-TETANUS-PERTUSSIS (TDAP) VACCINE: ICD-10-CM

## 2023-12-08 DIAGNOSIS — E87.6 HYPOKALEMIA DUE TO EXCESSIVE RENAL LOSS OF POTASSIUM: ICD-10-CM

## 2023-12-08 PROCEDURE — 99999 PR PBB SHADOW E&M-EST. PATIENT-LVL IV: CPT | Mod: PBBFAC,,, | Performed by: STUDENT IN AN ORGANIZED HEALTH CARE EDUCATION/TRAINING PROGRAM

## 2023-12-08 PROCEDURE — 4010F ACE/ARB THERAPY RXD/TAKEN: CPT | Mod: CPTII,S$GLB,, | Performed by: STUDENT IN AN ORGANIZED HEALTH CARE EDUCATION/TRAINING PROGRAM

## 2023-12-08 PROCEDURE — 3044F HG A1C LEVEL LT 7.0%: CPT | Mod: CPTII,S$GLB,, | Performed by: STUDENT IN AN ORGANIZED HEALTH CARE EDUCATION/TRAINING PROGRAM

## 2023-12-08 PROCEDURE — 4010F PR ACE/ARB THEARPY RXD/TAKEN: ICD-10-PCS | Mod: CPTII,S$GLB,, | Performed by: STUDENT IN AN ORGANIZED HEALTH CARE EDUCATION/TRAINING PROGRAM

## 2023-12-08 PROCEDURE — 90471 IMMUNIZATION ADMIN: CPT | Mod: S$GLB,,, | Performed by: STUDENT IN AN ORGANIZED HEALTH CARE EDUCATION/TRAINING PROGRAM

## 2023-12-08 PROCEDURE — 3077F PR MOST RECENT SYSTOLIC BLOOD PRESSURE >= 140 MM HG: ICD-10-PCS | Mod: CPTII,S$GLB,, | Performed by: STUDENT IN AN ORGANIZED HEALTH CARE EDUCATION/TRAINING PROGRAM

## 2023-12-08 PROCEDURE — 3078F DIAST BP <80 MM HG: CPT | Mod: CPTII,S$GLB,, | Performed by: STUDENT IN AN ORGANIZED HEALTH CARE EDUCATION/TRAINING PROGRAM

## 2023-12-08 PROCEDURE — 90715 TDAP VACCINE 7 YRS/> IM: CPT | Mod: S$GLB,,, | Performed by: STUDENT IN AN ORGANIZED HEALTH CARE EDUCATION/TRAINING PROGRAM

## 2023-12-08 PROCEDURE — 99386 PR PREVENTIVE VISIT,NEW,40-64: ICD-10-PCS | Mod: 25,S$GLB,, | Performed by: STUDENT IN AN ORGANIZED HEALTH CARE EDUCATION/TRAINING PROGRAM

## 2023-12-08 PROCEDURE — 90715 TDAP VACCINE GREATER THAN OR EQUAL TO 7YO IM: ICD-10-PCS | Mod: S$GLB,,, | Performed by: STUDENT IN AN ORGANIZED HEALTH CARE EDUCATION/TRAINING PROGRAM

## 2023-12-08 PROCEDURE — 1159F MED LIST DOCD IN RCRD: CPT | Mod: CPTII,S$GLB,, | Performed by: STUDENT IN AN ORGANIZED HEALTH CARE EDUCATION/TRAINING PROGRAM

## 2023-12-08 PROCEDURE — 1160F RVW MEDS BY RX/DR IN RCRD: CPT | Mod: CPTII,S$GLB,, | Performed by: STUDENT IN AN ORGANIZED HEALTH CARE EDUCATION/TRAINING PROGRAM

## 2023-12-08 PROCEDURE — 1159F PR MEDICATION LIST DOCUMENTED IN MEDICAL RECORD: ICD-10-PCS | Mod: CPTII,S$GLB,, | Performed by: STUDENT IN AN ORGANIZED HEALTH CARE EDUCATION/TRAINING PROGRAM

## 2023-12-08 PROCEDURE — 3077F SYST BP >= 140 MM HG: CPT | Mod: CPTII,S$GLB,, | Performed by: STUDENT IN AN ORGANIZED HEALTH CARE EDUCATION/TRAINING PROGRAM

## 2023-12-08 PROCEDURE — 99386 PREV VISIT NEW AGE 40-64: CPT | Mod: 25,S$GLB,, | Performed by: STUDENT IN AN ORGANIZED HEALTH CARE EDUCATION/TRAINING PROGRAM

## 2023-12-08 PROCEDURE — 3008F PR BODY MASS INDEX (BMI) DOCUMENTED: ICD-10-PCS | Mod: CPTII,S$GLB,, | Performed by: STUDENT IN AN ORGANIZED HEALTH CARE EDUCATION/TRAINING PROGRAM

## 2023-12-08 PROCEDURE — 3008F BODY MASS INDEX DOCD: CPT | Mod: CPTII,S$GLB,, | Performed by: STUDENT IN AN ORGANIZED HEALTH CARE EDUCATION/TRAINING PROGRAM

## 2023-12-08 PROCEDURE — 3044F PR MOST RECENT HEMOGLOBIN A1C LEVEL <7.0%: ICD-10-PCS | Mod: CPTII,S$GLB,, | Performed by: STUDENT IN AN ORGANIZED HEALTH CARE EDUCATION/TRAINING PROGRAM

## 2023-12-08 PROCEDURE — 99999 PR PBB SHADOW E&M-EST. PATIENT-LVL IV: ICD-10-PCS | Mod: PBBFAC,,, | Performed by: STUDENT IN AN ORGANIZED HEALTH CARE EDUCATION/TRAINING PROGRAM

## 2023-12-08 PROCEDURE — 1160F PR REVIEW ALL MEDS BY PRESCRIBER/CLIN PHARMACIST DOCUMENTED: ICD-10-PCS | Mod: CPTII,S$GLB,, | Performed by: STUDENT IN AN ORGANIZED HEALTH CARE EDUCATION/TRAINING PROGRAM

## 2023-12-08 PROCEDURE — 3078F PR MOST RECENT DIASTOLIC BLOOD PRESSURE < 80 MM HG: ICD-10-PCS | Mod: CPTII,S$GLB,, | Performed by: STUDENT IN AN ORGANIZED HEALTH CARE EDUCATION/TRAINING PROGRAM

## 2023-12-08 PROCEDURE — 90471 TDAP VACCINE GREATER THAN OR EQUAL TO 7YO IM: ICD-10-PCS | Mod: S$GLB,,, | Performed by: STUDENT IN AN ORGANIZED HEALTH CARE EDUCATION/TRAINING PROGRAM

## 2023-12-08 RX ORDER — MUPIROCIN 20 MG/G
OINTMENT TOPICAL 2 TIMES DAILY
Qty: 30 G | Refills: 0 | Status: SHIPPED | OUTPATIENT
Start: 2023-12-08

## 2023-12-08 RX ORDER — LOSARTAN POTASSIUM 25 MG/1
25 TABLET ORAL DAILY
Qty: 90 TABLET | Refills: 3 | Status: SHIPPED | OUTPATIENT
Start: 2023-12-08 | End: 2024-01-03

## 2023-12-08 NOTE — PROGRESS NOTES
[facility] Note    Chief Complaint      Chief Complaint   Patient presents with    [facility]    Annual Exam     History of Present Illness      HPI    Alyssa Robledo is a 43 y.o. female with sHTN, chronic hypokalemia (due to iatrogenic) and HLD who presents for establishment of [facility], annual physicals . She recently had laparoscopic ablation done so feels sore at the surgical site with mild drainage. She states home BP readings range in the 120s and described her mood to be great today.     Problem List Addressed This Visit:    1. Encounter for medical examination to establish care    2. Annual physical exam    3. Need for diphtheria-tetanus-pertussis (Tdap) vaccine  -     (In Office Administered) Tdap Vaccine    4. Essential hypertension  Overview:  - uncontrolled today  -in the setting of recurrent hypokalemia, patient has been advised to wean off chlorthalidone  -will start on losartan since she could not tolerate spironolactone  - counseling regarding new medication including expected results, potential side effects, and appropriate use.  - questions elicited and answered  - encouraged to patient to notify me of any questions or concerns    Orders:  -     losartan (COZAAR) 25 MG tablet; Take 1 tablet (25 mg total) by mouth once daily.  Dispense: 90 tablet; Refill: 3    5. Abrasion of skin  -at the surgical site  -     mupirocin (BACTROBAN) 2 % ointment; Apply topically 2 (two) times daily.  Dispense: 30 g; Refill: 0    6. Hypokalemia due to excessive renal loss of potassium  -c/w K supplement  -     Comprehensive Metabolic Panel; Future; Expected date: 12/08/2023    7. Mixed hyperlipidemia  -uncontrolled due to dietary indiscretion  -been eating 2 eggs daily, motivated to quit  -will repeat values in 3 months   -     LIPID PANEL; Future; Expected date: 12/08/2023    8. Transaminitis  -likely NAFLD  -weight loss encouraged   -     Comprehensive Metabolic Panel; Future; Expected date:  2023         Health Maintenance   Topic Date Due    Mammogram  2024    TETANUS VACCINE  2033    Hepatitis C Screening  Completed    Lipid Panel  Completed       Past Medical History:   Diagnosis Date    Anxiety     Closed fracture of head of metatarsal, left, with malunion, subsequent encounter 2020    GERD (gastroesophageal reflux disease)     Pain from implanted hardware 2022       Past Surgical History:   Procedure Laterality Date     SECTION      2    CORRECTION OF HAMMER TOE Left 2020    Procedure: CORRECTION, HAMMER TOE;  Surgeon: Ha Goetz DPM;  Location: [facility] OR;  Service: Podiatry;  Laterality: Left;  mini c-arm, Arthrex toe implant, toes 3,4Mallory confirmed 2020 0745 KB    DILATION AND CURETTAGE OF UTERUS      FOOT HARDWARE REMOVAL Left 2022    Procedure: REMOVAL, HARDWARE, FOOT;  Surgeon: Ha Goetz DPM;  Location: [facility] OR;  Service: Podiatry;  Laterality: Left;    HYSTEROSCOPY WITH DILATION AND CURETTAGE OF UTERUS N/A 2023    Procedure: HYSTEROSCOPY, WITH DILATION AND CURETTAGE OF UTERUS;  Surgeon: Sherine Springer MD;  Location: [facility] OR;  Service: OB/GYN;  Laterality: N/A;    LAPAROSCOPIC SALPINGECTOMY Bilateral 2023    Procedure: SALPINGECTOMY, LAPAROSCOPIC;  Surgeon: Shreine Springer MD;  Location: [facility] OR;  Service: OB/GYN;  Laterality: Bilateral;    OSTEOTOMY OF METATARSAL BONE Left 2020    Procedure: OSTEOTOMY, METATARSAL BONE;  Surgeon: Ha Goetz DPM;  Location: [facility] OR;  Service: Podiatry;  Laterality: Left;  Arthrex screws, dorsal exostectomy foot/deep peroneal nerve release included (Cuca notified 10/26, )    THERMAL ABLATION OF ENDOMETRIUM N/A 2023    Procedure: ABLATION, ENDOMETRIUM, THERMAL (NOVASURE);  Surgeon: Sherine Springer MD;  Location: [facility] OR;  Service: OB/GYN;  Laterality: N/A;       family history includes Breast cancer in her maternal  grandmother; No Known Problems in her father and mother.    Social History     Tobacco Use    Smoking status: Never    Smokeless tobacco: Never   Substance Use Topics    Alcohol use: Yes     Alcohol/week: 0.8 standard drinks of alcohol     Types: 1 Standard drinks or equivalent per week     Comment: 3     Drug use: No       Review of Systems   Constitutional:  Negative for fatigue and fever.   Respiratory:  Negative for cough and chest tightness.    Gastrointestinal:  Negative for abdominal pain, diarrhea and vomiting.   Endocrine: Negative for polydipsia and polyphagia.   Genitourinary:  Negative for difficulty urinating, dysuria and frequency.   Musculoskeletal:  Negative for arthralgias, back pain, gait problem and joint swelling.   Skin:  Positive for wound. Negative for rash.   Neurological:  Negative for seizures, weakness, numbness and headaches.   Psychiatric/Behavioral:  Negative for sleep disturbance.        Outpatient Encounter Medications as of 12/8/2023   Medication Sig Note Dispense Refill    butalbital-acetaminophen-caffeine -40 mg (FIORICET, ESGIC) -40 mg per tablet Take 1 tablet by mouth every 4 (four) hours as needed for Pain. 4/2/2019: Prn       cetirizine (ZYRTEC) 10 MG tablet Take 10 mg by mouth once daily.       chlorthalidone (HYGROTEN) 50 MG Tab Take 1 tablet (50 mg total) by mouth once daily.  90 tablet 3    esomeprazole (NEXIUM) 20 MG capsule Take 20 mg by mouth.       FLUoxetine 40 MG capsule TAKE 1 CAPSULE BY MOUTH ONCE DAILY  90 capsule 2    Lactobacillus acidophilus 10 billion cell Cap Take 1 tablet by mouth once daily.       ondansetron (ZOFRAN) 4 MG tablet Take 1 tablet (4 mg total) by mouth every 6 (six) hours as needed for Nausea.  20 tablet 0    potassium chloride SA (K-DUR,KLOR-CON) 20 MEQ tablet Take 1 tablet (20 mEq total) by mouth once daily. for 90 doses  90 tablet 0    drospirenone-ethinyl estradioL (OCELLA) 3-0.03 mg per tablet Take 1 tablet by mouth once daily.  "(Patient not taking: Reported on 2023)  84 tablet 4    [] ibuprofen (ADVIL,MOTRIN) 800 MG tablet Take 1 tablet (800 mg total) by mouth 3 (three) times daily. for 7 days  21 tablet 0    losartan (COZAAR) 25 MG tablet Take 1 tablet (25 mg total) by mouth once daily.  90 tablet 3    mupirocin (BACTROBAN) 2 % ointment Apply topically 2 (two) times daily.  30 g 0     Facility-Administered Encounter Medications as of 2023   Medication Dose Route Frequency Provider Last Rate Last Admin    [DISCONTINUED] sodium chloride 0.9% flush 10 mL  10 mL Intravenous PRN Ha Goetz DPM        [DISCONTINUED] sodium chloride 0.9% flush 10 mL  10 mL Intravenous PRN Ha Goetz DPM            Review of patient's allergies indicates:   Allergen Reactions    Phenergan [promethazine] Hives    Spironolactone Rash       Physical Exam      Vital Signs  Pulse: 88  SpO2: 98 %  BP: (!) 158/72  Pain Score: 0-No pain  Height and Weight  Height: 5' 9" (175.3 cm)  Weight: 94.2 kg (207 lb 10.8 oz)  BSA (Calculated - sq m): 2.14 sq meters  BMI (Calculated): 30.7  Weight in (lb) to have BMI = 25: 168.9]    Physical Exam  Constitutional:       Appearance: Normal appearance.   HENT:      Head: Normocephalic and atraumatic.      Right Ear: Tympanic membrane normal.      Left Ear: Tympanic membrane normal.      Mouth/Throat:      Mouth: Mucous membranes are moist.      Pharynx: Oropharynx is clear.   Eyes:      Extraocular Movements: Extraocular movements intact.      Conjunctiva/sclera: Conjunctivae normal.      Pupils: Pupils are equal, round, and reactive to light.   Cardiovascular:      Rate and Rhythm: Normal rate and regular rhythm.      Pulses: Normal pulses.      Heart sounds: Normal heart sounds.   Pulmonary:      Breath sounds: Normal breath sounds.   Abdominal:      General: Abdomen is flat. Bowel sounds are normal.      Palpations: Abdomen is soft.   Musculoskeletal:      Cervical back: Normal range of motion. " "     Right lower leg: No edema.      Left lower leg: No edema.   Skin:     General: Skin is warm and dry.      Findings: Lesion (at laparoscopic site, oozing serous fluid) present.   Neurological:      General: No focal deficit present.      Mental Status: She is alert and oriented to person, place, and time.      Sensory: No sensory deficit.   Psychiatric:         Mood and Affect: Mood normal.         Behavior: Behavior normal.          Laboratory:  CBC:  Recent Labs   Lab Result Units 11/20/23  1201   WBC K/uL 10.16   RBC M/uL 4.89   Hemoglobin g/dL 14.0   Hematocrit % 41.7   Platelets K/uL 333   MCV fL 85   MCH pg 28.6   MCHC g/dL 33.6     CMP:  Recent Labs   Lab Result Units 12/05/23  0738   Glucose mg/dL 124*   Calcium mg/dL 9.4   Albumin g/dL 4.1   Total Protein g/dL 7.5   Sodium mmol/L 137   Potassium mmol/L 3.1*   CO2 mmol/L 26   Chloride mmol/L 98   BUN mg/dL 17   Alkaline Phosphatase U/L 112   ALT U/L 47*   AST U/L 44   Total Bilirubin mg/dL 0.6     URINALYSIS:  No results for input(s): "COLORU", "CLARITYU", "SPECGRAV", "PHUR", "PROTEINUA", "GLUCOSEU", "BILIRUBINCON", "BLOODU", "WBCU", "RBCU", "BACTERIA", "MUCUS", "NITRITE", "LEUKOCYTESUR", "UROBILINOGEN", "HYALINECASTS" in the last 2160 hours.   LIPIDS:  Recent Labs   Lab Result Units 12/05/23  0738   TSH uIU/mL 1.020   HDL mg/dL 64   Cholesterol mg/dL 255*   Triglycerides mg/dL 153*   LDL Cholesterol mg/dL 160.4*   HDL/Cholesterol Ratio % 25.1   Non-HDL Cholesterol mg/dL 191   Total Cholesterol/HDL Ratio  4.0     TSH:  Recent Labs   Lab Result Units 12/05/23  0738   TSH uIU/mL 1.020     A1C:  Recent Labs   Lab Result Units 12/05/23  0738   Hemoglobin A1C % 5.5       Radiology:      Assessment/Plan     Alyssa Robledo is a 43 y.o.female with:      1. Encounter for medical examination to establish care    2. Annual physical exam    3. Need for diphtheria-tetanus-pertussis (Tdap) vaccine  -     (In Office Administered) Tdap Vaccine    4. Essential " hypertension  Overview:  - uncontrolled today  -in the setting of recurrent hypokalemia, patient has been advised to wean off chlorthalidone  -will start on losartan since she could not tolerate spironolactone  -monitor and keep BP log BID  - counseling regarding new medication including expected results, potential side effects, and appropriate use.  - questions elicited and answered  - encouraged to patient to notify me of any questions or concerns    Orders:  -     losartan (COZAAR) 25 MG tablet; Take 1 tablet (25 mg total) by mouth once daily.  Dispense: 90 tablet; Refill: 3    5. Abrasion of skin  -at the surgical site  -     mupirocin (BACTROBAN) 2 % ointment; Apply topically 2 (two) times daily.  Dispense: 30 g; Refill: 0    6. Hypokalemia due to excessive renal loss of potassium  -c/w K supplement  -     Comprehensive Metabolic Panel; Future; Expected date: 12/08/2023    7. Mixed hyperlipidemia  -uncontrolled due to dietary indiscretion  -been eating 2 eggs daily, motivated to quit  -will repeat values in 3 months   -     LIPID PANEL; Future; Expected date: 12/08/2023    8. Transaminitis  -likely NAFLD  -weight loss encouraged   -     Comprehensive Metabolic Panel; Future; Expected date: 12/08/2023      -Continue current medications and maintain follow up with specialists.      Patient verbalizes understanding and agrees with current treatment plan.      MD Amad[facility]

## 2023-12-11 ENCOUNTER — PATIENT MESSAGE (OUTPATIENT)
Dept: FAMILY MEDICINE | Facility: CLINIC | Age: 43
End: 2023-12-11
Payer: COMMERCIAL

## 2023-12-12 ENCOUNTER — OFFICE VISIT (OUTPATIENT)
Dept: OBSTETRICS AND GYNECOLOGY | Facility: CLINIC | Age: 43
End: 2023-12-12
Payer: COMMERCIAL

## 2023-12-12 VITALS
SYSTOLIC BLOOD PRESSURE: 124 MMHG | HEIGHT: 69 IN | WEIGHT: 211.63 LBS | BODY MASS INDEX: 31.34 KG/M2 | HEART RATE: 83 BPM | DIASTOLIC BLOOD PRESSURE: 78 MMHG

## 2023-12-12 DIAGNOSIS — Z98.890 STATUS POST ENDOMETRIAL ABLATION: ICD-10-CM

## 2023-12-12 DIAGNOSIS — Z90.79 HX OF BILATERAL SALPINGECTOMY: Primary | ICD-10-CM

## 2023-12-12 PROCEDURE — 3074F PR MOST RECENT SYSTOLIC BLOOD PRESSURE < 130 MM HG: ICD-10-PCS | Mod: CPTII,S$GLB,, | Performed by: STUDENT IN AN ORGANIZED HEALTH CARE EDUCATION/TRAINING PROGRAM

## 2023-12-12 PROCEDURE — 3044F HG A1C LEVEL LT 7.0%: CPT | Mod: CPTII,S$GLB,, | Performed by: STUDENT IN AN ORGANIZED HEALTH CARE EDUCATION/TRAINING PROGRAM

## 2023-12-12 PROCEDURE — 3078F DIAST BP <80 MM HG: CPT | Mod: CPTII,S$GLB,, | Performed by: STUDENT IN AN ORGANIZED HEALTH CARE EDUCATION/TRAINING PROGRAM

## 2023-12-12 PROCEDURE — 1159F MED LIST DOCD IN RCRD: CPT | Mod: CPTII,S$GLB,, | Performed by: STUDENT IN AN ORGANIZED HEALTH CARE EDUCATION/TRAINING PROGRAM

## 2023-12-12 PROCEDURE — 1159F PR MEDICATION LIST DOCUMENTED IN MEDICAL RECORD: ICD-10-PCS | Mod: CPTII,S$GLB,, | Performed by: STUDENT IN AN ORGANIZED HEALTH CARE EDUCATION/TRAINING PROGRAM

## 2023-12-12 PROCEDURE — 3078F PR MOST RECENT DIASTOLIC BLOOD PRESSURE < 80 MM HG: ICD-10-PCS | Mod: CPTII,S$GLB,, | Performed by: STUDENT IN AN ORGANIZED HEALTH CARE EDUCATION/TRAINING PROGRAM

## 2023-12-12 PROCEDURE — 99999 PR PBB SHADOW E&M-EST. PATIENT-LVL III: ICD-10-PCS | Mod: PBBFAC,,, | Performed by: STUDENT IN AN ORGANIZED HEALTH CARE EDUCATION/TRAINING PROGRAM

## 2023-12-12 PROCEDURE — 4010F ACE/ARB THERAPY RXD/TAKEN: CPT | Mod: CPTII,S$GLB,, | Performed by: STUDENT IN AN ORGANIZED HEALTH CARE EDUCATION/TRAINING PROGRAM

## 2023-12-12 PROCEDURE — 3044F PR MOST RECENT HEMOGLOBIN A1C LEVEL <7.0%: ICD-10-PCS | Mod: CPTII,S$GLB,, | Performed by: STUDENT IN AN ORGANIZED HEALTH CARE EDUCATION/TRAINING PROGRAM

## 2023-12-12 PROCEDURE — 99024 POSTOP FOLLOW-UP VISIT: CPT | Mod: S$GLB,,, | Performed by: STUDENT IN AN ORGANIZED HEALTH CARE EDUCATION/TRAINING PROGRAM

## 2023-12-12 PROCEDURE — 3074F SYST BP LT 130 MM HG: CPT | Mod: CPTII,S$GLB,, | Performed by: STUDENT IN AN ORGANIZED HEALTH CARE EDUCATION/TRAINING PROGRAM

## 2023-12-12 PROCEDURE — 99024 PR POST-OP FOLLOW-UP VISIT: ICD-10-PCS | Mod: S$GLB,,, | Performed by: STUDENT IN AN ORGANIZED HEALTH CARE EDUCATION/TRAINING PROGRAM

## 2023-12-12 PROCEDURE — 4010F PR ACE/ARB THEARPY RXD/TAKEN: ICD-10-PCS | Mod: CPTII,S$GLB,, | Performed by: STUDENT IN AN ORGANIZED HEALTH CARE EDUCATION/TRAINING PROGRAM

## 2023-12-12 PROCEDURE — 99999 PR PBB SHADOW E&M-EST. PATIENT-LVL III: CPT | Mod: PBBFAC,,, | Performed by: STUDENT IN AN ORGANIZED HEALTH CARE EDUCATION/TRAINING PROGRAM

## 2023-12-12 NOTE — PROGRESS NOTES
Obstetrics and Gynecology  Post-operative Progress Note    Chief Complaint   Patient presents with    Post-op Evaluation       Alyssa Robledo is a 43 y.o. female  post-op from a hysteroscopy, dilation and curettage, endometrial ablation and laparoscopic salpingectomy  on 23.  Patient is Doing well postoperatively.      The pathology revealed:  Final Pathologic Diagnosis 1. Right fallopian tube, ligation:  Fallopian tube, completely transected    2. Left fallopian tube, ligation:  Fallopian tube, completely transected     Final Pathologic Diagnosis Uterine scrapings:  Nonproliferative endometrium with metaplasia associated breakdown  No evidence of hyperplasia or malignancy       Past Medical History:   Diagnosis Date    Anxiety     Closed fracture of head of metatarsal, left, with malunion, subsequent encounter 2020    GERD (gastroesophageal reflux disease)     Pain from implanted hardware 2022     Past Surgical History:   Procedure Laterality Date     SECTION      2    CORRECTION OF HAMMER TOE Left 2020    Procedure: CORRECTION, HAMMER TOE;  Surgeon: Ha Goetz DPM;  Location: [facility] OR;  Service: Podiatry;  Laterality: Left;  mini c-arm, Arthrex toe implant, toes 3,4Mallory confirmed 2020 0745 KB    DILATION AND CURETTAGE OF UTERUS      FOOT HARDWARE REMOVAL Left 2022    Procedure: REMOVAL, HARDWARE, FOOT;  Surgeon: Ha Goetz DPM;  Location: [facility] OR;  Service: Podiatry;  Laterality: Left;    HYSTEROSCOPY WITH DILATION AND CURETTAGE OF UTERUS N/A 2023    Procedure: HYSTEROSCOPY, WITH DILATION AND CURETTAGE OF UTERUS;  Surgeon: Sherine Springer MD;  Location: [facility];  Service: OB/GYN;  Laterality: N/A;    LAPAROSCOPIC SALPINGECTOMY Bilateral 2023    Procedure: SALPINGECTOMY, LAPAROSCOPIC;  Surgeon: Sherine Springer MD;  Location: [facility] OR;  Service: OB/GYN;  Laterality: Bilateral;    OSTEOTOMY OF METATARSAL BONE Left  "2020    Procedure: OSTEOTOMY, METATARSAL BONE;  Surgeon: Ha Goetz DPM;  Location: [facility] OR;  Service: Podiatry;  Laterality: Left;  Arthrex screws, dorsal exostectomy foot/deep peroneal nerve release included (Cuca notified 10/26, EF)    THERMAL ABLATION OF ENDOMETRIUM N/A 2023    Procedure: ABLATION, ENDOMETRIUM, THERMAL (NOVASURE);  Surgeon: Sherine Springer MD;  Location: [facility] OR;  Service: OB/GYN;  Laterality: N/A;     Family History   Problem Relation Age of Onset    No Known Problems Mother     No Known Problems Father     Breast cancer Maternal Grandmother     Heart disease Neg Hx     Cancer Neg Hx      Social History     Tobacco Use    Smoking status: Never    Smokeless tobacco: Never   Substance Use Topics    Alcohol use: Yes     Alcohol/week: 0.8 standard drinks of alcohol     Types: 1 Standard drinks or equivalent per week     Comment: 3     Drug use: No     OB History    Para Term  AB Living   3 2 1 1 1     SAB IAB Ectopic Multiple Live Births   1              # Outcome Date GA Lbr Cnoor/2nd Weight Sex Delivery Anes PTL Lv   3 Term      CS-LTranv      2 SAB            1       CS-LTranv          /78   Pulse 83   Ht 5' 9" (1.753 m)   Wt 96 kg (211 lb 10.3 oz)   LMP 2023   BMI 31.25 kg/m²     ROS:  GENERAL: No fever, chills, fatigability or weight loss.  VULVAR: No pain, no lesions and no itching.  VAGINAL: No relaxation, no itching, no discharge, no abnormal bleeding and no lesions.  ABDOMEN: No abdominal pain. Denies nausea. Denies vomiting. No diarrhea. No constipation  BREAST: Denies pain. No lumps. No discharge.  URINARY: No incontinence, no nocturia, no frequency and no dysuria.  CARDIOVASCULAR: No chest pain. No shortness of breath. No leg cramps.  NEUROLOGICAL: No headaches. No vision changes.    PE:   /78   Pulse 83   Ht 5' 9" (1.753 m)   Wt 96 kg (211 lb 10.3 oz)   LMP 2023   BMI 31.25 kg/m²   General " appearance: alert, appears stated age, and cooperative  Abdomen: soft, non-tender; bowel sounds normal; no masses,  no organomegaly and incisions c/d/i  Neurologic: Grossly normal      ASSESSMENT:    1. Hx of bilateral salpingectomy        2. Status post endometrial ablation             PLAN:  - patient can return to normal activity

## 2024-01-03 ENCOUNTER — PATIENT MESSAGE (OUTPATIENT)
Dept: FAMILY MEDICINE | Facility: CLINIC | Age: 44
End: 2024-01-03
Payer: COMMERCIAL

## 2024-01-03 DIAGNOSIS — I10 ESSENTIAL HYPERTENSION: Primary | ICD-10-CM

## 2024-01-03 RX ORDER — LOSARTAN POTASSIUM 50 MG/1
50 TABLET ORAL DAILY
Qty: 90 TABLET | Refills: 3 | Status: SHIPPED | OUTPATIENT
Start: 2024-01-03 | End: 2025-01-02

## 2024-01-03 RX ORDER — CARVEDILOL 3.12 MG/1
3.12 TABLET ORAL 2 TIMES DAILY WITH MEALS
Qty: 60 TABLET | Refills: 11 | Status: SHIPPED | OUTPATIENT
Start: 2024-01-03 | End: 2025-01-02

## 2024-01-07 ENCOUNTER — OFFICE VISIT (OUTPATIENT)
Dept: URGENT CARE | Facility: CLINIC | Age: 44
End: 2024-01-07
Payer: COMMERCIAL

## 2024-01-07 VITALS
TEMPERATURE: 98 F | HEART RATE: 98 BPM | WEIGHT: 211 LBS | SYSTOLIC BLOOD PRESSURE: 141 MMHG | OXYGEN SATURATION: 98 % | DIASTOLIC BLOOD PRESSURE: 76 MMHG | RESPIRATION RATE: 20 BRPM | BODY MASS INDEX: 31.25 KG/M2 | HEIGHT: 69 IN

## 2024-01-07 DIAGNOSIS — H65.02 ACUTE SEROUS OTITIS MEDIA OF LEFT EAR, RECURRENCE NOT SPECIFIED: ICD-10-CM

## 2024-01-07 DIAGNOSIS — K21.9 GASTROESOPHAGEAL REFLUX DISEASE, UNSPECIFIED WHETHER ESOPHAGITIS PRESENT: ICD-10-CM

## 2024-01-07 DIAGNOSIS — I10 ESSENTIAL HYPERTENSION: ICD-10-CM

## 2024-01-07 DIAGNOSIS — J01.90 ACUTE BACTERIAL SINUSITIS: Primary | ICD-10-CM

## 2024-01-07 DIAGNOSIS — B96.89 ACUTE BACTERIAL SINUSITIS: Primary | ICD-10-CM

## 2024-01-07 PROCEDURE — 99214 OFFICE O/P EST MOD 30 MIN: CPT | Mod: S$GLB,,, | Performed by: PHYSICIAN ASSISTANT

## 2024-01-07 RX ORDER — AMOXICILLIN AND CLAVULANATE POTASSIUM 875; 125 MG/1; MG/1
1 TABLET, FILM COATED ORAL EVERY 12 HOURS
Qty: 14 TABLET | Refills: 0 | Status: SHIPPED | OUTPATIENT
Start: 2024-01-07 | End: 2024-01-14

## 2024-01-07 RX ORDER — FLUCONAZOLE 150 MG/1
150 TABLET ORAL DAILY
Qty: 1 TABLET | Refills: 0 | Status: SHIPPED | OUTPATIENT
Start: 2024-01-07 | End: 2024-01-08

## 2024-01-07 RX ORDER — PREDNISONE 20 MG/1
20 TABLET ORAL DAILY
Qty: 4 TABLET | Refills: 0 | Status: SHIPPED | OUTPATIENT
Start: 2024-01-07 | End: 2024-01-11

## 2024-01-07 NOTE — PROGRESS NOTES
"Subjective:      Patient ID: Alyssa Robledo is a 43 y.o. female.    Vitals:  height is 5' 9" (1.753 m) and weight is 95.7 kg (211 lb). Her oral temperature is 97.6 °F (36.4 °C). Her blood pressure is 141/76 (abnormal) and her pulse is 98. Her respiration is 20 and oxygen saturation is 98%.     Chief Complaint: Sinus Problem    Pt complain of sinus pressure and left ear pain that started 2 weeks ago. Pt took mucin ex which gave no relief.    Patient provider note starts here:  Patient presents with complaints of having sinus pressure and left ear pain for going on 2 weeks times. Reports taking Mucinex without significant relief of her symptoms. Denies fevers.     Sinus Problem  This is a new problem. The current episode started 1 to 4 weeks ago. The problem has been gradually worsening since onset. There has been no fever. Her pain is at a severity of 3/10. The pain is mild. Associated symptoms include congestion, ear pain (left), headaches and sinus pressure. Pertinent negatives include no chills, coughing, diaphoresis, hoarse voice, neck pain, shortness of breath, sneezing, sore throat or swollen glands. Treatments tried: mucin ex. The treatment provided no relief.       Constitution: Negative for chills, sweating and fever.   HENT:  Positive for ear pain (left), congestion, postnasal drip, sinus pain and sinus pressure. Negative for ear discharge and sore throat.    Neck: Negative for neck pain.   Cardiovascular:  Negative for chest pain, palpitations and sob on exertion.   Respiratory:  Negative for chest tightness, cough, shortness of breath and wheezing.    Gastrointestinal:  Negative for abdominal pain, vomiting and diarrhea.   Skin:  Negative for color change and wound.   Allergic/Immunologic: Negative for sneezing.   Neurological:  Positive for headaches. Negative for numbness and tingling.      Objective:     Physical Exam   Constitutional: She is oriented to person, place, and time. She appears " well-developed. She is cooperative.  Non-toxic appearance. She does not appear ill. No distress.   HENT:   Head: Normocephalic and atraumatic.   Ears:   Right Ear: Hearing, tympanic membrane, external ear and ear canal normal.   Left Ear: Hearing, external ear and ear canal normal.      Comments: Left TM is bulging with serous middle ear effusion. There is no TTP over the mastoid or tragus.  Right TM is normal.     Nose: Congestion present. No mucosal edema, rhinorrhea or nasal deformity. No epistaxis. Right sinus exhibits no maxillary sinus tenderness and no frontal sinus tenderness. Left sinus exhibits no maxillary sinus tenderness and no frontal sinus tenderness.      Comments: Mild TTP over the bilateral maxillary sinuses  Mouth/Throat: Uvula is midline, oropharynx is clear and moist and mucous membranes are normal. No trismus in the jaw. Normal dentition. No uvula swelling. No oropharyngeal exudate, posterior oropharyngeal edema or posterior oropharyngeal erythema.   Eyes: Conjunctivae and lids are normal. No scleral icterus.   Neck: Trachea normal and phonation normal. Neck supple. No edema present. No erythema present. No neck rigidity present.   Cardiovascular: Normal rate, regular rhythm, normal heart sounds and normal pulses.   Pulmonary/Chest: Effort normal and breath sounds normal. No respiratory distress. She has no decreased breath sounds. She has no rhonchi.   Abdominal: Normal appearance.   Musculoskeletal: Normal range of motion.         General: No deformity. Normal range of motion.   Neurological: She is alert and oriented to person, place, and time. She exhibits normal muscle tone. Coordination normal.   Skin: Skin is warm, dry, intact, not diaphoretic and not pale.   Psychiatric: Her speech is normal and behavior is normal. Judgment and thought content normal.   Nursing note and vitals reviewed.      Assessment:     1. Acute bacterial sinusitis    2. Essential hypertension    3. Gastroesophageal  reflux disease, unspecified whether esophagitis present    4. Acute serous otitis media of left ear, recurrence not specified        Plan:       Acute bacterial sinusitis  -     amoxicillin-clavulanate 875-125mg (AUGMENTIN) 875-125 mg per tablet; Take 1 tablet by mouth every 12 (twelve) hours. for 7 days  Dispense: 14 tablet; Refill: 0    Essential hypertension    Gastroesophageal reflux disease, unspecified whether esophagitis present    Acute serous otitis media of left ear, recurrence not specified  -     predniSONE (DELTASONE) 20 MG tablet; Take 1 tablet (20 mg total) by mouth once daily. for 4 days  Dispense: 4 tablet; Refill: 0    Other orders  -     fluconazole (DIFLUCAN) 150 MG Tab; Take 1 tablet (150 mg total) by mouth once daily. for 1 day  Dispense: 1 tablet; Refill: 0          Medical Decision Making:   History:   Old Medical Records: I decided to obtain old medical records.  Urgent Care Management:  A. Problem List:   -Acute: Acute bacterials sinusitis, Serous otitis media left ear    -Chronic: chronic rhinitis, HTN, GERD, anxiety  B. Differential diagnosis: viral vs bacterial URI, pharyngitis, otitis, COVID 19, influenza, pneumonia  C. Diagnostic Testing Ordered: None  D. Diagnostic Testing Considered: None  E. Independent Historians: None  F. Urgent Care Midlevel Independent Results Interpretation:   G. Radiology:  H. Review of Previous Medical Records:  I. Home Medications Reviewed  J. Social Determinants of Health considered  K. Medical Decision Making and Disposition: Patient presents with complaints of having sinus pain and pressure and left sided ear pain for almost 2 weeks time. On exam, she is afebrile and nontoxic appearing. Lungs CTAB, vitals stable. The risks and benefits of PO steroids was discussed with the patient. Advised close follow-up with PCP and ED precautions discussed. She verbalized understanding and agreed with plan.          Patient Instructions   You have been prescribed a  steroid today. Take the prescription as directed. Steroids can increase blood sugar. You can also have the following when taking steroids: flushing, jitteriness, weight gain, fluid retention, bone weakening. If you develop any adverse symptoms, stop taking the medication immediately.    You must understand that you've received an Urgent Care treatment only and that you may be released before all your medical problems are known or treated. You, the patient, will arrange for follow up care as instructed.      Follow up with your PCP or specialty clinic as instructed in the next 2-3 days if not improved or as needed. You can call [phone] to schedule an appointment with appropriate provider.      If you condition worsens, we recommend that you receive another evaluation at the emergency room immediately or contact your primary medical clinic's after hours call service to discuss your concerns.      Please return here or go to the Emergency Department for any concerns or worsening condition.      If you were prescribed a narcotic or controlled substance, do not drive or operate heavy equipment or machinery while taking these medications.

## 2024-02-27 DIAGNOSIS — F41.1 GENERALIZED ANXIETY DISORDER: ICD-10-CM

## 2024-02-27 RX ORDER — FLUOXETINE HYDROCHLORIDE 40 MG/1
40 CAPSULE ORAL DAILY
Qty: 90 CAPSULE | Refills: 3 | Status: SHIPPED | OUTPATIENT
Start: 2024-02-27 | End: 2024-04-04

## 2024-03-06 DIAGNOSIS — I10 ESSENTIAL HYPERTENSION: ICD-10-CM

## 2024-03-06 DIAGNOSIS — E87.6 HYPOKALEMIA: ICD-10-CM

## 2024-03-06 RX ORDER — POTASSIUM CHLORIDE 20 MEQ/1
TABLET, EXTENDED RELEASE ORAL
Qty: 90 TABLET | Refills: 0 | Status: SHIPPED | OUTPATIENT
Start: 2024-03-06 | End: 2024-04-04

## 2024-03-06 NOTE — TELEPHONE ENCOUNTER
Refill Routing Note   Medication(s) are not appropriate for processing by [facility] for the following reason(s):        Non-participating provider    [facility] action(s):  Route               Appointments  past 12m or future 3m with PCP    Date Provider   Last Visit   12/8/2023 Wanda Wayne MD   Next Visit   4/10/2024 Wanda Wayne MD   ED visits in past 90 days: 0        Note composed:9:42 AM 03/06/2024

## 2024-03-22 ENCOUNTER — PATIENT MESSAGE (OUTPATIENT)
Dept: PODIATRY | Facility: CLINIC | Age: 44
End: 2024-03-22
Payer: COMMERCIAL

## 2024-03-22 ENCOUNTER — OFFICE VISIT (OUTPATIENT)
Dept: PODIATRY | Facility: CLINIC | Age: 44
End: 2024-03-22
Payer: COMMERCIAL

## 2024-03-22 ENCOUNTER — HOSPITAL ENCOUNTER (OUTPATIENT)
Dept: RADIOLOGY | Facility: HOSPITAL | Age: 44
Discharge: HOME | End: 2024-03-22
Attending: PODIATRIST
Payer: COMMERCIAL

## 2024-03-22 VITALS
WEIGHT: 211 LBS | BODY MASS INDEX: 31.25 KG/M2 | DIASTOLIC BLOOD PRESSURE: 83 MMHG | SYSTOLIC BLOOD PRESSURE: 147 MMHG | HEART RATE: 85 BPM | HEIGHT: 69 IN

## 2024-03-22 DIAGNOSIS — M20.42 HAMMERTOE OF LEFT FOOT: ICD-10-CM

## 2024-03-22 DIAGNOSIS — M76.60 INSERTIONAL ACHILLES TENDINOPATHY: ICD-10-CM

## 2024-03-22 DIAGNOSIS — M76.60 INSERTIONAL ACHILLES TENDINOPATHY: Primary | ICD-10-CM

## 2024-03-22 PROCEDURE — 3008F BODY MASS INDEX DOCD: CPT | Mod: CPTII,S$GLB,, | Performed by: PODIATRIST

## 2024-03-22 PROCEDURE — 99999 PR PBB SHADOW E&M-EST. PATIENT-LVL III: CPT | Mod: PBBFAC,,, | Performed by: PODIATRIST

## 2024-03-22 PROCEDURE — 1159F MED LIST DOCD IN RCRD: CPT | Mod: CPTII,S$GLB,, | Performed by: PODIATRIST

## 2024-03-22 PROCEDURE — 3079F DIAST BP 80-89 MM HG: CPT | Mod: CPTII,S$GLB,, | Performed by: PODIATRIST

## 2024-03-22 PROCEDURE — 1160F RVW MEDS BY RX/DR IN RCRD: CPT | Mod: CPTII,S$GLB,, | Performed by: PODIATRIST

## 2024-03-22 PROCEDURE — 73630 X-RAY EXAM OF FOOT: CPT | Mod: 26,50,, | Performed by: RADIOLOGY

## 2024-03-22 PROCEDURE — 73630 X-RAY EXAM OF FOOT: CPT | Mod: TC,50,FY

## 2024-03-22 PROCEDURE — 99213 OFFICE O/P EST LOW 20 MIN: CPT | Mod: S$GLB,,, | Performed by: PODIATRIST

## 2024-03-22 PROCEDURE — 3077F SYST BP >= 140 MM HG: CPT | Mod: CPTII,S$GLB,, | Performed by: PODIATRIST

## 2024-03-22 PROCEDURE — 4010F ACE/ARB THERAPY RXD/TAKEN: CPT | Mod: CPTII,S$GLB,, | Performed by: PODIATRIST

## 2024-03-22 NOTE — PROGRESS NOTES
Subjective:      Patient ID: Alyssa Robledo is a 43 y.o. female.    Chief Complaint: Foot Pain (Left foot pain), Heel Pain (Both heels sharp pain), and Hammer Toe (Left foot)    Presents today complaining of persistent pain to the left foot after breaking her left 5th metatarsal at [employer] football game a year ago.  Relates that she gets pins and needles tingling to the top of the foot that radiates along the 1st and 2nd toes that is worsened when she flexes the foot down.  Also gets some moderate amount of pain underneath the ball of the left foot when she stands or walks for extended periods of time.  Pain is alleviated by rest.  She showed me an old x-ray which showed a 5th metatarsal oblique neck fracture that was significantly displaced on AP view.    10/01/2020:  Follow-up for left foot pain.  She has been wearing a silicone pad underneath the ball of foot however this seems to increase her pain.  She has been resorting to a Darco shoe and instead of tennis shoe.    11/12/2020:  Post external neurolysis of the deep peroneal nerve and dorsal medial cutaneous nerves with extensor hallucis brevis tenotomy, percutaneous flexor tenotomies toes 3 and 4, rotational osteotomy the 4th metatarsal capital fragment and osteotomy of malunited 5th metatarsal fracture left foot on 11/06/2020.  Relates only discomfort when she applies weight on the foot.  She stopped taking pain medication 4 days ago.    12/07/2020:  1 month postop.  Relates that the exposed K-wire is catching on things and is bothering her.  Otherwise she has no pain.  She is heel weight-bearing with crutches and a orthopedic boot.    1/04/2021:  2 months postop.  Relates that she has mild pain however she is on her foot for extended periods of time it becomes moderate.  She still wearing the orthopedic boot.  Denies any slips, trips or falls.  Inquiring about return to work status.    2/1/2021: 11 weeks  post op above procedure. 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 Relates to discomfort on the top of her left foot, especially at night. States her bedsheet making contact with her left foot wakes her up in the middle of the night. Also relates to pain in the left MTPJs, new onset, states she was running after a student at work last week and pain started afterwards. Denies tripping, falling.    03/08/2021:  Follow-up for pain along the top of the left foot.  States that she is unable to take gabapentin during the day however has been taking at night.  States that makes her too tired.  Relates that she has some numbness along the top of the left foot that will radiate in between the 1st and 2nd toes as well proximally up the leg.  She does not have any pain along the bottom of the left foot while wearing shoes however when she walks barefoot she does have discomfort.    01/20/2022:  Complains of intermittent pain to left foot specially of weather changes.  Walking barefoot aggravates pain underneath the ball the foot.  She feels as though she can feel the retained hardware in the foot.  She works as a school nurse.  Pain mostly described as aching sensation.    03/17/2022: Returns complaining of painful retained hardware left 5th metatarsal and painful left plantar foot callus. She is requesting removal of left foot hardware this Summer. She is wearing new athletic shoes. She has attempted a metatarsal pad for the left foot before but could not tolerate it.     06/16/2022:  Post hardware removal left foot on 06/08/2022.  Relates no left foot pain.  No new concerns.    06/23/2022:  Nearly 3 weeks postop.  No new concerns although she does ask about her toes 2-4 on the left.  She has a painful callus on bottom the left forefoot.    03/22/2024:  Returns complaining of pain at the back of both heels describing a burning tightness type of pain which has been occurring for the  "past 3 months however it is steadily improving over time.  States that the pain is worse when she initially stands and walks in the morning and will improve as she moves.  Denies any trauma or changes in activity.  She works as a nurse in the school system.  Wearing on Cloud athletic style shoes.  Also complains of irritation pain along the top of her left 2nd and 3rd toe in enclosed shoes.  She describes an aching sensation to the toes left foot.    Vitals:    24 1529   BP: (!) 147/83   Pulse: 85   Weight: 95.7 kg (210 lb 15.7 oz)   Height: 5' 9" (1.753 m)   PainSc:   5      Past Medical History:   Diagnosis Date    Anxiety     Closed fracture of head of metatarsal, left, with malunion, subsequent encounter 2020    GERD (gastroesophageal reflux disease)     Pain from implanted hardware 2022       Past Surgical History:   Procedure Laterality Date     SECTION      2    CORRECTION OF HAMMER TOE Left 2020    Procedure: CORRECTION, HAMMER TOE;  Surgeon: Ha Goetz DPM;  Location: [facility] OR;  Service: Podiatry;  Laterality: Left;  mini c-arm, Arthrex toe implant, toes 3,4Mallory confirmed 2020 0745 KB    DILATION AND CURETTAGE OF UTERUS      FOOT HARDWARE REMOVAL Left 2022    Procedure: REMOVAL, HARDWARE, FOOT;  Surgeon: Ha Goetz DPM;  Location: [facility] OR;  Service: Podiatry;  Laterality: Left;    HYSTEROSCOPY WITH DILATION AND CURETTAGE OF UTERUS N/A 2023    Procedure: HYSTEROSCOPY, WITH DILATION AND CURETTAGE OF UTERUS;  Surgeon: Sherine Springer MD;  Location: [facility] OR;  Service: OB/GYN;  Laterality: N/A;    LAPAROSCOPIC SALPINGECTOMY Bilateral 2023    Procedure: SALPINGECTOMY, LAPAROSCOPIC;  Surgeon: Sherine Springer MD;  Location: [facility] OR;  Service: OB/GYN;  Laterality: Bilateral;    OSTEOTOMY OF METATARSAL BONE Left 2020    Procedure: OSTEOTOMY, METATARSAL BONE;  Surgeon: Ha Goetz DPM;  Location: [facility] OR;  Service: " Podiatry;  Laterality: Left;  Arthrex screws, dorsal exostectomy foot/deep peroneal nerve release included (Cuca notified 10/26, EF)    THERMAL ABLATION OF ENDOMETRIUM N/A 11/22/2023    Procedure: ABLATION, ENDOMETRIUM, THERMAL (NOVASURE);  Surgeon: Sherine Springer MD;  Location: [facility] OR;  Service: OB/GYN;  Laterality: N/A;       Family History   Problem Relation Age of Onset    No Known Problems Mother     No Known Problems Father     Breast cancer Maternal Grandmother     Heart disease Neg Hx     Cancer Neg Hx        Social History     Socioeconomic History    Marital status:    Occupational History     Employer: [employer]   Tobacco Use    Smoking status: Never     Passive exposure: Never    Smokeless tobacco: Never   Substance and Sexual Activity    Alcohol use: Yes     Alcohol/week: 0.8 standard drinks of alcohol     Types: 1 Standard drinks or equivalent per week     Comment: 3     Drug use: No    Sexual activity: Not Currently     Partners: Male     Birth control/protection: None   Social History Narrative    Live in [place] with  and 2 daughters (2021 12 yo and 7yo). 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 Non-smoker. No exposure to blood; No risk for STD's. Socially drinks alcohol;  is employed by [employer] as a . 8/2021  and youngest daughter had covid-19 and did well.                      Current Outpatient Medications   Medication Sig Dispense Refill    butalbital-acetaminophen-caffeine -40 mg (FIORICET, ESGIC) -40 mg per tablet Take 1 tablet by mouth every 4 (four) hours as needed for Pain.      carvediloL (COREG) 3.125 MG tablet Take 1 tablet (3.125 mg total) by mouth 2 (two) times daily with meals. 60 tablet 11    esomeprazole (NEXIUM) 20 MG capsule Take 20 mg by mouth.      FLUoxetine 40 MG capsule Take 1 capsule (40 mg total) by mouth once daily. 90 capsule 3    Lactobacillus acidophilus 10 billion cell Cap Take 1 tablet by  mouth once daily.      losartan (COZAAR) 50 MG tablet Take 1 tablet (50 mg total) by mouth once daily. 90 tablet 3    mupirocin (BACTROBAN) 2 % ointment Apply topically 2 (two) times daily. 30 g 0    ondansetron (ZOFRAN) 4 MG tablet Take 1 tablet (4 mg total) by mouth every 6 (six) hours as needed for Nausea. 20 tablet 0    potassium chloride SA (K-DUR,KLOR-CON) 20 MEQ tablet TAKE 1 TABLET(20 MEQ) BY MOUTH EVERY DAY FOR 90 DOSES 90 tablet 0     No current facility-administered medications for this visit.       Review of patient's allergies indicates:   Allergen Reactions    Phenergan [promethazine] Hives    Spironolactone Rash         Review of Systems   Constitutional: Negative for chills, fever and malaise/fatigue.   HENT:  Negative for congestion and hearing loss.    Cardiovascular:  Negative for chest pain, claudication and leg swelling.   Respiratory:  Negative for cough and shortness of breath.    Skin:  Negative for color change and poor wound healing.        Callus left foot   Musculoskeletal:  Positive for muscle cramps. Negative for back pain, joint pain and muscle weakness.   Gastrointestinal:  Negative for nausea and vomiting.   Neurological:  Positive for paresthesias. Negative for numbness and weakness.   Psychiatric/Behavioral:  Negative for altered mental status.            Objective:      Physical Exam  Vitals reviewed.   Constitutional:       General: She is not in acute distress.     Appearance: Normal appearance. She is not ill-appearing.   Cardiovascular:      Pulses:           Dorsalis pedis pulses are 2+ on the right side and 2+ on the left side.        Posterior tibial pulses are 2+ on the right side and 2+ on the left side.      Comments: Toes and feet are warm to touch proximal to distal b/l. There is normal hair growth distribution on the feet and toes b/l. There is no edema b/l. No spider veins or varicosities present b/l.     Musculoskeletal:      Comments: Semi-rigid flexion contracture  of the left 2nd and 3rd toe PIPJ with extensor contracture of the MTP with flexible flexion contracture of the left 4th toe PIPJ.  There is some mild discomfort plantar 3rd metatarsal head left forefoot.  Flexible flexion contracture of toes 2-4 right foot at the PIPJ.    Mild cavus foot structure bilateral foot.    Mild pain on palpation posterior lateral heel bilateral over lying bony prominence.  No pain with squeezing the heel bilateral.  No pain with active range motion but note that ankle range motion is approximately 5-10 degrees of dorsiflexion with the knee extended however patient can feel a stiffness and discomfort the back of both heels during range of motion.   Skin:     General: Skin is warm.      Capillary Refill: Capillary refill takes less than 2 seconds.      Findings: No ecchymosis or erythema.      Nails: There is no clubbing.   Neurological:      Mental Status: She is alert and oriented to person, place, and time.      Sensory: Sensation is intact.      Motor: Motor function is intact.      Comments: +tinel's sign to the deep peroneal nerve left foot                   Assessment:       Encounter Diagnoses   Name Primary?    Insertional Achilles tendinopathy Yes    Hammertoe of left foot          Plan:       Alyssa was seen today for foot pain, heel pain and hammer toe.    Diagnoses and all orders for this visit:    Insertional Achilles tendinopathy  -     X-Ray Foot Complete 3 view Bilateral; Future    Hammertoe of left foot  -     X-Ray Foot Complete 3 view Bilateral; Future      I counseled the patient on her conditions, their implications and medical management.    Baseline bilateral foot weight-bearing x-ray ordered to compare the toes bilateral foot into assess the underlying bony prominence at the posterior aspect of the calcaneus bilateral.    We discussed that she has an insertional Achilles tendinopathy the back of both heels with secondary underlying exostosis which seems to be improving  on its own.  We discussed utilizing topical Voltaren gel up to 4 times daily as needed massaging the area.  We discussed avoiding shoes with the heel counter that is too aggressively curved anteriorly to prevent direct rubbing and irritation.  We discussed behavior modifications such as avoiding driving for extended periods of time since it will rest pressure against the back of the heel.  We discussed stretching her calf muscles every couple hours throughout the day to help alleviate the discomfort.    We also dispensed and applied a crest pad to left 3rd toe which seemed to help alleviate her discomfort and will prevent retrograde buckling and irritation along the dorsal PIPJ.  We did discuss aggressive surgical intervention if this fails such as PIPJ arthrodesis of the 2nd 3rd and possibly 4th toes left foot with possible extensor tendon lengthening.    RTC within 6-8 weeks or p.r.n. as discussed.    A portion of this note was generated by voice recognition software and may contain spelling and grammar errors.        .

## 2024-03-27 ENCOUNTER — PATIENT MESSAGE (OUTPATIENT)
Dept: FAMILY MEDICINE | Facility: CLINIC | Age: 44
End: 2024-03-27
Payer: COMMERCIAL

## 2024-04-03 ENCOUNTER — PATIENT OUTREACH (OUTPATIENT)
Dept: ADMINISTRATIVE | Facility: HOSPITAL | Age: 44
End: 2024-04-03
Payer: COMMERCIAL

## 2024-04-03 NOTE — PROGRESS NOTES
Population Health Chart Review & Patient Outreach Details      Additional [facility] Health Notes:               Updates Requested / Reviewed:      Updated Care Coordination Note, Care Everywhere, and Immunizations Reconciliation Completed or Queried: [place]         Health Maintenance Topics Overdue:      VB Score: 1     Uncontrolled BP                       Health Maintenance Topic(s) Outreach Outcomes & Actions Taken:    Blood Pressure - Outreach Outcomes & Actions Taken  : Primary Care Follow Up Visit Scheduled

## 2024-04-04 ENCOUNTER — OFFICE VISIT (OUTPATIENT)
Dept: FAMILY MEDICINE | Facility: CLINIC | Age: 44
End: 2024-04-04
Payer: COMMERCIAL

## 2024-04-04 VITALS
WEIGHT: 221.69 LBS | BODY MASS INDEX: 32.84 KG/M2 | OXYGEN SATURATION: 98 % | DIASTOLIC BLOOD PRESSURE: 100 MMHG | TEMPERATURE: 98 F | HEART RATE: 78 BPM | HEIGHT: 69 IN | SYSTOLIC BLOOD PRESSURE: 170 MMHG

## 2024-04-04 DIAGNOSIS — E78.2 MIXED HYPERLIPIDEMIA: ICD-10-CM

## 2024-04-04 DIAGNOSIS — E66.09 CLASS 1 OBESITY DUE TO EXCESS CALORIES WITH SERIOUS COMORBIDITY AND BODY MASS INDEX (BMI) OF 32.0 TO 32.9 IN ADULT: ICD-10-CM

## 2024-04-04 DIAGNOSIS — E87.6 HYPOKALEMIA: ICD-10-CM

## 2024-04-04 DIAGNOSIS — R74.01 TRANSAMINITIS: ICD-10-CM

## 2024-04-04 DIAGNOSIS — F41.1 GAD (GENERALIZED ANXIETY DISORDER): ICD-10-CM

## 2024-04-04 DIAGNOSIS — I10 ESSENTIAL HYPERTENSION: ICD-10-CM

## 2024-04-04 PROCEDURE — 1159F MED LIST DOCD IN RCRD: CPT | Mod: CPTII,S$GLB,, | Performed by: STUDENT IN AN ORGANIZED HEALTH CARE EDUCATION/TRAINING PROGRAM

## 2024-04-04 PROCEDURE — 99999 PR PBB SHADOW E&M-EST. PATIENT-LVL IV: CPT | Mod: PBBFAC,,, | Performed by: STUDENT IN AN ORGANIZED HEALTH CARE EDUCATION/TRAINING PROGRAM

## 2024-04-04 PROCEDURE — 3080F DIAST BP >= 90 MM HG: CPT | Mod: CPTII,S$GLB,, | Performed by: STUDENT IN AN ORGANIZED HEALTH CARE EDUCATION/TRAINING PROGRAM

## 2024-04-04 PROCEDURE — 4010F ACE/ARB THERAPY RXD/TAKEN: CPT | Mod: CPTII,S$GLB,, | Performed by: STUDENT IN AN ORGANIZED HEALTH CARE EDUCATION/TRAINING PROGRAM

## 2024-04-04 PROCEDURE — 99214 OFFICE O/P EST MOD 30 MIN: CPT | Mod: S$GLB,,, | Performed by: STUDENT IN AN ORGANIZED HEALTH CARE EDUCATION/TRAINING PROGRAM

## 2024-04-04 PROCEDURE — 1160F RVW MEDS BY RX/DR IN RCRD: CPT | Mod: CPTII,S$GLB,, | Performed by: STUDENT IN AN ORGANIZED HEALTH CARE EDUCATION/TRAINING PROGRAM

## 2024-04-04 PROCEDURE — 3008F BODY MASS INDEX DOCD: CPT | Mod: CPTII,S$GLB,, | Performed by: STUDENT IN AN ORGANIZED HEALTH CARE EDUCATION/TRAINING PROGRAM

## 2024-04-04 PROCEDURE — 3077F SYST BP >= 140 MM HG: CPT | Mod: CPTII,S$GLB,, | Performed by: STUDENT IN AN ORGANIZED HEALTH CARE EDUCATION/TRAINING PROGRAM

## 2024-04-04 RX ORDER — FLUOXETINE HYDROCHLORIDE 60 MG/1
60 TABLET, FILM COATED ORAL; ORAL DAILY
Qty: 90 TABLET | Refills: 3 | Status: SHIPPED | OUTPATIENT
Start: 2024-04-04 | End: 2024-04-15

## 2024-04-04 RX ORDER — CARVEDILOL 12.5 MG/1
12.5 TABLET ORAL 2 TIMES DAILY WITH MEALS
Qty: 60 TABLET | Refills: 11 | Status: SHIPPED | OUTPATIENT
Start: 2024-04-04 | End: 2025-04-04

## 2024-04-04 RX ORDER — TIRZEPATIDE 2.5 MG/.5ML
2.5 INJECTION, SOLUTION SUBCUTANEOUS
Qty: 4 PEN | Refills: 3 | Status: SHIPPED | OUTPATIENT
Start: 2024-04-04 | End: 2024-05-01 | Stop reason: SDUPTHER

## 2024-04-04 NOTE — PROGRESS NOTES
[facility] Note    Chief Complaint      Chief Complaint   Patient presents with    Blood Pressure Check     History of Present Illness      HPI    Alyssa Robledo is a 43 y.o. female with sHTN, chronic hypokalemia (due to iatrogenic), obesity and HLD who presents for f/u on BP mgt, recently evaluated at the ER (2024) for hypertensive urgency and had clonidine added to her regimen which she is poorly tolerant to, home BP readings range predominantly in the 140s/90s-100s . She has also gained 10lbs in the past 3 months since she stopped taking the compounded semaglutide that made her feel horrible.     Problem List Addressed This Visit:    As listed below         Health Maintenance   Topic Date Due    Mammogram  2024    TETANUS VACCINE  2033    Hepatitis C Screening  Completed    Lipid Panel  Completed       Past Medical History:   Diagnosis Date    Anxiety     Closed fracture of head of metatarsal, left, with malunion, subsequent encounter 2020    GERD (gastroesophageal reflux disease)     Pain from implanted hardware 2022       Past Surgical History:   Procedure Laterality Date     SECTION      2    CORRECTION OF HAMMER TOE Left 2020    Procedure: CORRECTION, HAMMER TOE;  Surgeon: Ha Goetz DPM;  Location: [facility] OR;  Service: Podiatry;  Laterality: Left;  mini c-arm, Arthrex toe implant, toes 3,4Mallory confirmed 2020 0745 KB    DILATION AND CURETTAGE OF UTERUS      FOOT HARDWARE REMOVAL Left 2022    Procedure: REMOVAL, HARDWARE, FOOT;  Surgeon: Ha Goetz DPM;  Location: [facility] OR;  Service: Podiatry;  Laterality: Left;    HYSTEROSCOPY WITH DILATION AND CURETTAGE OF UTERUS N/A 2023    Procedure: HYSTEROSCOPY, WITH DILATION AND CURETTAGE OF UTERUS;  Surgeon: Sherine Springer MD;  Location: [facility] OR;  Service: OB/GYN;  Laterality: N/A;    LAPAROSCOPIC SALPINGECTOMY Bilateral 2023    Procedure: SALPINGECTOMY,  LAPAROSCOPIC;  Surgeon: Sherine Springer MD;  Location: [facility] OR;  Service: OB/GYN;  Laterality: Bilateral;    OSTEOTOMY OF METATARSAL BONE Left 11/6/2020    Procedure: OSTEOTOMY, METATARSAL BONE;  Surgeon: Ha Goetz DPM;  Location: [facility] OR;  Service: Podiatry;  Laterality: Left;  Arthrex screws, dorsal exostectomy foot/deep peroneal nerve release included (Cuca notified 10/26, EF)    THERMAL ABLATION OF ENDOMETRIUM N/A 11/22/2023    Procedure: ABLATION, ENDOMETRIUM, THERMAL (NOVASURE);  Surgeon: Sherine Springer MD;  Location: [facility] OR;  Service: OB/GYN;  Laterality: N/A;       family history includes Breast cancer in her maternal grandmother; No Known Problems in her father and mother.    Social History     Tobacco Use    Smoking status: Never     Passive exposure: Never    Smokeless tobacco: Never   Substance Use Topics    Alcohol use: Yes     Alcohol/week: 0.8 standard drinks of alcohol     Types: 1 Standard drinks or equivalent per week     Comment: 3     Drug use: No       Review of Systems   Constitutional:  Negative for fatigue and fever.   Respiratory:  Negative for cough and chest tightness.    Gastrointestinal:  Negative for abdominal pain, diarrhea and vomiting.   Endocrine: Negative for polydipsia and polyphagia.   Genitourinary:  Negative for difficulty urinating, dysuria and frequency.   Musculoskeletal:  Negative for arthralgias, back pain, gait problem and joint swelling.   Skin:  Negative for rash.   Neurological:  Negative for seizures, weakness, numbness and headaches.   Psychiatric/Behavioral:  Negative for sleep disturbance.        Outpatient Encounter Medications as of 4/4/2024   Medication Sig Note Dispense Refill    butalbital-acetaminophen-caffeine -40 mg (FIORICET, ESGIC) -40 mg per tablet Take 1 tablet by mouth every 4 (four) hours as needed for Pain. 4/2/2019: Prn       cloNIDine (CATAPRES) 0.1 MG tablet Take 1 tablet (0.1 mg total) by mouth  "every 6 (six) hours as needed (Systolic blood pressure greater than 180 or diastolic blood pressure greater than 100).  15 tablet 0    esomeprazole (NEXIUM) 20 MG capsule Take 20 mg by mouth.       Lactobacillus acidophilus 10 billion cell Cap Take 1 tablet by mouth once daily.       losartan (COZAAR) 50 MG tablet Take 1 tablet (50 mg total) by mouth once daily.  90 tablet 3    mupirocin (BACTROBAN) 2 % ointment Apply topically 2 (two) times daily.  30 g 0    ondansetron (ZOFRAN) 4 MG tablet Take 1 tablet (4 mg total) by mouth every 6 (six) hours as needed for Nausea.  20 tablet 0    [DISCONTINUED] carvediloL (COREG) 3.125 MG tablet Take 1 tablet (3.125 mg total) by mouth 2 (two) times daily with meals.  60 tablet 11    [DISCONTINUED] FLUoxetine 40 MG capsule Take 1 capsule (40 mg total) by mouth once daily.  90 capsule 3    carvediloL (COREG) 12.5 MG tablet Take 1 tablet (12.5 mg total) by mouth 2 (two) times daily with meals.  60 tablet 11    FLUoxetine 60 mg Tab Take 60 mg by mouth once daily.  90 tablet 3    tirzepatide (MOUNJARO) 2.5 mg/0.5 mL PnIj Inject 2.5 mg into the skin every 7 days.  4 Pen 3    [DISCONTINUED] potassium chloride SA (K-DUR,KLOR-CON) 20 MEQ tablet TAKE 1 TABLET(20 MEQ) BY MOUTH EVERY DAY FOR 90 DOSES (Patient not taking: Reported on 4/4/2024)  90 tablet 0     No facility-administered encounter medications on file as of 4/4/2024.        Review of patient's allergies indicates:   Allergen Reactions    Phenergan [promethazine] Hives    Spironolactone Rash       Physical Exam      Vital Signs  Temp: 98.1 °F (36.7 °C)  Temp Source: Temporal  Pulse: 78  SpO2: 98 %  BP: (!) 172/102  BP Location: Left arm  Patient Position: Sitting  Pain Score: 0-No pain  Height and Weight  Height: 5' 9" (175.3 cm)  Weight: 100.5 kg (221 lb 10.8 oz)  BSA (Calculated - sq m): 2.21 sq meters  BMI (Calculated): 32.7  Weight in (lb) to have BMI = 25: 168.9]    Physical Exam  Vitals reviewed.   Constitutional:       " "Appearance: Normal appearance. She is obese.   HENT:      Head: Atraumatic.      Mouth/Throat:      Mouth: Mucous membranes are moist.      Pharynx: Oropharynx is clear.   Eyes:      Extraocular Movements: Extraocular movements intact.      Conjunctiva/sclera: Conjunctivae normal.      Pupils: Pupils are equal, round, and reactive to light.   Cardiovascular:      Rate and Rhythm: Normal rate and regular rhythm.      Pulses: Normal pulses.      Heart sounds: Normal heart sounds.   Pulmonary:      Effort: Pulmonary effort is normal.      Breath sounds: Normal breath sounds.   Musculoskeletal:      Cervical back: Normal range of motion.      Right lower leg: No edema.      Left lower leg: No edema.   Skin:     General: Skin is warm and dry.      Findings: No lesion.   Neurological:      General: No focal deficit present.      Mental Status: She is alert and oriented to person, place, and time.      Sensory: No sensory deficit.   Psychiatric:         Mood and Affect: Mood normal.         Behavior: Behavior normal.          Laboratory:  CBC:  No results for input(s): "WBC", "RBC", "HGB", "HCT", "PLT", "MCV", "MCH", "MCHC" in the last 2160 hours.    CMP:  Recent Labs   Lab Result Units 04/02/24  0606   Glucose mg/dL 110   Calcium mg/dL 9.4   Albumin g/dL 3.9   Total Protein g/dL 6.9   Sodium mmol/L 142   Potassium mmol/L 4.4   CO2 mmol/L 25   Chloride mmol/L 102   BUN mg/dL 16   Alkaline Phosphatase U/L 89   ALT U/L 56*   AST U/L 35   Total Bilirubin mg/dL 0.3       URINALYSIS:  No results for input(s): "COLORU", "CLARITYU", "SPECGRAV", "PHUR", "PROTEINUA", "GLUCOSEU", "BILIRUBINCON", "BLOODU", "WBCU", "RBCU", "BACTERIA", "MUCUS", "NITRITE", "LEUKOCYTESUR", "UROBILINOGEN", "HYALINECASTS" in the last 2160 hours.   LIPIDS:  Recent Labs   Lab Result Units 04/02/24  0606   HDL mg/dL 62   Cholesterol mg/dL 196   Triglycerides mg/dL 99   LDL Cholesterol mg/dL 114.2   HDL/Cholesterol Ratio % 31.6   Non-HDL Cholesterol mg/dL 134 " "  Total Cholesterol/HDL Ratio  3.2       TSH:  No results for input(s): "TSH" in the last 2160 hours.    A1C:  No results for input(s): "HGBA1C" in the last 2160 hours.      Radiology:      Assessment/Plan     Alyssa Robledo is a 43 y.o.female with:    1. Essential hypertension  Overview:  - uncontrolled today  -in the setting of recurrent hypokalemia, patient has been advised to wean off chlorthalidone  -increase coreg to 12.5mg BID  -c/w losartan @ 50mg daily  -monitor and keep BP log BID    2. Hypokalemia due to excessive renal loss of potassium  -now improved since stopping chlorthalidone    3. Mixed hyperlipidemia  -now improved  -c/w current regimen    4. Transaminitis  -likely NAFLD  -weight loss encouraged     5. BMI 32  -c/w life style modifications  -no reports of personal or FH of thyroid cancer, melanoma, GB or pancreatic disease  -could not tolerate ozempic  -trial of mounjaro    6. FLACO  -uncontrolled  -increase fluoxetine to 80mg from 40mg     -Continue current medications and maintain follow up with specialists.      Patient verbalizes understanding and agrees with current treatment plan.      Wanda Wayne MD  [facility]  "

## 2024-04-08 ENCOUNTER — PATIENT MESSAGE (OUTPATIENT)
Dept: FAMILY MEDICINE | Facility: CLINIC | Age: 44
End: 2024-04-08
Payer: COMMERCIAL

## 2024-04-09 ENCOUNTER — TELEPHONE (OUTPATIENT)
Dept: FAMILY MEDICINE | Facility: CLINIC | Age: 44
End: 2024-04-09
Payer: COMMERCIAL

## 2024-04-09 NOTE — PATIENT INSTRUCTIONS
You have been prescribed a steroid today. Take the prescription as directed. Steroids can increase blood sugar. You can also have the following when taking steroids: flushing, jitteriness, weight gain, fluid retention, bone weakening. If you develop any adverse symptoms, stop taking the medication immediately.    You must understand that you've received an Urgent Care treatment only and that you may be released before all your medical problems are known or treated. You, the patient, will arrange for follow up care as instructed.      Follow up with your PCP or specialty clinic as instructed in the next 2-3 days if not improved or as needed. You can call [phone] to schedule an appointment with appropriate provider.      If you condition worsens, we recommend that you receive another evaluation at the emergency room immediately or contact your primary medical clinic's after hours call service to discuss your concerns.      Please return here or go to the Emergency Department for any concerns or worsening condition.      If you were prescribed a narcotic or controlled substance, do not drive or operate heavy equipment or machinery while taking these medications.      Bed/Stretcher in lowest position, wheels locked, appropriate side rails in place/Call bell, personal items and telephone in reach/Instruct patient to call for assistance before getting out of bed/chair/stretcher/Non-slip footwear applied when patient is off stretcher/[place] to call system/Physically safe environment - no spills, clutter or unnecessary equipment/Purposeful proactive rounding/Room/bathroom lighting operational, light cord in reach

## 2024-04-09 NOTE — TELEPHONE ENCOUNTER
----- Message from More Marshall sent at 4/8/2024 12:52 PM CDT -----  Regarding: pa  Type:  Pharmacy Calling to Clarify an RX      Name:[facility]  Prescription Name:FLUoxetine 60 mg Tab  tirzepatide (MOUNJARO) 2.5 mg/0.5 mL PnIj  What do they need to clarify?:prior authorization   Best Call Back Number:[phone]   Additional Information:

## 2024-04-09 NOTE — TELEPHONE ENCOUNTER
----- Message from Eriberto Burrell sent at 4/9/2024  1:15 PM CDT -----  Contact: [employer]  Type: Requesting to speak with nurse        Who Called: Allie   Regarding: checking the status of prior authorization FLUoxetine 60 mg Tab AND tirzepatide (MOUNJARO) 2.5 mg/0.5 mL PnIj  Would the patient rather a call back or a response via [facility]? Call back  Best Call Back Number:   Additional Information:  [phone]

## 2024-04-15 DIAGNOSIS — F41.1 GAD (GENERALIZED ANXIETY DISORDER): Primary | ICD-10-CM

## 2024-04-15 RX ORDER — FLUOXETINE HYDROCHLORIDE 40 MG/1
80 CAPSULE ORAL DAILY
Qty: 180 CAPSULE | Refills: 3 | Status: SHIPPED | OUTPATIENT
Start: 2024-04-15 | End: 2024-04-23

## 2024-04-18 ENCOUNTER — CLINICAL SUPPORT (OUTPATIENT)
Dept: FAMILY MEDICINE | Facility: CLINIC | Age: 44
End: 2024-04-18
Payer: COMMERCIAL

## 2024-04-18 ENCOUNTER — TELEPHONE (OUTPATIENT)
Dept: FAMILY MEDICINE | Facility: CLINIC | Age: 44
End: 2024-04-18
Payer: COMMERCIAL

## 2024-04-18 VITALS — DIASTOLIC BLOOD PRESSURE: 88 MMHG | OXYGEN SATURATION: 98 % | HEART RATE: 61 BPM | SYSTOLIC BLOOD PRESSURE: 136 MMHG

## 2024-04-18 DIAGNOSIS — Z01.30 BLOOD PRESSURE CHECK: Primary | ICD-10-CM

## 2024-04-18 PROCEDURE — 99999 PR PBB SHADOW E&M-EST. PATIENT-LVL II: CPT | Mod: PBBFAC,,,

## 2024-04-18 NOTE — PROGRESS NOTES
Alyssa Robledo 43 y.o. female is here today for Blood Pressure check.   History of HTN yes.    Review of patient's allergies indicates:   Allergen Reactions    Phenergan [promethazine] Hives    Spironolactone Rash     Creatinine   Date Value Ref Range Status   04/02/2024 0.64 0.50 - 1.40 mg/dL Final     Sodium   Date Value Ref Range Status   04/02/2024 142 136 - 145 mmol/L Final     Potassium   Date Value Ref Range Status   04/02/2024 4.4 3.5 - 5.1 mmol/L Final   ]  Patient verifies taking blood pressure medications on a regular basis at the same time of the day.     Current Outpatient Medications:     butalbital-acetaminophen-caffeine -40 mg (FIORICET, ESGIC) -40 mg per tablet, Take 1 tablet by mouth every 4 (four) hours as needed for Pain., Disp: , Rfl:     carvediloL (COREG) 12.5 MG tablet, Take 1 tablet (12.5 mg total) by mouth 2 (two) times daily with meals., Disp: 60 tablet, Rfl: 11    esomeprazole (NEXIUM) 20 MG capsule, Take 20 mg by mouth., Disp: , Rfl:     FLUoxetine 40 MG capsule, Take 2 capsules (80 mg total) by mouth once daily., Disp: 180 capsule, Rfl: 3    Lactobacillus acidophilus 10 billion cell Cap, Take 1 tablet by mouth once daily., Disp: , Rfl:     losartan (COZAAR) 50 MG tablet, Take 1 tablet (50 mg total) by mouth once daily., Disp: 90 tablet, Rfl: 3    mupirocin (BACTROBAN) 2 % ointment, Apply topically 2 (two) times daily., Disp: 30 g, Rfl: 0    ondansetron (ZOFRAN) 4 MG tablet, Take 1 tablet (4 mg total) by mouth every 6 (six) hours as needed for Nausea., Disp: 20 tablet, Rfl: 0    tirzepatide (MOUNJARO) 2.5 mg/0.5 mL PnIj, Inject 2.5 mg into the skin every 7 days., Disp: 4 Pen, Rfl: 3      Does patient have record of home blood pressure readings yes. Readings are scanned in media.   Last dose of blood pressure medication was taken this morning.  Patient is asymptomatic.     Pt has no complaints or concerns. Pt states that she has not been taking the anxiety medication that was  prescribed due to pharmacy and insurance issue (see separate message sent to Dr. Wayne). Pt states that she has been doing good on b/p medications with no issues. I checked pt b/p on [place] and the reading was:    BP: 136/88 , Pulse: 61 .    Informed pt that I would let Dr. Soares know about readings today and medication concern. Pt verbalized understanding and did not have any other questions or concerns.

## 2024-04-18 NOTE — TELEPHONE ENCOUNTER
Pt states that she spoke with her insurance company regarding the fluoxetine rx. Pt states that the insurance company told her that they would cover Fluoxetine 60 mg capsules, not tablet. Pt states that she was told by pharmacy that Fluoxetine 40 mg (take 2 capsules po once daily) was sent in and ready for pickup. Pt states that she does not want to take 80 mg of the medication, she would rather take 60 mg once daily. Pt requesting that Fluoxetine 60 mg capsules be sent to her pharmacy. Please advise.

## 2024-04-22 ENCOUNTER — PATIENT MESSAGE (OUTPATIENT)
Dept: FAMILY MEDICINE | Facility: CLINIC | Age: 44
End: 2024-04-22
Payer: COMMERCIAL

## 2024-04-23 RX ORDER — FLUOXETINE HYDROCHLORIDE 20 MG/1
60 CAPSULE ORAL DAILY
Qty: 270 CAPSULE | Refills: 3 | Status: SHIPPED | OUTPATIENT
Start: 2024-04-23 | End: 2025-04-23

## 2024-05-01 DIAGNOSIS — E66.09 CLASS 1 OBESITY DUE TO EXCESS CALORIES WITH SERIOUS COMORBIDITY AND BODY MASS INDEX (BMI) OF 32.0 TO 32.9 IN ADULT: ICD-10-CM

## 2024-05-01 RX ORDER — TIRZEPATIDE 2.5 MG/.5ML
2.5 INJECTION, SOLUTION SUBCUTANEOUS
Qty: 4 PEN | Refills: 3 | Status: SHIPPED | OUTPATIENT
Start: 2024-05-01

## 2024-05-01 NOTE — TELEPHONE ENCOUNTER
----- Message from More Marshall sent at 5/1/2024 10:26 AM CDT -----  Regarding: call  Type:  Needs Medical Advice    Who Called: pt  Would the patient rather a call back or a response via [facility]? Call  Best Call Back Number: [phone]  Additional Information: pt stated she would like to speak with someone in the office in regards to her medication she stated her insurance changed

## 2024-05-01 NOTE — TELEPHONE ENCOUNTER
Returned pt phone call; Pt states that she has changed her insurance and wants to Know if Dr. Soares can resend her a new Mounjaro prescription to [facility]; Pt states her old insurance, ([employer]) did not cover the Mounjaro that why she switched over to a new company; Pt states that she spoke with her new insurance company and says they will accept her prescription for Mounjaro due to having high b/p and high BMI; Please advise pt request

## 2024-05-01 NOTE — TELEPHONE ENCOUNTER
Care Due:                  Date            Visit Type   Department     Provider  --------------------------------------------------------------------------------                                MYCHART                              FOLLOWUP/OF  DESC FAMILY  Last Visit: 04-      FICE VISIT   [facility]                              EP -                              PRIMARY      DESC FAMILY  Next Visit: 05-      CARE ([facility])   [facility]                                                            Last  Test          Frequency    Reason                     Performed    Due Date  --------------------------------------------------------------------------------    HBA1C.......  6 months...  tirzepatide..............  12- 06-    [employer] Embedded Care Due Messages. Reference number: [id number].   5/01/2024 10:36:42 AM CDT

## 2024-05-08 ENCOUNTER — PATIENT MESSAGE (OUTPATIENT)
Dept: FAMILY MEDICINE | Facility: CLINIC | Age: 44
End: 2024-05-08
Payer: COMMERCIAL

## 2024-06-18 ENCOUNTER — OFFICE VISIT (OUTPATIENT)
Dept: FAMILY MEDICINE | Facility: CLINIC | Age: 44
End: 2024-06-18
Payer: COMMERCIAL

## 2024-06-18 VITALS
HEIGHT: 69 IN | SYSTOLIC BLOOD PRESSURE: 118 MMHG | BODY MASS INDEX: 32.78 KG/M2 | DIASTOLIC BLOOD PRESSURE: 84 MMHG | OXYGEN SATURATION: 98 % | WEIGHT: 221.31 LBS | HEART RATE: 82 BPM | TEMPERATURE: 99 F

## 2024-06-18 DIAGNOSIS — I10 ESSENTIAL HYPERTENSION: Primary | ICD-10-CM

## 2024-06-18 DIAGNOSIS — R74.01 TRANSAMINITIS: ICD-10-CM

## 2024-06-18 DIAGNOSIS — E66.09 CLASS 1 OBESITY DUE TO EXCESS CALORIES WITH SERIOUS COMORBIDITY AND BODY MASS INDEX (BMI) OF 32.0 TO 32.9 IN ADULT: ICD-10-CM

## 2024-06-18 DIAGNOSIS — E78.2 MIXED HYPERLIPIDEMIA: ICD-10-CM

## 2024-06-18 DIAGNOSIS — F41.1 GAD (GENERALIZED ANXIETY DISORDER): ICD-10-CM

## 2024-06-18 PROCEDURE — 3008F BODY MASS INDEX DOCD: CPT | Mod: CPTII,S$GLB,, | Performed by: STUDENT IN AN ORGANIZED HEALTH CARE EDUCATION/TRAINING PROGRAM

## 2024-06-18 PROCEDURE — 3079F DIAST BP 80-89 MM HG: CPT | Mod: CPTII,S$GLB,, | Performed by: STUDENT IN AN ORGANIZED HEALTH CARE EDUCATION/TRAINING PROGRAM

## 2024-06-18 PROCEDURE — 1160F RVW MEDS BY RX/DR IN RCRD: CPT | Mod: CPTII,S$GLB,, | Performed by: STUDENT IN AN ORGANIZED HEALTH CARE EDUCATION/TRAINING PROGRAM

## 2024-06-18 PROCEDURE — 99214 OFFICE O/P EST MOD 30 MIN: CPT | Mod: S$GLB,,, | Performed by: STUDENT IN AN ORGANIZED HEALTH CARE EDUCATION/TRAINING PROGRAM

## 2024-06-18 PROCEDURE — 3074F SYST BP LT 130 MM HG: CPT | Mod: CPTII,S$GLB,, | Performed by: STUDENT IN AN ORGANIZED HEALTH CARE EDUCATION/TRAINING PROGRAM

## 2024-06-18 PROCEDURE — 1159F MED LIST DOCD IN RCRD: CPT | Mod: CPTII,S$GLB,, | Performed by: STUDENT IN AN ORGANIZED HEALTH CARE EDUCATION/TRAINING PROGRAM

## 2024-06-18 PROCEDURE — 4010F ACE/ARB THERAPY RXD/TAKEN: CPT | Mod: CPTII,S$GLB,, | Performed by: STUDENT IN AN ORGANIZED HEALTH CARE EDUCATION/TRAINING PROGRAM

## 2024-06-18 PROCEDURE — 99999 PR PBB SHADOW E&M-EST. PATIENT-LVL IV: CPT | Mod: PBBFAC,,, | Performed by: STUDENT IN AN ORGANIZED HEALTH CARE EDUCATION/TRAINING PROGRAM

## 2024-06-18 NOTE — PROGRESS NOTES
[facility] Note    Chief Complaint      Chief Complaint   Patient presents with    F/u on BP mgt     History of Present Illness      Follow-up  Pertinent negatives include no abdominal pain, arthralgias, coughing, fatigue, fever, headaches, joint swelling, numbness, rash, vomiting or weakness.       Alyssa Robledo is a 43 y.o. female with sHTN, chronic hypokalemia (due to iatrogenic), obesity and HLD who presents for f/u on BP mgt. She endorses no new complaints today, compliant and tolerating current regimen.     Problem List Addressed This Visit:    As listed below         Health Maintenance   Topic Date Due    Mammogram  2024    TETANUS VACCINE  2033    Hepatitis C Screening  Completed    Lipid Panel  Completed       Past Medical History:   Diagnosis Date    Anxiety     Closed fracture of head of metatarsal, left, with malunion, subsequent encounter 2020    GERD (gastroesophageal reflux disease)     Pain from implanted hardware 2022       Past Surgical History:   Procedure Laterality Date     SECTION      2    CORRECTION OF HAMMER TOE Left 2020    Procedure: CORRECTION, HAMMER TOE;  Surgeon: Ha Goetz DPM;  Location: [facility] OR;  Service: Podiatry;  Laterality: Left;  mini c-arm, Arthrex toe implant, toes 3,4Mallory confirmed 2020 0745 KB    DILATION AND CURETTAGE OF UTERUS      FOOT HARDWARE REMOVAL Left 2022    Procedure: REMOVAL, HARDWARE, FOOT;  Surgeon: Ha Goetz DPM;  Location: [facility] OR;  Service: Podiatry;  Laterality: Left;    HYSTEROSCOPY WITH DILATION AND CURETTAGE OF UTERUS N/A 2023    Procedure: HYSTEROSCOPY, WITH DILATION AND CURETTAGE OF UTERUS;  Surgeon: Sherine Springer MD;  Location: [facility] OR;  Service: OB/GYN;  Laterality: N/A;    LAPAROSCOPIC SALPINGECTOMY Bilateral 2023    Procedure: SALPINGECTOMY, LAPAROSCOPIC;  Surgeon: Sherine Springer MD;  Location: [facility] OR;  Service: OB/GYN;   Laterality: Bilateral;    OSTEOTOMY OF METATARSAL BONE Left 11/6/2020    Procedure: OSTEOTOMY, METATARSAL BONE;  Surgeon: Ha Goetz DPM;  Location: [facility] OR;  Service: Podiatry;  Laterality: Left;  Arthrex screws, dorsal exostectomy foot/deep peroneal nerve release included (Cuca notified 10/26, EF)    THERMAL ABLATION OF ENDOMETRIUM N/A 11/22/2023    Procedure: ABLATION, ENDOMETRIUM, THERMAL (NOVASURE);  Surgeon: Sherine Springer MD;  Location: [facility] OR;  Service: OB/GYN;  Laterality: N/A;       family history includes Breast cancer in her maternal grandmother; No Known Problems in her father and mother.    Social History     Tobacco Use    Smoking status: Never     Passive exposure: Never    Smokeless tobacco: Never   Substance Use Topics    Alcohol use: Yes     Alcohol/week: 0.8 standard drinks of alcohol     Types: 1 Standard drinks or equivalent per week     Comment: 3     Drug use: No       Review of Systems   Constitutional:  Negative for fatigue and fever.   Respiratory:  Negative for cough and chest tightness.    Gastrointestinal:  Negative for abdominal pain, diarrhea and vomiting.   Endocrine: Negative for polydipsia and polyphagia.   Genitourinary:  Negative for difficulty urinating, dysuria and frequency.   Musculoskeletal:  Negative for arthralgias, back pain, gait problem and joint swelling.   Skin:  Negative for rash.   Neurological:  Negative for seizures, weakness, numbness and headaches.   Psychiatric/Behavioral:  Negative for sleep disturbance.        Outpatient Encounter Medications as of 6/18/2024   Medication Sig Dispense Refill    butalbital-acetaminophen-caffeine -40 mg (FIORICET, ESGIC) -40 mg per tablet Take 1 tablet by mouth every 4 (four) hours as needed for Pain.      carvediloL (COREG) 12.5 MG tablet Take 1 tablet (12.5 mg total) by mouth 2 (two) times daily with meals. 60 tablet 11    esomeprazole (NEXIUM) 20 MG capsule Take 20 mg by mouth.       "FLUoxetine 20 MG capsule Take 3 capsules (60 mg total) by mouth once daily. 270 capsule 3    Lactobacillus acidophilus 10 billion cell Cap Take 1 tablet by mouth once daily.      losartan (COZAAR) 50 MG tablet Take 1 tablet (50 mg total) by mouth once daily. 90 tablet 3    mupirocin (BACTROBAN) 2 % ointment Apply topically 2 (two) times daily. 30 g 0    ondansetron (ZOFRAN) 4 MG tablet Take 1 tablet (4 mg total) by mouth every 6 (six) hours as needed for Nausea. 20 tablet 0    tirzepatide (MOUNJARO) 2.5 mg/0.5 mL PnIj Inject 2.5 mg into the skin every 7 days. 4 Pen 3     No facility-administered encounter medications on file as of 6/18/2024.        Review of patient's allergies indicates:   Allergen Reactions    Phenergan [promethazine] Hives    Spironolactone Rash       Physical Exam      Vital Signs  Temp: 98.8 °F (37.1 °C)  Temp Source: Temporal  Pulse: 82  SpO2: 98 %  BP: 118/84  BP Location: Left arm  Patient Position: Sitting  Pain Score: 0-No pain  Height and Weight  Height: 5' 9" (175.3 cm)  Weight: 100.4 kg (221 lb 5.5 oz)  BSA (Calculated - sq m): 2.21 sq meters  BMI (Calculated): 32.7  Weight in (lb) to have BMI = 25: 168.9]    Physical Exam  Vitals reviewed.   Constitutional:       Appearance: Normal appearance. She is obese.   Cardiovascular:      Rate and Rhythm: Normal rate and regular rhythm.      Pulses: Normal pulses.      Heart sounds: Normal heart sounds.   Pulmonary:      Effort: Pulmonary effort is normal.      Breath sounds: Normal breath sounds.   Musculoskeletal:      Right lower leg: No edema.      Left lower leg: No edema.   Neurological:      General: No focal deficit present.      Mental Status: She is alert and oriented to person, place, and time.      Sensory: No sensory deficit.   Psychiatric:         Mood and Affect: Mood normal.         Behavior: Behavior normal.          Laboratory:  CBC:  No results for input(s): "WBC", "RBC", "HGB", "HCT", "PLT", "MCV", "MCH", "MCHC" in the last " "2160 hours.    CMP:  Recent Labs   Lab Result Units 04/02/24  0606   Glucose mg/dL 110   Calcium mg/dL 9.4   Albumin g/dL 3.9   Total Protein g/dL 6.9   Sodium mmol/L 142   Potassium mmol/L 4.4   CO2 mmol/L 25   Chloride mmol/L 102   BUN mg/dL 16   Alkaline Phosphatase U/L 89   ALT U/L 56*   AST U/L 35   Total Bilirubin mg/dL 0.3     URINALYSIS:  No results for input(s): "COLORU", "CLARITYU", "SPECGRAV", "PHUR", "PROTEINUA", "GLUCOSEU", "BILIRUBINCON", "BLOODU", "WBCU", "RBCU", "BACTERIA", "MUCUS", "NITRITE", "LEUKOCYTESUR", "UROBILINOGEN", "HYALINECASTS" in the last 2160 hours.   LIPIDS:  Recent Labs   Lab Result Units 04/02/24  0606   HDL mg/dL 62   Cholesterol mg/dL 196   Triglycerides mg/dL 99   LDL Cholesterol mg/dL 114.2   HDL/Cholesterol Ratio % 31.6   Non-HDL Cholesterol mg/dL 134   Total Cholesterol/HDL Ratio  3.2     TSH:  No results for input(s): "TSH" in the last 2160 hours.    A1C:  No results for input(s): "HGBA1C" in the last 2160 hours.      Radiology:      Assessment/Plan     Alyssa Robledo is a 43 y.o.female with:    1. Essential hypertension  Overview:  - now @ goal  -in the setting of recurrent hypokalemia, chlorthalidone was discontinued  -c/w coreg 12.5mg BID  -c/w losartan @ 50mg daily  -monitor and keep BP log BID    2. Mixed hyperlipidemia  -now improved  -c/w current regimen    3. Transaminitis  -likely NAFLD  -weight loss encouraged     4. BMI 32  -c/w life style modifications  -no reports of personal or FH of thyroid cancer, melanoma, GB or pancreatic disease  -could not tolerate ozempic but gets prescribed mounjaro through outside faciity    5. FLACO  -now controlled on current regimen    Patient verbalizes understanding and agrees with current treatment plan.      Wanda Wayne MD  [facility]    "

## 2024-07-26 DIAGNOSIS — M25.521 ELBOW PAIN, CHRONIC, RIGHT: Primary | ICD-10-CM

## 2024-07-26 DIAGNOSIS — G89.29 ELBOW PAIN, CHRONIC, RIGHT: Primary | ICD-10-CM

## 2024-07-29 ENCOUNTER — OFFICE VISIT (OUTPATIENT)
Dept: ORTHOPEDICS | Facility: CLINIC | Age: 44
End: 2024-07-29
Payer: COMMERCIAL

## 2024-07-29 ENCOUNTER — HOSPITAL ENCOUNTER (OUTPATIENT)
Dept: RADIOLOGY | Facility: HOSPITAL | Age: 44
Discharge: HOME | End: 2024-07-29
Attending: PHYSICIAN ASSISTANT
Payer: COMMERCIAL

## 2024-07-29 VITALS — WEIGHT: 215.81 LBS | BODY MASS INDEX: 31.96 KG/M2 | HEIGHT: 69 IN

## 2024-07-29 DIAGNOSIS — M25.521 RIGHT ELBOW PAIN: ICD-10-CM

## 2024-07-29 DIAGNOSIS — G89.29 ELBOW PAIN, CHRONIC, RIGHT: ICD-10-CM

## 2024-07-29 DIAGNOSIS — M25.521 ELBOW PAIN, CHRONIC, RIGHT: ICD-10-CM

## 2024-07-29 DIAGNOSIS — M77.11 LATERAL EPICONDYLITIS OF RIGHT ELBOW: Primary | ICD-10-CM

## 2024-07-29 PROCEDURE — 4010F ACE/ARB THERAPY RXD/TAKEN: CPT | Mod: CPTII,S$GLB,, | Performed by: PHYSICIAN ASSISTANT

## 2024-07-29 PROCEDURE — 99999 PR PBB SHADOW E&M-EST. PATIENT-LVL IV: CPT | Mod: PBBFAC,,, | Performed by: PHYSICIAN ASSISTANT

## 2024-07-29 PROCEDURE — 73080 X-RAY EXAM OF ELBOW: CPT | Mod: 26,RT,, | Performed by: RADIOLOGY

## 2024-07-29 PROCEDURE — 99203 OFFICE O/P NEW LOW 30 MIN: CPT | Mod: S$GLB,,, | Performed by: PHYSICIAN ASSISTANT

## 2024-07-29 PROCEDURE — 1159F MED LIST DOCD IN RCRD: CPT | Mod: CPTII,S$GLB,, | Performed by: PHYSICIAN ASSISTANT

## 2024-07-29 PROCEDURE — 73080 X-RAY EXAM OF ELBOW: CPT | Mod: TC,PN,RT

## 2024-07-29 PROCEDURE — 1160F RVW MEDS BY RX/DR IN RCRD: CPT | Mod: CPTII,S$GLB,, | Performed by: PHYSICIAN ASSISTANT

## 2024-07-29 PROCEDURE — 3008F BODY MASS INDEX DOCD: CPT | Mod: CPTII,S$GLB,, | Performed by: PHYSICIAN ASSISTANT

## 2024-07-29 NOTE — PROGRESS NOTES
Subjective:      Chief Complaint: Pain of the Right Elbow    Patient ID: Alyssa Robledo is a 43 y.o. female.  44yo RHD F presents with 1 month history of right elbow pain.  Her pain is laterally.  She noted pain after putting furniture together with a screwdriver.  She has pain with twisting, writing, using her hands to open objects.  Pain wakes her up at night.  She denies radicular pain or paresthesias.  She denies hand pain.  She has used a tennis elbow strap, ibuprofen, and Voltaren gel with minimal relief.        Past Medical History:   Diagnosis Date    Anxiety     Closed fracture of head of metatarsal, left, with malunion, subsequent encounter 2020    GERD (gastroesophageal reflux disease)     Pain from implanted hardware 2022        Past Surgical History:   Procedure Laterality Date     SECTION      2    CORRECTION OF HAMMER TOE Left 2020    Procedure: CORRECTION, HAMMER TOE;  Surgeon: Ha Goetz DPM;  Location: [facility] OR;  Service: Podiatry;  Laterality: Left;  mini c-arm, Arthrex toe implant, toes 3,4Mallory confirmed 2020 0745 KB    DILATION AND CURETTAGE OF UTERUS      FOOT HARDWARE REMOVAL Left 2022    Procedure: REMOVAL, HARDWARE, FOOT;  Surgeon: Ha Goetz DPM;  Location: [facility] OR;  Service: Podiatry;  Laterality: Left;    HYSTEROSCOPY WITH DILATION AND CURETTAGE OF UTERUS N/A 2023    Procedure: HYSTEROSCOPY, WITH DILATION AND CURETTAGE OF UTERUS;  Surgeon: Sherine Springer MD;  Location: [facility] OR;  Service: OB/GYN;  Laterality: N/A;    LAPAROSCOPIC SALPINGECTOMY Bilateral 2023    Procedure: SALPINGECTOMY, LAPAROSCOPIC;  Surgeon: Sherine Springer MD;  Location: [facility] OR;  Service: OB/GYN;  Laterality: Bilateral;    OSTEOTOMY OF METATARSAL BONE Left 2020    Procedure: OSTEOTOMY, METATARSAL BONE;  Surgeon: Ha Goetz DPM;  Location: [facility] OR;  Service: Podiatry;  Laterality: Left;  Arthrex screws, dorsal  "exostectomy foot/deep peroneal nerve release included (Cuca notified 10/26, EF)    THERMAL ABLATION OF ENDOMETRIUM N/A 11/22/2023    Procedure: ABLATION, ENDOMETRIUM, THERMAL (NOVASURE);  Surgeon: Sherine Springer MD;  [facility];  Service: OB/GYN;  Laterality: N/A;        Current Outpatient Medications   Medication Instructions    butalbital-acetaminophen-caffeine -40 mg (FIORICET, ESGIC) -40 mg per tablet 1 tablet, Oral, Every 4 hours PRN    carvediloL (COREG) 12.5 mg, Oral, 2 times daily with meals    esomeprazole (NEXIUM) 20 mg, Oral    FLUoxetine 60 mg, Oral, Daily    Lactobacillus acidophilus 10 billion cell Cap 1 tablet, Oral, Daily    losartan (COZAAR) 50 mg, Oral, Daily    MOUNJARO 2.5 mg, Subcutaneous, Every 7 days    mupirocin (BACTROBAN) 2 % ointment Topical (Top), 2 times daily    ondansetron (ZOFRAN) 4 mg, Oral, Every 6 hours PRN        Review of patient's allergies indicates:   Allergen Reactions    Phenergan [promethazine] Hives    Spironolactone Rash       Review of Systems   Constitutional: Negative for fever and malaise/fatigue.   Eyes:  Negative for blurred vision.   Cardiovascular:  Negative for chest pain.   Respiratory:  Negative for shortness of breath.    Skin:  Negative for poor wound healing.   Musculoskeletal:  Positive for joint pain and myalgias.   Genitourinary:  Negative for bladder incontinence.   Neurological:  Negative for dizziness, numbness and paresthesias.   Psychiatric/Behavioral:  Negative for altered mental status.        The patient's relevant past medical, surgical, and social history was reviewed in Epic.       Objective:      VITAL SIGNS: Ht 5' 9" (1.753 m)   Wt 97.9 kg (215 lb 13.3 oz)   BMI 31.87 kg/m²     General    Nursing note and vitals reviewed.  Constitutional: She is oriented to person, place, and time. She appears well-developed and well-nourished.   Neurological: She is alert and oriented to person, place, and time. "             Right Hand/Wrist Exam     Range of Motion     Wrist   Extension:  normal   Flexion:  normal   Abduction: normal  Adduction: normal      Right Elbow Exam     Tenderness   The patient is tender to palpation of the lateral epicondyle.     Range of Motion   Extension:  normal   Flexion:  normal   Pronation:  normal   Supination:  normal     Tests   Tinel's sign (cubital tunnel): negative  Tennis Elbow: moderate    Other   Sensation: normal          Muscle Strength   Right Upper Extremity   Wrist extension: 4/5   Wrist flexion: 4/5   : 5/5   Elbow Pronation:  5/5   Elbow Supination:  5/5   Elbow Extension: 4/5  Elbow Flexion: 4/5    Vascular Exam     Right Pulses      Radial:                    2+         Imaging  X-Ray Elbow Complete Right  Narrative: EXAMINATION:  XR ELBOW COMPLETE 3 VIEW RIGHT    CLINICAL HISTORY:  . Pain in right elbow    TECHNIQUE:  AP, lateral, and oblique views of the right elbow were performed.    COMPARISON:  None    FINDINGS:  Bony structures of the elbow are intact.  No joint effusion or bony spurring can be seen.  Impression: See above    Electronically signed by: Mekhi Haines MD  Date:    07/29/2024  Time:    13:44    I have reviewed the above radiograph and agree with the findings stated by the radiologist.           Assessment:       Alyssa Robledo is a 43 y.o. female seen in the office today for   1. Lateral epicondylitis of right elbow    2. Right elbow pain     right lateral epicondylitis. Explained the etiology of the condition to the patient. Usually due to overuse. I recommend conservative treatment at this time which includes stopping aggravating movements, rest, ice, NSAIDs, tennis elbow brace use, physical therapy and/or injections.  Will give her a right wrist brace today to limit wrist range of motion as well as referral to PT/OT in [place]. May continue OTC meds for pain      Plan:       Alyssa was seen today for pain.    Diagnoses and all orders for this  visit:    Lateral epicondylitis of right elbow  -     WRIST BRACE FOR HOME USE  -     Ambulatory referral/consult to Physical/Occupational Therapy; Future    Right elbow pain  -     WRIST BRACE FOR HOME USE  -     Ambulatory referral/consult to Physical/Occupational Therapy; Future    PT/OT referral in [place]  Wrist brace dispensed  OTC meds for pain  Follow up in 6-8 weeks. Consider injection if no relief.       Diagnoses and plan discussed with the patient, as well as the expected course and duration of his symptoms.  All questions and concerns were addressed prior to the end of the visit.   Instructed patient to call office if they have any future questions/concerns or to schedule apt. Patient will return to see me if symptoms worsen or fail to improve    Note dictated with voice recognition software, please excuse any grammatical errors.        Carolyn Can PA-C      Department of Orthopedic Surgery  [facility]  Office: [phone]  07/29/2024

## 2024-08-07 ENCOUNTER — PATIENT MESSAGE (OUTPATIENT)
Dept: ORTHOPEDICS | Facility: CLINIC | Age: 44
End: 2024-08-07
Payer: COMMERCIAL

## 2024-08-12 PROBLEM — R52 PAIN AGGRAVATED BY ACTIVITIES OF DAILY LIVING: Status: ACTIVE | Noted: 2024-08-12

## 2024-08-26 ENCOUNTER — PATIENT MESSAGE (OUTPATIENT)
Dept: ORTHOPEDICS | Facility: CLINIC | Age: 44
End: 2024-08-26
Payer: COMMERCIAL

## 2024-08-30 ENCOUNTER — PATIENT MESSAGE (OUTPATIENT)
Dept: FAMILY MEDICINE | Facility: CLINIC | Age: 44
End: 2024-08-30
Payer: COMMERCIAL

## 2024-08-30 ENCOUNTER — PATIENT MESSAGE (OUTPATIENT)
Dept: OBSTETRICS AND GYNECOLOGY | Facility: CLINIC | Age: 44
End: 2024-08-30
Payer: COMMERCIAL

## 2024-09-04 ENCOUNTER — E-VISIT (OUTPATIENT)
Dept: FAMILY MEDICINE | Facility: CLINIC | Age: 44
End: 2024-09-04
Payer: COMMERCIAL

## 2024-09-04 DIAGNOSIS — L02.421 FURUNCLE OF RIGHT AXILLA: Primary | ICD-10-CM

## 2024-09-04 RX ORDER — DOXYCYCLINE 100 MG/1
100 CAPSULE ORAL EVERY 12 HOURS
Qty: 20 CAPSULE | Refills: 0 | Status: SHIPPED | OUTPATIENT
Start: 2024-09-04 | End: 2024-09-14

## 2024-09-04 NOTE — PROGRESS NOTES
Patient ID: Alyssa Robledo is a 44 y.o. female.    Chief Complaint: General Illness (Entered automatically based on patient selection in [employer].)    The patient initiated a request through [employer] on 9/4/2024 for evaluation and management with a chief complaint of General Illness (Entered automatically based on patient selection in [employer].)     I evaluated the questionnaire submission on 09/04/2024    [facility] E[facility]-Skin Hair Nails    9/4/2024 10:34 AM CDT - Filed by Patient   What do you need help with? Skin   What concern do you have about your skin? Other Concern   Do you agree to participate in an E-Visit? Yes   If you have any of the following symptoms, please present to your local emergency room or call 911:  I acknowledge   Are you pregnant, could you be pregnant, or are you breast feeding? None of the above   What is the main issue you would like addressed today? Burning pain to skin, lumps in axilla   Please describe your symptoms Burning pain to skin   Where is your problem located? Right axilla, side and breast   How severe are your symptoms? Moderate   Have you had these symptoms before? No   How long have you been having these symptoms? For a week   Please list any medications or treatments you have used for your condition and indicate if it was effective or not. N/a   What makes this feel better? Not touching skin   What makes this feel worse? Touching skin   Are these symptoms related to a condition that you currently have? No   Please describe any probable cause for these symptoms N/a   Provide any additional information you feel is important. N/a   Please attach any relevant images or files    Are you able to take your vital signs? Yes   Systolic Blood Pressure: 139   Diastolic Blood Pressure: 89   Weight: 209   Height: 69   Pulse: 70   Temperature: 98   Respiration rate:    Pulse Oxygen: 99         No diagnosis found.     No orders of the defined types were placed in this  encounter.           No follow-ups on file.      E-Visit Time Tracking: 15

## 2024-09-05 ENCOUNTER — OFFICE VISIT (OUTPATIENT)
Facility: CLINIC | Age: 44
End: 2024-09-05
Payer: COMMERCIAL

## 2024-09-05 VITALS
DIASTOLIC BLOOD PRESSURE: 92 MMHG | BODY MASS INDEX: 31.13 KG/M2 | HEIGHT: 69 IN | SYSTOLIC BLOOD PRESSURE: 138 MMHG | HEART RATE: 86 BPM | WEIGHT: 210.19 LBS

## 2024-09-05 DIAGNOSIS — N64.4 BREAST PAIN: Primary | ICD-10-CM

## 2024-09-05 PROCEDURE — 99213 OFFICE O/P EST LOW 20 MIN: CPT | Mod: S$GLB,,, | Performed by: STUDENT IN AN ORGANIZED HEALTH CARE EDUCATION/TRAINING PROGRAM

## 2024-09-05 PROCEDURE — 3008F BODY MASS INDEX DOCD: CPT | Mod: CPTII,S$GLB,, | Performed by: STUDENT IN AN ORGANIZED HEALTH CARE EDUCATION/TRAINING PROGRAM

## 2024-09-05 PROCEDURE — 3080F DIAST BP >= 90 MM HG: CPT | Mod: CPTII,S$GLB,, | Performed by: STUDENT IN AN ORGANIZED HEALTH CARE EDUCATION/TRAINING PROGRAM

## 2024-09-05 PROCEDURE — 4010F ACE/ARB THERAPY RXD/TAKEN: CPT | Mod: CPTII,S$GLB,, | Performed by: STUDENT IN AN ORGANIZED HEALTH CARE EDUCATION/TRAINING PROGRAM

## 2024-09-05 PROCEDURE — 99999 PR PBB SHADOW E&M-EST. PATIENT-LVL III: CPT | Mod: PBBFAC,,, | Performed by: STUDENT IN AN ORGANIZED HEALTH CARE EDUCATION/TRAINING PROGRAM

## 2024-09-05 PROCEDURE — 3075F SYST BP GE 130 - 139MM HG: CPT | Mod: CPTII,S$GLB,, | Performed by: STUDENT IN AN ORGANIZED HEALTH CARE EDUCATION/TRAINING PROGRAM

## 2024-09-05 PROCEDURE — 1159F MED LIST DOCD IN RCRD: CPT | Mod: CPTII,S$GLB,, | Performed by: STUDENT IN AN ORGANIZED HEALTH CARE EDUCATION/TRAINING PROGRAM

## 2024-09-05 RX ORDER — FLUCONAZOLE 150 MG/1
150 TABLET ORAL DAILY
Qty: 1 TABLET | Refills: 0 | Status: SHIPPED | OUTPATIENT
Start: 2024-09-05 | End: 2024-09-06

## 2024-09-05 NOTE — PROGRESS NOTES
Subjective:    Patient ID: Alyssa Robledo is a 44 y.o. y.o. female.     Chief Complaint:   Chief Complaint   Patient presents with    Breast Pain     Last MMG 9/2022       History of Present Illness:   Alyssa presents for evaluation of burning of skin of the right breast and an area of redness noted by SBE 2 weeks ago. She denies a breast lump on exam, nipple discharge, and fever. Symptoms have been persistent since beginning. No LMP recorded. Patient has had an ablation..       ROS:   CONSTITUTIONAL: Negative for fever, chills, diaphoresis, weakness, fatigue, weight loss, weight gain  GASTROINTESTINAL: negative for abdominal pain, flank pain, nausea, vomiting, diarrhea, constipation, black stool, blood in stool  BREAST: see HPI  GENITOURINARY: negative for dysuria, frequency/urgency, hematuria, genital discharge, vaginal bleeding, irregular menses, heavy menses, pelvic pain  HEMATOLOGIC/LYMPHATIC: negative for swollen lymph nodes, bleeding, bruising  MUSCULOSKELETAL: negative for back pain, joint pain, joint stiffness, joint swelling, muscle pain, muscle weakness  ENDOCRINE: negative for polydipsia/polyuria, palpitations, skin changes, temperature intolerance, unexpected weight changes      Physical Exam:   Vital Signs:  Vitals:    09/05/24 0914   BP: (!) 138/92   Pulse: 86        Physical Exam:   Constitutional: She is oriented to person, place, and time. She appears well-developed and well-nourished. No distress.    HENT:   Head: Normocephalic and atraumatic.    Eyes: Pupils are equal, round, and reactive to light. EOM are normal.     Cardiovascular:  Normal rate.             Pulmonary/Chest: Effort normal. No respiratory distress. She exhibits no mass, no tenderness, no laceration, no edema and no swelling. Right breast exhibits skin change (small sore on the side of the breast below the axilla.), tenderness and swelling (small mobile mass in axilla). Right breast exhibits no inverted nipple, no mass, no nipple  discharge and no bleeding. Left breast exhibits no inverted nipple, no mass, no nipple discharge, no skin change, no tenderness, no bleeding and no swelling.                      Neurological: She is alert and oriented to person, place, and time.     Psychiatric: She has a normal mood and affect. Her behavior is normal. Judgment and thought content normal.           Assessment:      1. Breast pain          Plan:      Problem List Items Addressed This Visit    None  Visit Diagnoses       Breast pain    -  Primary    Relevant Orders    Mammo Digital Diagnostic Bilat with Aníbal    US Breast Right Complete

## 2024-09-30 ENCOUNTER — OFFICE VISIT (OUTPATIENT)
Dept: ORTHOPEDICS | Facility: CLINIC | Age: 44
End: 2024-09-30
Payer: COMMERCIAL

## 2024-09-30 ENCOUNTER — HOSPITAL ENCOUNTER (OUTPATIENT)
Dept: RADIOLOGY | Facility: HOSPITAL | Age: 44
Discharge: HOME | End: 2024-09-30
Attending: PHYSICIAN ASSISTANT
Payer: COMMERCIAL

## 2024-09-30 VITALS — HEIGHT: 69 IN | BODY MASS INDEX: 31.28 KG/M2 | WEIGHT: 211.19 LBS

## 2024-09-30 DIAGNOSIS — M79.672 LEFT FOOT PAIN: ICD-10-CM

## 2024-09-30 DIAGNOSIS — S92.502A CLOSED FRACTURE OF PHALANX OF LEFT FOURTH TOE, INITIAL ENCOUNTER: ICD-10-CM

## 2024-09-30 DIAGNOSIS — M77.11 LATERAL EPICONDYLITIS OF RIGHT ELBOW: Primary | ICD-10-CM

## 2024-09-30 PROCEDURE — 1159F MED LIST DOCD IN RCRD: CPT | Mod: CPTII,S$GLB,, | Performed by: PHYSICIAN ASSISTANT

## 2024-09-30 PROCEDURE — 73630 X-RAY EXAM OF FOOT: CPT | Mod: 26,LT,, | Performed by: RADIOLOGY

## 2024-09-30 PROCEDURE — 3008F BODY MASS INDEX DOCD: CPT | Mod: CPTII,S$GLB,, | Performed by: PHYSICIAN ASSISTANT

## 2024-09-30 PROCEDURE — 99214 OFFICE O/P EST MOD 30 MIN: CPT | Mod: S$GLB,,, | Performed by: PHYSICIAN ASSISTANT

## 2024-09-30 PROCEDURE — 99999 PR PBB SHADOW E&M-EST. PATIENT-LVL III: CPT | Mod: PBBFAC,,, | Performed by: PHYSICIAN ASSISTANT

## 2024-09-30 PROCEDURE — 73630 X-RAY EXAM OF FOOT: CPT | Mod: TC,PN,LT

## 2024-09-30 PROCEDURE — 4010F ACE/ARB THERAPY RXD/TAKEN: CPT | Mod: CPTII,S$GLB,, | Performed by: PHYSICIAN ASSISTANT

## 2024-09-30 NOTE — PROGRESS NOTES
Subjective:      Chief Complaint: Pain of the Right Elbow and Follow-up    Patient ID: Alyssa Robledo is a 44 y.o. female.  43yo F presents for follow-up right elbow lateral epicondylitis.  She has been in occupational therapy since the beginning of August.  She notes significant improvement in symptoms. Notes she is able to write,  and hold objects without symptoms.  Her main complaint is pain with elbow extension and at night.  She only uses her brace at night.  She is still in occupational therapy.  Also complaining of left foot pain since Friday.  States she was going up the stairs and missed a step.  Her toes extended and she hit the step.  Her pain is along the 4th metatarsal.  Pain is causing her to limp.  She has a history of a 5th metatarsal fracture/ surgery as well as some type of clawtoe/ hammertoe deformity surgery of the 4th toe.        Past Medical History:   Diagnosis Date    Anxiety     Closed fracture of head of metatarsal, left, with malunion, subsequent encounter 2020    GERD (gastroesophageal reflux disease)     Pain from implanted hardware 2022        Past Surgical History:   Procedure Laterality Date     SECTION      2    CORRECTION OF HAMMER TOE Left 2020    Procedure: CORRECTION, HAMMER TOE;  Surgeon: Ha Goetz DPM;  Location: [facility] OR;  Service: Podiatry;  Laterality: Left;  mini c-arm, Arthrex toe implant, toes 3,4Mallory confirmed 2020 0745 KB    DILATION AND CURETTAGE OF UTERUS      FOOT HARDWARE REMOVAL Left 2022    Procedure: REMOVAL, HARDWARE, FOOT;  Surgeon: Ha Goetz DPM;  Location: [facility] OR;  Service: Podiatry;  Laterality: Left;    HYSTEROSCOPY WITH DILATION AND CURETTAGE OF UTERUS N/A 2023    Procedure: HYSTEROSCOPY, WITH DILATION AND CURETTAGE OF UTERUS;  Surgeon: Sherine Springer MD;  Location: [facility] OR;  Service: OB/GYN;  Laterality: N/A;    LAPAROSCOPIC SALPINGECTOMY Bilateral 2023    Procedure:  SALPINGECTOMY, LAPAROSCOPIC;  Surgeon: Sherine Springer MD;  Location: [facility] OR;  Service: OB/GYN;  Laterality: Bilateral;    OSTEOTOMY OF METATARSAL BONE Left 11/6/2020    Procedure: OSTEOTOMY, METATARSAL BONE;  Surgeon: Ha Goetz DPM;  Location: [facility] OR;  Service: Podiatry;  Laterality: Left;  Arthrex screws, dorsal exostectomy foot/deep peroneal nerve release included (Cuca notified 10/26, EF)    THERMAL ABLATION OF ENDOMETRIUM N/A 11/22/2023    Procedure: ABLATION, ENDOMETRIUM, THERMAL (NOVASURE);  Surgeon: Sherine Springer MD;  Location: [facility] OR;  Service: OB/GYN;  Laterality: N/A;        Current Outpatient Medications   Medication Instructions    butalbital-acetaminophen-caffeine -40 mg (FIORICET, ESGIC) -40 mg per tablet 1 tablet, Every 4 hours PRN    carvediloL (COREG) 12.5 mg, Oral, 2 times daily with meals    esomeprazole (NEXIUM) 20 mg    FLUoxetine 60 mg, Oral, Daily    Lactobacillus acidophilus 10 billion cell Cap 1 tablet, Daily    losartan (COZAAR) 50 mg, Oral, Daily    MOUNJARO 2.5 mg, Subcutaneous, Every 7 days    mupirocin (BACTROBAN) 2 % ointment Topical (Top), 2 times daily    ondansetron (ZOFRAN) 4 mg, Oral, Every 6 hours PRN        Review of patient's allergies indicates:   Allergen Reactions    Phenergan [promethazine] Hives    Spironolactone Rash       Review of Systems   Constitutional: Negative for fever and malaise/fatigue.   Eyes:  Negative for blurred vision.   Cardiovascular:  Negative for chest pain.   Respiratory:  Negative for shortness of breath.    Skin:  Negative for poor wound healing.   Musculoskeletal:  Positive for joint pain and myalgias.   Genitourinary:  Negative for bladder incontinence.   Neurological:  Negative for dizziness, numbness and paresthesias.   Psychiatric/Behavioral:  Negative for altered mental status.        The patient's relevant past medical, surgical, and social history was reviewed in Epic.       Objective:     "  VITAL SIGNS: Ht 5' 9" (1.753 m)   Wt 95.8 kg (211 lb 3.2 oz)   BMI 31.19 kg/m²     General    Nursing note and vitals reviewed.  Constitutional: She is oriented to person, place, and time. She appears well-developed and well-nourished.   Neurological: She is alert and oriented to person, place, and time.     General Musculoskeletal Exam   Gait: antalgic     Left Ankle/Foot Exam     Inspection  Scars: present    Tenderness   The patient is tender to palpation of the great toe metatarsophalangeal joint and metatarsals.    Range of Motion   Ankle Joint  Dorsiflexion:  normal   Plantar flexion:  normal     Subtalar Joint   Inversion:  normal   Eversion:  normal     Tests   Anterior drawer: negative  Varus tilt: negative    Other   Sensation: normal      Right Hand/Wrist Exam     Range of Motion     Wrist   Extension:  normal   Flexion:  normal       Right Elbow Exam     Tenderness   The patient is tender to palpation of the lateral epicondyle.     Range of Motion   Extension:  normal   Flexion:  normal     Tests   Tennis Elbow: mild    Other   Sensation: normal          Muscle Strength   Right Upper Extremity   Wrist extension: 5/5   Wrist flexion: 5/5   : 5/5   Elbow Pronation:  5/5   Elbow Supination:  5/5   Elbow Extension: 5/5  Elbow Flexion: 5/5  Left Lower Extremity   Anterior tibial:  5/5   Posterior tibial:  5/5   Gastrocsoleus:  5/5   Peroneal muscle:  5/5   FDL: 4/5  EDL: 4/5    Vascular Exam     Right Pulses      Radial:                    2+      Left Pulses  Dorsalis Pedis:      2+  Posterior Tibial:      1+           Imaging  X-Ray Foot Complete Left  Narrative: EXAMINATION:  XR FOOT COMPLETE 3 VIEW LEFT    CLINICAL HISTORY:  .  Pain in left foot    TECHNIQUE:  AP, lateral and oblique views of the left foot were performed.    COMPARISON:  03/22/2024    FINDINGS:  Well define erosions at the 4th metatarsal head.  Postoperative changes of the distal 5th metatarsal with hardware removal.  Nondisplaced " fracture at the base of the proximal phalanx of the 4th toe, new from 03/22/2024.  The remainder of the visualized osseous structures and soft tissues appear within normal limits.  Impression: Recent fracture at the base of the proximal phalanx of the 4th toe, the alignment appears satisfactory.    Degenerative change at the 4th metatarsal head with well define small para-articular erosions, unchanged.    Postoperative changes of the distal 5th metatarsal bone.  It is unchanged.    This report was flagged in Epic as abnormal.    Electronically signed by: Fany Cardoza MD  Date:    09/30/2024  Time:    10:19    I have reviewed the above radiograph and agree with the findings stated by the radiologist.           Assessment:       Alyssa Robledo is a 44 y.o. female seen in the office today for   1. Lateral epicondylitis of right elbow    2. Left foot pain    3. Closed fracture of phalanx of left fourth toe, initial encounter     Right elbow lateral epicondylitis.  Significant improvement with occupational therapy.  Continue OT until symptoms resolve.  Left foot 4th toe fracture.  Proper buddy-taping demonstrated today.  Comfortable shoe wear as tolerated.  Weightbearing as tolerated.  Did discuss a follow-up in 6 week but she declined.  She will follow-up as needed for both conditions.      Plan:       Alyssa was seen today for pain and follow-up.    Diagnoses and all orders for this visit:    Lateral epicondylitis of right elbow    Left foot pain  -     X-Ray Foot Complete Left; Future    Closed fracture of phalanx of left fourth toe, initial encounter      Continue OT for elbow  Buddy taping demonstrated today.  Shoewear and WBAT  Follow up as needed for both conditions.       Diagnoses and plan discussed with the patient, as well as the expected course and duration of his symptoms.  All questions and concerns were addressed prior to the end of the visit.   Instructed patient to call office if they have any  future questions/concerns or to schedule apt. Patient will return to see me if symptoms worsen or fail to improve    Note dictated with voice recognition software, please excuse any grammatical errors.        Carolyn Can PA-C      Department of Orthopedic Surgery  [facility]  Office: [phone]  09/30/2024

## 2024-10-29 ENCOUNTER — OFFICE VISIT (OUTPATIENT)
Facility: CLINIC | Age: 44
End: 2024-10-29
Payer: COMMERCIAL

## 2024-10-29 VITALS
WEIGHT: 207.44 LBS | SYSTOLIC BLOOD PRESSURE: 130 MMHG | HEIGHT: 69 IN | BODY MASS INDEX: 30.72 KG/M2 | DIASTOLIC BLOOD PRESSURE: 82 MMHG | HEART RATE: 84 BPM

## 2024-10-29 DIAGNOSIS — Z01.419 ENCNTR FOR GYN EXAM (GENERAL) (ROUTINE) W/O ABN FINDINGS: Primary | ICD-10-CM

## 2024-10-29 PROCEDURE — 1159F MED LIST DOCD IN RCRD: CPT | Mod: CPTII,S$GLB,, | Performed by: STUDENT IN AN ORGANIZED HEALTH CARE EDUCATION/TRAINING PROGRAM

## 2024-10-29 PROCEDURE — 99999 PR PBB SHADOW E&M-EST. PATIENT-LVL III: CPT | Mod: PBBFAC,,, | Performed by: STUDENT IN AN ORGANIZED HEALTH CARE EDUCATION/TRAINING PROGRAM

## 2024-10-29 PROCEDURE — 3079F DIAST BP 80-89 MM HG: CPT | Mod: CPTII,S$GLB,, | Performed by: STUDENT IN AN ORGANIZED HEALTH CARE EDUCATION/TRAINING PROGRAM

## 2024-10-29 PROCEDURE — 99396 PREV VISIT EST AGE 40-64: CPT | Mod: S$GLB,,, | Performed by: STUDENT IN AN ORGANIZED HEALTH CARE EDUCATION/TRAINING PROGRAM

## 2024-10-29 PROCEDURE — 3075F SYST BP GE 130 - 139MM HG: CPT | Mod: CPTII,S$GLB,, | Performed by: STUDENT IN AN ORGANIZED HEALTH CARE EDUCATION/TRAINING PROGRAM

## 2024-10-29 PROCEDURE — 3008F BODY MASS INDEX DOCD: CPT | Mod: CPTII,S$GLB,, | Performed by: STUDENT IN AN ORGANIZED HEALTH CARE EDUCATION/TRAINING PROGRAM

## 2024-10-29 PROCEDURE — 4010F ACE/ARB THERAPY RXD/TAKEN: CPT | Mod: CPTII,S$GLB,, | Performed by: STUDENT IN AN ORGANIZED HEALTH CARE EDUCATION/TRAINING PROGRAM

## 2024-10-31 ENCOUNTER — PATIENT MESSAGE (OUTPATIENT)
Dept: ADMINISTRATIVE | Facility: HOSPITAL | Age: 44
End: 2024-10-31
Payer: COMMERCIAL

## 2024-12-02 ENCOUNTER — PATIENT MESSAGE (OUTPATIENT)
Dept: FAMILY MEDICINE | Facility: CLINIC | Age: 44
End: 2024-12-02
Payer: COMMERCIAL

## 2024-12-02 DIAGNOSIS — Z00.00 ANNUAL PHYSICAL EXAM: Primary | ICD-10-CM

## 2024-12-02 NOTE — TELEPHONE ENCOUNTER
Pt states her BP reading from today was 130/75 with HR 83 bpm. Pt wants to know if there's any labs that needs to be completed before her appt on 12/18. There aren't any labs requested in the system. Pt last labs were 4/02/24 for Lipid panel and CMP, and 12/05/23 for Hemoglobin A1C, Lipid,TSH, and CMP. Please advise message.

## 2024-12-18 ENCOUNTER — OFFICE VISIT (OUTPATIENT)
Dept: FAMILY MEDICINE | Facility: CLINIC | Age: 44
End: 2024-12-18
Payer: COMMERCIAL

## 2024-12-18 VITALS
RESPIRATION RATE: 16 BRPM | BODY MASS INDEX: 30.16 KG/M2 | DIASTOLIC BLOOD PRESSURE: 86 MMHG | TEMPERATURE: 98 F | HEIGHT: 69 IN | SYSTOLIC BLOOD PRESSURE: 120 MMHG | HEART RATE: 73 BPM | WEIGHT: 203.63 LBS | OXYGEN SATURATION: 98 %

## 2024-12-18 DIAGNOSIS — I10 ESSENTIAL HYPERTENSION: ICD-10-CM

## 2024-12-18 DIAGNOSIS — E66.09 CLASS 1 OBESITY DUE TO EXCESS CALORIES WITH SERIOUS COMORBIDITY AND BODY MASS INDEX (BMI) OF 30.0 TO 30.9 IN ADULT: ICD-10-CM

## 2024-12-18 DIAGNOSIS — E66.811 CLASS 1 OBESITY DUE TO EXCESS CALORIES WITH SERIOUS COMORBIDITY AND BODY MASS INDEX (BMI) OF 30.0 TO 30.9 IN ADULT: ICD-10-CM

## 2024-12-18 DIAGNOSIS — M79.89 RIGHT AXILLARY SWELLING: ICD-10-CM

## 2024-12-18 DIAGNOSIS — Z00.00 ANNUAL PHYSICAL EXAM: ICD-10-CM

## 2024-12-18 DIAGNOSIS — F41.1 GAD (GENERALIZED ANXIETY DISORDER): ICD-10-CM

## 2024-12-18 DIAGNOSIS — F33.42 RECURRENT MAJOR DEPRESSIVE DISORDER, IN FULL REMISSION: ICD-10-CM

## 2024-12-18 PROCEDURE — 3044F HG A1C LEVEL LT 7.0%: CPT | Mod: CPTII,S$GLB,, | Performed by: STUDENT IN AN ORGANIZED HEALTH CARE EDUCATION/TRAINING PROGRAM

## 2024-12-18 PROCEDURE — 1160F RVW MEDS BY RX/DR IN RCRD: CPT | Mod: CPTII,S$GLB,, | Performed by: STUDENT IN AN ORGANIZED HEALTH CARE EDUCATION/TRAINING PROGRAM

## 2024-12-18 PROCEDURE — 4010F ACE/ARB THERAPY RXD/TAKEN: CPT | Mod: CPTII,S$GLB,, | Performed by: STUDENT IN AN ORGANIZED HEALTH CARE EDUCATION/TRAINING PROGRAM

## 2024-12-18 PROCEDURE — 3079F DIAST BP 80-89 MM HG: CPT | Mod: CPTII,S$GLB,, | Performed by: STUDENT IN AN ORGANIZED HEALTH CARE EDUCATION/TRAINING PROGRAM

## 2024-12-18 PROCEDURE — 99999 PR PBB SHADOW E&M-EST. PATIENT-LVL III: CPT | Mod: PBBFAC,,, | Performed by: STUDENT IN AN ORGANIZED HEALTH CARE EDUCATION/TRAINING PROGRAM

## 2024-12-18 PROCEDURE — 3008F BODY MASS INDEX DOCD: CPT | Mod: CPTII,S$GLB,, | Performed by: STUDENT IN AN ORGANIZED HEALTH CARE EDUCATION/TRAINING PROGRAM

## 2024-12-18 PROCEDURE — 3074F SYST BP LT 130 MM HG: CPT | Mod: CPTII,S$GLB,, | Performed by: STUDENT IN AN ORGANIZED HEALTH CARE EDUCATION/TRAINING PROGRAM

## 2024-12-18 PROCEDURE — 99396 PREV VISIT EST AGE 40-64: CPT | Mod: 25,S$GLB,, | Performed by: STUDENT IN AN ORGANIZED HEALTH CARE EDUCATION/TRAINING PROGRAM

## 2024-12-18 PROCEDURE — 1159F MED LIST DOCD IN RCRD: CPT | Mod: CPTII,S$GLB,, | Performed by: STUDENT IN AN ORGANIZED HEALTH CARE EDUCATION/TRAINING PROGRAM

## 2024-12-18 RX ORDER — LOSARTAN POTASSIUM 50 MG/1
50 TABLET ORAL DAILY
Qty: 90 TABLET | Refills: 3 | Status: SHIPPED | OUTPATIENT
Start: 2024-12-18 | End: 2025-12-18

## 2024-12-18 RX ORDER — FLUOXETINE HYDROCHLORIDE 20 MG/1
60 CAPSULE ORAL DAILY
Qty: 270 CAPSULE | Refills: 3 | Status: SHIPPED | OUTPATIENT
Start: 2024-12-18 | End: 2025-12-18

## 2024-12-18 RX ORDER — CARVEDILOL 12.5 MG/1
12.5 TABLET ORAL 2 TIMES DAILY WITH MEALS
Qty: 180 TABLET | Refills: 3 | Status: SHIPPED | OUTPATIENT
Start: 2024-12-18 | End: 2025-12-18

## 2024-12-18 NOTE — PROGRESS NOTES
[facility] Note    Chief Complaint      Chief Complaint   Patient presents with    Annual physical      History of Present Illness     Alyssa Robledo is a 44 y.o. female with sHTN, FLACO/MDD and HLD who presents for annual physicals . She endorses no new complaints today except intermittent painless bumps in her right axilla and feels overall great . She states home BP readings range in the 110s and described her mood to be great today.     Problem List Addressed This Visit:    As listed below         Health Maintenance   Topic Date Due    COVID-19 Vaccine (2024- season) 2025 (Originally 2024)    Mammogram  09/10/2025    Hemoglobin A1c (Prediabetes)  2025    Cervical Cancer Screening  2027    TETANUS VACCINE  2033    RSV Vaccine (Age 60+ and Pregnant patients) (1 - 1-dose 75+ series) 2056    Hepatitis C Screening  Completed    HIV Screening  Completed    Lipid Panel  Completed    Influenza Vaccine  Addressed    Pneumococcal Vaccines (Age 0-64)  Aged Out       Past Medical History:   Diagnosis Date    Anxiety     Closed fracture of head of metatarsal, left, with malunion, subsequent encounter 2020    GERD (gastroesophageal reflux disease)     Pain from implanted hardware 2022       Past Surgical History:   Procedure Laterality Date     SECTION      2    CORRECTION OF HAMMER TOE Left 2020    Procedure: CORRECTION, HAMMER TOE;  Surgeon: Ha Goetz DPM;  Location: [facility] OR;  Service: Podiatry;  Laterality: Left;  mini c-arm, Arthrex toe implant, toes 3,4Mallory confirmed 2020 0745 KB    DILATION AND CURETTAGE OF UTERUS      FOOT HARDWARE REMOVAL Left 2022    Procedure: REMOVAL, HARDWARE, FOOT;  Surgeon: Ha Goetz DPM;  Location: [facility] OR;  Service: Podiatry;  Laterality: Left;    HYSTEROSCOPY WITH DILATION AND CURETTAGE OF UTERUS N/A 2023    Procedure: HYSTEROSCOPY, WITH DILATION AND CURETTAGE OF UTERUS;   Surgeon: Sherine Springer MD;  Location: [facility] OR;  Service: OB/GYN;  Laterality: N/A;    LAPAROSCOPIC SALPINGECTOMY Bilateral 11/22/2023    Procedure: SALPINGECTOMY, LAPAROSCOPIC;  Surgeon: Sherine Springer MD;  Location: [facility] OR;  Service: OB/GYN;  Laterality: Bilateral;    OSTEOTOMY OF METATARSAL BONE Left 11/6/2020    Procedure: OSTEOTOMY, METATARSAL BONE;  Surgeon: Ha Goetz DPM;  Location: [facility] OR;  Service: Podiatry;  Laterality: Left;  Arthrex screws, dorsal exostectomy foot/deep peroneal nerve release included (Cuca notified 10/26, EF)    THERMAL ABLATION OF ENDOMETRIUM N/A 11/22/2023    Procedure: ABLATION, ENDOMETRIUM, THERMAL (NOVASURE);  Surgeon: Sherine Springer MD;  Location: [facility] OR;  Service: OB/GYN;  Laterality: N/A;       family history includes Breast cancer in her maternal aunt, maternal cousin, maternal cousin, maternal cousin, and maternal grandmother; No Known Problems in her father and mother.    Social History     Tobacco Use    Smoking status: Never     Passive exposure: Never    Smokeless tobacco: Never   Substance Use Topics    Alcohol use: Yes     Alcohol/week: 0.8 standard drinks of alcohol     Types: 1 Standard drinks or equivalent per week     Comment: Socially    Drug use: Never       Review of Systems   Constitutional:  Negative for fatigue and fever.   Respiratory:  Negative for cough and chest tightness.    Gastrointestinal:  Negative for abdominal pain, diarrhea and vomiting.   Endocrine: Negative for polydipsia and polyphagia.   Genitourinary:  Negative for difficulty urinating, dysuria and frequency.   Musculoskeletal:  Negative for arthralgias, back pain, gait problem and joint swelling.   Skin:  Negative for rash and wound.   Neurological:  Negative for seizures, weakness, numbness and headaches.   Psychiatric/Behavioral:  Negative for sleep disturbance.        Outpatient Encounter Medications as of 12/18/2024   Medication Sig  "Dispense Refill    butalbital-acetaminophen-caffeine -40 mg (FIORICET, ESGIC) -40 mg per tablet Take 1 tablet by mouth every 4 (four) hours as needed for Pain.      esomeprazole (NEXIUM) 20 MG capsule Take 20 mg by mouth.      Lactobacillus acidophilus 10 billion cell Cap Take 1 tablet by mouth once daily.      ondansetron (ZOFRAN) 4 MG tablet Take 1 tablet (4 mg total) by mouth every 6 (six) hours as needed for Nausea. 20 tablet 0    tirzepatide (MOUNJARO) 2.5 mg/0.5 mL PnIj Inject 2.5 mg into the skin every 7 days. 4 Pen 3    [DISCONTINUED] carvediloL (COREG) 12.5 MG tablet Take 1 tablet (12.5 mg total) by mouth 2 (two) times daily with meals. 60 tablet 11    [DISCONTINUED] FLUoxetine 20 MG capsule Take 3 capsules (60 mg total) by mouth once daily. 270 capsule 3    [DISCONTINUED] losartan (COZAAR) 50 MG tablet Take 1 tablet (50 mg total) by mouth once daily. 90 tablet 3    carvediloL (COREG) 12.5 MG tablet Take 1 tablet (12.5 mg total) by mouth 2 (two) times daily with meals. 180 tablet 3    FLUoxetine 20 MG capsule Take 3 capsules (60 mg total) by mouth once daily. 270 capsule 3    losartan (COZAAR) 50 MG tablet Take 1 tablet (50 mg total) by mouth once daily. 90 tablet 3     No facility-administered encounter medications on file as of 12/18/2024.        Review of patient's allergies indicates:   Allergen Reactions    Phenergan [promethazine] Hives    Spironolactone Rash       Physical Exam      Vital Signs  Temp: 97.7 °F (36.5 °C)  Temp Source: Temporal  Pulse: 73  Resp: 16  SpO2: 98 %  BP: 120/86  BP Location: Right arm  Patient Position: Sitting  Pain Score: 0-No pain  Height and Weight  Height: 5' 9" (175.3 cm)  Weight: 92.4 kg (203 lb 9.5 oz)  BSA (Calculated - sq m): 2.12 sq meters  BMI (Calculated): 30.1  Weight in (lb) to have BMI = 25: 168.9]    Physical Exam  Vitals reviewed.   Constitutional:       Appearance: Normal appearance. She is obese.   HENT:      Head: Normocephalic and atraumatic. " "     Right Ear: Tympanic membrane normal.      Left Ear: Tympanic membrane normal.      Mouth/Throat:      Mouth: Mucous membranes are moist.      Pharynx: Oropharynx is clear.   Eyes:      Extraocular Movements: Extraocular movements intact.      Conjunctiva/sclera: Conjunctivae normal.      Pupils: Pupils are equal, round, and reactive to light.   Cardiovascular:      Rate and Rhythm: Normal rate and regular rhythm.      Pulses: Normal pulses.      Heart sounds: Normal heart sounds.   Pulmonary:      Effort: Pulmonary effort is normal.      Breath sounds: Normal breath sounds.   Abdominal:      General: Bowel sounds are normal.      Palpations: Abdomen is soft.   Musculoskeletal:      Cervical back: Normal range of motion.      Right lower leg: No edema.      Left lower leg: No edema.   Lymphadenopathy:      Cervical: No cervical adenopathy.   Skin:     General: Skin is warm and dry.      Findings: No lesion.   Neurological:      General: No focal deficit present.      Mental Status: She is alert and oriented to person, place, and time.      Sensory: No sensory deficit.   Psychiatric:         Mood and Affect: Mood normal.         Behavior: Behavior normal.          Laboratory:  CBC:  Recent Labs   Lab Result Units 12/16/24  0723   WBC K/uL 7.28   RBC M/uL 4.28   Hemoglobin g/dL 13.0   Hematocrit % 38.6   Platelets K/uL 318   MCV fL 90   MCH pg 30.4   MCHC g/dL 33.7     CMP:  Recent Labs   Lab Result Units 12/16/24  0723   Glucose mg/dL 90   Calcium mg/dL 9.2   Albumin g/dL 3.6   Total Protein g/dL 7.3   Sodium mmol/L 135*   Potassium mmol/L 4.2   CO2 mmol/L 24   Chloride mmol/L 103   BUN mg/dL 18   Alkaline Phosphatase U/L 83   ALT U/L 32   AST U/L 20   Total Bilirubin mg/dL 0.3     URINALYSIS:  No results for input(s): "COLORU", "CLARITYU", "SPECGRAV", "PHUR", "PROTEINUA", "GLUCOSEU", "BILIRUBINCON", "BLOODU", "WBCU", "RBCU", "BACTERIA", "MUCUS", "NITRITE", "LEUKOCYTESUR", "UROBILINOGEN", "HYALINECASTS" in the " last 2160 hours.   LIPIDS:  Recent Labs   Lab Result Units 12/16/24  0723   TSH uIU/mL 0.864   HDL mg/dL 65   Cholesterol mg/dL 171   Triglycerides mg/dL 85   LDL Cholesterol mg/dL 89.0   HDL/Cholesterol Ratio % 38.0   Non-HDL Cholesterol mg/dL 106   Total Cholesterol/HDL Ratio  2.6     TSH:  Recent Labs   Lab Result Units 12/16/24  0723   TSH uIU/mL 0.864     A1C:  Recent Labs   Lab Result Units 12/16/24  0723   Hemoglobin A1C % 5.3       Radiology:      Assessment/Plan     Alyssa Robledo is a 44 y.o.female with:    1. Annual physical exam  -preventive counseling provided  -labs done reviewed and findings discussed with patient   -UTD ob breast and cervical cancer screening   -declined due vaccines : flu ,covid    2. Essential hypertension  Overview:  - BP now at goal  - c/w losartan/coreg  - encouraged to patient to notify me of any questions or concerns    3. FLACO/MDD  -well controlled on fluoxetine    4. Obesity  -class I, improving  -c/w current regimen  -c/w life style modifications       -Continue current medications and maintain follow up with specialists.      Patient verbalizes understanding and agrees with current treatment plan.      Wanda Wayne MD  Internal Medicine   [facility]

## 2025-01-28 ENCOUNTER — OFFICE VISIT (OUTPATIENT)
Dept: FAMILY MEDICINE | Facility: CLINIC | Age: 45
End: 2025-01-28
Payer: COMMERCIAL

## 2025-01-28 VITALS
OXYGEN SATURATION: 98 % | SYSTOLIC BLOOD PRESSURE: 122 MMHG | HEIGHT: 69 IN | DIASTOLIC BLOOD PRESSURE: 82 MMHG | HEART RATE: 79 BPM | TEMPERATURE: 98 F | RESPIRATION RATE: 16 BRPM | BODY MASS INDEX: 30.74 KG/M2 | WEIGHT: 207.56 LBS

## 2025-01-28 DIAGNOSIS — M79.89 LEFT AXILLARY SWELLING: Primary | ICD-10-CM

## 2025-01-28 DIAGNOSIS — I10 ESSENTIAL HYPERTENSION: ICD-10-CM

## 2025-01-28 DIAGNOSIS — F41.1 GAD (GENERALIZED ANXIETY DISORDER): ICD-10-CM

## 2025-01-28 PROCEDURE — 99999 PR PBB SHADOW E&M-EST. PATIENT-LVL IV: CPT | Mod: PBBFAC,,, | Performed by: STUDENT IN AN ORGANIZED HEALTH CARE EDUCATION/TRAINING PROGRAM

## 2025-01-28 PROCEDURE — 1160F RVW MEDS BY RX/DR IN RCRD: CPT | Mod: CPTII,S$GLB,, | Performed by: STUDENT IN AN ORGANIZED HEALTH CARE EDUCATION/TRAINING PROGRAM

## 2025-01-28 PROCEDURE — 3079F DIAST BP 80-89 MM HG: CPT | Mod: CPTII,S$GLB,, | Performed by: STUDENT IN AN ORGANIZED HEALTH CARE EDUCATION/TRAINING PROGRAM

## 2025-01-28 PROCEDURE — 3074F SYST BP LT 130 MM HG: CPT | Mod: CPTII,S$GLB,, | Performed by: STUDENT IN AN ORGANIZED HEALTH CARE EDUCATION/TRAINING PROGRAM

## 2025-01-28 PROCEDURE — 99214 OFFICE O/P EST MOD 30 MIN: CPT | Mod: S$GLB,,, | Performed by: STUDENT IN AN ORGANIZED HEALTH CARE EDUCATION/TRAINING PROGRAM

## 2025-01-28 PROCEDURE — 3008F BODY MASS INDEX DOCD: CPT | Mod: CPTII,S$GLB,, | Performed by: STUDENT IN AN ORGANIZED HEALTH CARE EDUCATION/TRAINING PROGRAM

## 2025-01-28 PROCEDURE — 1159F MED LIST DOCD IN RCRD: CPT | Mod: CPTII,S$GLB,, | Performed by: STUDENT IN AN ORGANIZED HEALTH CARE EDUCATION/TRAINING PROGRAM

## 2025-01-28 PROCEDURE — 4010F ACE/ARB THERAPY RXD/TAKEN: CPT | Mod: CPTII,S$GLB,, | Performed by: STUDENT IN AN ORGANIZED HEALTH CARE EDUCATION/TRAINING PROGRAM

## 2025-01-28 NOTE — PROGRESS NOTES
[facility] Note    Chief Complaint      Chief Complaint   Patient presents with    bumps under armpits, reoccurring.      History of Present Illness      Alyssa Robledo is a 44 y.o. female with sHTN, FLACO/MDD and HLD who presents with concerns about recurring axillary lumps, with a particularly large one that has been present for about a week. Alyssa reports developing recurring axillary lumps, with a particularly large one present for about a week. She changed to an aluminum-free deodorant as previously advised. The large lump was initially painful but is no longer causing discomfort. Multiple lumps are present, with the left side having a larger, more bothersome lump and smaller lumps on the right side.    Alyssa has attempted various interventions, including applying heat with a heating pad and attempting to express the lumps, but without success. She mentions regular shaving but is unsure if this is related to the lump formation. A previous ultrasound of the axillary area when the lumps were smaller showed unremarkable results.    Alyssa expresses concern about the appearance of the lumps, particularly in anticipation of summer activities and a beach concert. She reports feeling self-conscious about raising her arms due to the lumps.    Alyssa is up-to-date on her mammograms and other routine screenings. She denies current pain in the large lump, discharge from the lumps, or any arm swelling or redness.    MEDICATIONS:  - Ali (Orlistat), OTC weight loss medication    MEDICAL HISTORY:  - Shingles vaccine received recently    IMAGING:  - Left axillary ultrasound: Previous, results showed superficial findings, bumps were small and difficult to capture  - Mammogram: Previous, completed    SOCIAL HISTORY:  - Occupation: Nursing      ROS:  General: -fever, -chills, -fatigue, -weight gain, -weight loss  Eyes: -vision changes, -redness, -discharge  ENT: -ear pain, -nasal congestion, -sore  throat  Cardiovascular: -chest pain, -palpitations, -lower extremity edema  Respiratory: -cough, -shortness of breath  Gastrointestinal: -abdominal pain, -nausea, -vomiting, -diarrhea, -constipation, -blood in stool  Genitourinary: -dysuria, -hematuria, -frequency  Musculoskeletal: -joint pain, -muscle pain, -limb swelling  Skin: -rash, -lesion  Neurological: -headache, -dizziness, -numbness, -tingling  Psychiatric: -anxiety, -depression, -sleep difficulty          Alyssa Robledo is a 44 y.o. female who presents with:    Problem List Addressed This Visit:  1. Left axillary swelling  -     US Soft Tissue Axilla, Left; Future; Expected date: 01/28/2025    2. Essential hypertension  Overview:  - well controlled  - but recurrent hypokalemia with chlorthalidone and KCl supplement  - recommends stop chlorthalidone and KCl  - recommend start spironolactone 50 mg daily  - monitor BP regularly  - counseling regarding new medication including expected results, potential side effects, and appropriate use.  - questions elicited and answered  - encouraged to patient to notify me of any questions or concerns      3. FLACO (generalized anxiety disorder)           Outpatient Encounter Medications as of 1/28/2025   Medication Sig Dispense Refill    butalbital-acetaminophen-caffeine -40 mg (FIORICET, ESGIC) -40 mg per tablet Take 1 tablet by mouth every 4 (four) hours as needed for Pain.      carvediloL (COREG) 12.5 MG tablet Take 1 tablet (12.5 mg total) by mouth 2 (two) times daily with meals. 180 tablet 3    esomeprazole (NEXIUM) 20 MG capsule Take 20 mg by mouth.      FLUoxetine 20 MG capsule Take 3 capsules (60 mg total) by mouth once daily. 270 capsule 3    Lactobacillus acidophilus 10 billion cell Cap Take 1 tablet by mouth once daily.      losartan (COZAAR) 50 MG tablet Take 1 tablet (50 mg total) by mouth once daily. 90 tablet 3    ondansetron (ZOFRAN) 4 MG tablet Take 1 tablet (4 mg total) by mouth every 6 (six)  "hours as needed for Nausea. 20 tablet 0    tirzepatide (MOUNJARO) 2.5 mg/0.5 mL PnIj Inject 2.5 mg into the skin every 7 days. 4 Pen 3     No facility-administered encounter medications on file as of 1/28/2025.        Review of patient's allergies indicates:   Allergen Reactions    Phenergan [promethazine] Hives    Spironolactone Rash       Physical Exam      Vital Signs  Temp: 98.1 °F (36.7 °C)  Temp Source: Temporal  Pulse: 79  Resp: 16  SpO2: 98 %  BP: 122/82  BP Location: Right arm  Patient Position: Sitting  Pain Score: 0-No pain  Height and Weight  Height: 5' 9" (175.3 cm)  Weight: 94.2 kg (207 lb 9 oz)  BSA (Calculated - sq m): 2.14 sq meters  BMI (Calculated): 30.6  Weight in (lb) to have BMI = 25: 168.9]    Physical Exam    General: No acute distress. Well-developed. Well-nourished.  Musculoskeletal: No  obvious deformity.  Extremities: No lower extremity edema.  Neurological: Alert & oriented x3. No slurred speech. Normal gait.  Psychiatric: Normal mood. Normal affect. Good insight. Good judgment.  Skin: Warm. Dry. Firm 1X1cm nodule palpated in the left axilla an <0.2cm nodule in the left and right axillae          Laboratory:  CBC:  Recent Labs   Lab Result Units 12/16/24  0723   WBC K/uL 7.28   RBC M/uL 4.28   Hemoglobin g/dL 13.0   Hematocrit % 38.6   Platelets K/uL 318   MCV fL 90   MCH pg 30.4   MCHC g/dL 33.7     CMP:  Recent Labs   Lab Result Units 12/16/24  0723   Glucose mg/dL 90   Calcium mg/dL 9.2   Albumin g/dL 3.6   Total Protein g/dL 7.3   Sodium mmol/L 135*   Potassium mmol/L 4.2   CO2 mmol/L 24   Chloride mmol/L 103   BUN mg/dL 18   Alkaline Phosphatase U/L 83   ALT U/L 32   AST U/L 20   Total Bilirubin mg/dL 0.3     URINALYSIS:  No results for input(s): "COLORU", "CLARITYU", "SPECGRAV", "PHUR", "PROTEINUA", "GLUCOSEU", "BILIRUBINCON", "BLOODU", "WBCU", "RBCU", "BACTERIA", "MUCUS", "NITRITE", "LEUKOCYTESUR", "UROBILINOGEN", "HYALINECASTS" in the last 2160 hours.   LIPIDS:  Recent Labs   Lab " Result Units 12/16/24  0723   TSH uIU/mL 0.864   HDL mg/dL 65   Cholesterol mg/dL 171   Triglycerides mg/dL 85   LDL Cholesterol mg/dL 89.0   HDL/Cholesterol Ratio % 38.0   Non-HDL Cholesterol mg/dL 106   Total Cholesterol/HDL Ratio  2.6     TSH:  Recent Labs   Lab Result Units 12/16/24  0723   TSH uIU/mL 0.864     A1C:  Recent Labs   Lab Result Units 12/16/24  0723   Hemoglobin A1C % 5.3       Radiology:      Assessment/Plan     Alyssa Robledo is a 44 y.o.female with:    1. Left axillary swelling  - US Soft Tissue Axilla, Left; Future    2. Essential hypertension    3. FLACO (generalized anxiety disorder)    Assessment & Plan      RECURRENT AXILLARY SWELLING  - Evaluated axillary lump, considering possible diagnoses including obstructive cyst, lymph node  - Examined the cyst and noted it's not drainable, does not appear infected, and feels like an ingrown hair.  - Identified multiple cysts in the axillary region.  - Ordered targeted ultrasound of left axillary mass to classify and determine appropriate treatment approach.  - Explained potential causes of axillary masses, including cysts, lymph nodes, and hidradenitis suppurativa.  - Referred the patient to dermatologist for evaluation and potential treatment of axillary mass.  - Noted that the patient reports a large cyst present for about a week, with others coming and going.  - The cyst was initially painful but is no longer causing discomfort.    BMI 30  -c/w life style modifications    FLACO  -well controlled on current regimen    HYPERTENSION:  - Assessed blood pressure control, which is well-managed based on home and office readings.  - Will maintain all current medications, including those for blood pressure management.  - Noted that the patient reports taking blood pressure measurements at home with good results.  - Measured blood pressure in the office and reported it as optimal.    -Continue current medications and maintain follow up with specialists.       Patient verbalizes understanding and agrees with current treatment plan.    This note was generated with the assistance of ambient listening technology. I attest to having reviewed and edited the generated note for accuracy, though some syntax or spelling errors may persist. Please contact the author of this note for any clarification.       32 minutes of total time spent on the encounter, which includes face to face time and non-face to face time preparing to see the patient (eg, review of tests), Obtaining and/or reviewing separately obtained history, Documenting clinical information in the electronic or other health record, Independently interpreting results (not separately reported) and communicating results to the patient or Care coordination (not separately reported).      Wanda Wayne MD  Internal Medicine   [facility]

## 2025-08-12 DIAGNOSIS — E66.811 CLASS 1 OBESITY DUE TO EXCESS CALORIES WITH SERIOUS COMORBIDITY AND BODY MASS INDEX (BMI) OF 32.0 TO 32.9 IN ADULT: ICD-10-CM

## 2025-08-12 DIAGNOSIS — E66.09 CLASS 1 OBESITY DUE TO EXCESS CALORIES WITH SERIOUS COMORBIDITY AND BODY MASS INDEX (BMI) OF 32.0 TO 32.9 IN ADULT: ICD-10-CM

## 2025-08-12 RX ORDER — TIRZEPATIDE 2.5 MG/.5ML
INJECTION, SOLUTION SUBCUTANEOUS
Qty: 12 PEN | Refills: 0 | Status: SHIPPED | OUTPATIENT
Start: 2025-08-12

## (undated) DEVICE — CLOSURE SKIN STERI STRIP 1/2X4

## (undated) DEVICE — SUT PROLENE 4-0 MONO 18IN

## (undated) DEVICE — BANDAGE MATRIX HK LOOP 4IN 5YD

## (undated) DEVICE — NDL INSUF ULTRA VERESS 120MM

## (undated) DEVICE — SPONGE GAUZE 16PLY 4X4

## (undated) DEVICE — NDL HYPO REG 25G X 1 1/2

## (undated) DEVICE — GLOVE BIOGEL ECLIPSE SZ 8

## (undated) DEVICE — SEE MEDLINE ITEM 146308

## (undated) DEVICE — SUT VICRYL 3-0 27 SH

## (undated) DEVICE — DRAPE LEGGINGS CUFF 33X51IN

## (undated) DEVICE — BLADE SCALP OPHTL BEVEL STR

## (undated) DEVICE — GUIDEWIRE TROCAR TIP .045
Type: IMPLANTABLE DEVICE | Site: FOOT | Status: NON-FUNCTIONAL
Removed: 2020-11-06

## (undated) DEVICE — PACK BASIC

## (undated) DEVICE — SCRUB 10% POVIDONE IODINE 4OZ

## (undated) DEVICE — DRESSING XEROFORM FOIL PK 1X8

## (undated) DEVICE — BANDAGE DERMACEA STRETCH 4X1IN

## (undated) DEVICE — DRESSING GAUZE XEROFORM 5X9

## (undated) DEVICE — DEVICE ABLATION NOVASURE DISP

## (undated) DEVICE — Device

## (undated) DEVICE — SEE L#120831

## (undated) DEVICE — SEE MEDLINE ITEM 156953

## (undated) DEVICE — SUT MONOCRYL 5-0 P-3 UND 18

## (undated) DEVICE — SHOE POST-OP VEL CLOS FM W/LG

## (undated) DEVICE — SEE MEDLINE ITEM 152522

## (undated) DEVICE — DRAPE STERI-DRAPE 1000 17X11IN

## (undated) DEVICE — SYR 10CC LUER LOCK

## (undated) DEVICE — GLOVE BIOGEL PI MICRO INDIC 8

## (undated) DEVICE — NDL 18GA X1 1/2 REG BEVEL

## (undated) DEVICE — SEE L#133928

## (undated) DEVICE — TUBING SUC UNIV W/CONN 12FT

## (undated) DEVICE — CANNULA LAP SEAL Z THRD 5X100

## (undated) DEVICE — PAD PREP 50/CA

## (undated) DEVICE — DRESSING TELFA N ADH 3X8

## (undated) DEVICE — DRAPE UINDERBUT GRAD PCH

## (undated) DEVICE — BIT DRILL SOLID 1.7 MM

## (undated) DEVICE — DRAPE MINI C-ARM 54 X 64

## (undated) DEVICE — PAD CAST SPECIALIST STRL 4

## (undated) DEVICE — SEE MEDLINE ITEM 152523

## (undated) DEVICE — ADHESIVE DERMABOND ADVANCED

## (undated) DEVICE — SEE MEDLINE ITEM 154981

## (undated) DEVICE — SCRUB HIBICLENS 4% CHG 4OZ

## (undated) DEVICE — PAD SANITARY OB STERILE

## (undated) DEVICE — COVER OVERHEAD SURG LT BLUE

## (undated) DEVICE — SUT MCRYL PLUS 4-0 PS2 27IN

## (undated) DEVICE — APPLICATOR CHLORAPREP ORN 26ML

## (undated) DEVICE — SEE MEDLINE ITEM 152529

## (undated) DEVICE — TOWELS STERILE 18 X 25.5

## (undated) DEVICE — ELECTRODE REM PLYHSV RETURN 9

## (undated) DEVICE — TRAY DRY SKIN SCRUB PREP

## (undated) DEVICE — SUT VICRYL PLUS 4-0 P3 18IN

## (undated) DEVICE — GOWN POLY REINF BRTH SLV XL

## (undated) DEVICE — DRAPE THREE-QUARTER 53X77IN

## (undated) DEVICE — BLADE SURG #15 CARBON STEEL

## (undated) DEVICE — STOCKINET TUBULAR 1 PLY 6X60IN

## (undated) DEVICE — SEE MEDLINE ITEM 146270

## (undated) DEVICE — GAUZE SPONGE 4X4 12PLY

## (undated) DEVICE — GLOVE BIOGEL PI MICRO SZ 6

## (undated) DEVICE — GOWN POLY REINF BRTH SLV LG

## (undated) DEVICE — VOYANT MARYLAND FUSION DEVICE

## (undated) DEVICE — PADDING CAST 4IN DELTA ROLL

## (undated) DEVICE — SUT VICRYL 4-0 27 SH

## (undated) DEVICE — DRESSING XEROFORM 1X8IN

## (undated) DEVICE — GLOVE BIOGEL PIMICRO INDIC 6.5

## (undated) DEVICE — TROCAR ENDO Z THREAD KII 5X100

## (undated) DEVICE — BANDAGE ESMARK ELASTIC ST 4X9

## (undated) DEVICE — STAPLER SKIN ROTATING HEAD

## (undated) DEVICE — SEE MEDLINE ITEM 146345

## (undated) DEVICE — SPONGE LAP 18X18 PREWASHED

## (undated) DEVICE — SUT VICRYL CTD 3-0 PS-1 18